# Patient Record
Sex: MALE | Race: WHITE | NOT HISPANIC OR LATINO | Employment: OTHER | URBAN - METROPOLITAN AREA
[De-identification: names, ages, dates, MRNs, and addresses within clinical notes are randomized per-mention and may not be internally consistent; named-entity substitution may affect disease eponyms.]

---

## 2018-09-11 ENCOUNTER — OFFICE VISIT (OUTPATIENT)
Dept: CARDIOLOGY | Facility: CLINIC | Age: 63
End: 2018-09-11

## 2018-09-11 VITALS
BODY MASS INDEX: 28.64 KG/M2 | HEART RATE: 63 BPM | SYSTOLIC BLOOD PRESSURE: 158 MMHG | DIASTOLIC BLOOD PRESSURE: 80 MMHG | WEIGHT: 178.2 LBS | HEIGHT: 66 IN

## 2018-09-11 DIAGNOSIS — I25.10 CORONARY ARTERY DISEASE INVOLVING NATIVE CORONARY ARTERY OF NATIVE HEART WITHOUT ANGINA PECTORIS: Primary | ICD-10-CM

## 2018-09-11 DIAGNOSIS — E11.59 TYPE 2 DIABETES MELLITUS WITH OTHER CIRCULATORY COMPLICATION, WITHOUT LONG-TERM CURRENT USE OF INSULIN (HCC): ICD-10-CM

## 2018-09-11 DIAGNOSIS — I10 ESSENTIAL HYPERTENSION: ICD-10-CM

## 2018-09-11 DIAGNOSIS — R09.89 BRUIT OF LEFT CAROTID ARTERY: ICD-10-CM

## 2018-09-11 DIAGNOSIS — E78.2 MIXED HYPERLIPIDEMIA: ICD-10-CM

## 2018-09-11 DIAGNOSIS — I34.0 NON-RHEUMATIC MITRAL REGURGITATION: ICD-10-CM

## 2018-09-11 PROCEDURE — 99204 OFFICE O/P NEW MOD 45 MIN: CPT | Performed by: INTERNAL MEDICINE

## 2018-09-11 PROCEDURE — 93000 ELECTROCARDIOGRAM COMPLETE: CPT | Performed by: INTERNAL MEDICINE

## 2018-09-11 RX ORDER — FENOFIBRATE 160 MG/1
160 TABLET ORAL DAILY
COMMUNITY
End: 2021-11-08

## 2018-09-11 RX ORDER — BISOPROLOL FUMARATE 10 MG/1
TABLET, FILM COATED ORAL
COMMUNITY
End: 2022-05-17 | Stop reason: HOSPADM

## 2018-09-11 RX ORDER — ALLOPURINOL 300 MG/1
TABLET ORAL
Status: ON HOLD | COMMUNITY
End: 2022-05-15

## 2018-09-11 RX ORDER — ASPIRIN 325 MG
325 TABLET ORAL DAILY
COMMUNITY
End: 2022-05-17 | Stop reason: HOSPADM

## 2018-09-11 RX ORDER — NITROGLYCERIN 0.4 MG/1
TABLET SUBLINGUAL
COMMUNITY
End: 2019-10-03 | Stop reason: SDUPTHER

## 2018-09-11 RX ORDER — MONTELUKAST SODIUM 10 MG/1
TABLET ORAL
COMMUNITY
End: 2019-10-03

## 2018-09-11 RX ORDER — GLIMEPIRIDE 4 MG/1
4 TABLET ORAL
COMMUNITY
End: 2022-05-17 | Stop reason: HOSPADM

## 2018-09-11 RX ORDER — AMLODIPINE BESYLATE 10 MG/1
TABLET ORAL
COMMUNITY

## 2018-09-11 RX ORDER — CITALOPRAM 40 MG/1
TABLET ORAL
COMMUNITY

## 2018-09-11 RX ORDER — CLOPIDOGREL BISULFATE 75 MG/1
TABLET ORAL
COMMUNITY
End: 2022-05-17 | Stop reason: HOSPADM

## 2018-09-11 RX ORDER — METFORMIN HYDROCHLORIDE 500 MG/1
TABLET, EXTENDED RELEASE ORAL
Status: ON HOLD | COMMUNITY
End: 2019-10-07 | Stop reason: SDUPTHER

## 2018-09-11 RX ORDER — LOSARTAN POTASSIUM 100 MG/1
TABLET ORAL
COMMUNITY
End: 2019-10-03

## 2018-09-11 RX ORDER — ISOSORBIDE MONONITRATE 30 MG/1
TABLET, EXTENDED RELEASE ORAL
COMMUNITY
End: 2019-10-03

## 2018-09-11 RX ORDER — OMEPRAZOLE 20 MG/1
20 CAPSULE, DELAYED RELEASE ORAL DAILY
COMMUNITY
End: 2019-10-03

## 2018-09-11 RX ORDER — FOLIC ACID 1 MG/1
TABLET ORAL
COMMUNITY

## 2018-09-11 RX ORDER — SIMVASTATIN 80 MG
TABLET ORAL
COMMUNITY
End: 2022-05-17 | Stop reason: HOSPADM

## 2018-09-11 NOTE — PROGRESS NOTES
Date of Office Visit: 18  Encounter Provider: Jose M Osullivan MD  Place of Service: [facility] CARDIOLOGY  Patient Name: Fernando Gauthier  :1955  Referral Provider:Jeff Keating MD      Chief Complaint   Patient presents with   • Coronary Artery Disease     History of Present Illness  Mr Gauthier is a 64 yo gentleman with a history of coronary disease severe disease of the proximal mid and the distal left anterior descending: Disease of the marginal.  He had angioplasty of multiple areas in the left anterior descending back in 2001.  Then recurrent chest discomfort in 2004.  Repeat cardiac catheterization showed all the stents in left anterior descending were patent but had 70% stenosis of the obtuse marginal and nondominant right coronary artery had angioplasty and stent placement of the marginal vessel.  Then in 2008 he had recurrent cardiac symptoms perfusion stress test was unremarkable he then had cardiac catheterization which showed severe stenosis of the proximal anterior descending widely patent mid distal and apical stents borderline disease the OM1 80 this is the mid circumflex right coronary artery was free disease had angioplasty stent placement of the circumflex and left anterior descending with drug-eluting stents.  Then in  he presented with progressive dyspnea repeat cardiac catheterization showed normal left ventricular systolic function.  Total occlusion of the right coronary artery not amenable to angioplasty other vessels are free disease was a nondominant vessel he was treated medically.  Follow-up echocardiogram in  showed normal left ventricular ejection fraction moderately dilated left atrium no significant valvular disease.  He also has history of diabetes mellitus, hypertension, and hyperlipidemia.  He now comes in to establish with cardiology.  It is been over a year since we've seen him.  He was in the hospital in   at believe with sepsis.  From a heart standpoint he says he's been doing well.  He denies chest pain or pressure.  Denies any shortness of breath, orthopnea, or PND.  Denies any palpitations, near-syncope or syncope.  Denies any abrupt loss of vision, paralysis, paresthesia, or dysarthria.  He denies any blood in his stool or black tarry stools.  He walks a mile in 15 minutes 2-3 days a week.  Overall he feels like he's doing well.  He does get some leg fatigue at that mile zain.          Past Medical History:   Diagnosis Date   • Coronary artery disease    • Diabetes mellitus (CMS/HCC)    • GERD (gastroesophageal reflux disease)    • Gout    • Hiatal hernia    • History of kidney cancer    • Hyperlipidemia    • Hypertension          Past Surgical History:   Procedure Laterality Date   • AVULSION TOENAIL PLATE     • CARDIAC CATHETERIZATION     • CHOLECYSTECTOMY     • CORONARY ANGIOPLASTY     • CORONARY STENT PLACEMENT     • EYE SURGERY     • NEPHRECTOMY Left          No current outpatient prescriptions on file prior to visit.     No current facility-administered medications on file prior to visit.          Social History     Social History   • Marital status:      Spouse name: N/A   • Number of children: 1   • Years of education: N/A     Occupational History   • retired      Social History Main Topics   • Smoking status: Light Tobacco Smoker     Packs/day: 0.25     Years: 40.00   • Smokeless tobacco: Not on file      Comment: caffeine use   • Alcohol use Yes      Comment: rare   • Drug use: Unknown   • Sexual activity: Not on file     Other Topics Concern   • Not on file     Social History Narrative   • No narrative on file       Family History   Problem Relation Age of Onset   • Heart disease Mother    • Diabetes Mother    • Stomach cancer Mother    • Heart disease Father    • Heart disease Sister    • Stroke Sister    • Diabetes Sister    • Heart disease Brother    • Stroke Brother    • Hypertension Brother  "   • Heart disease Sister    • Diabetes Sister    • Heart disease Sister    • Heart disease Sister    • Diabetes Sister    • Liver cancer Brother    • Heart attack Other          Review of Systems   Constitution: Negative for decreased appetite, diaphoresis, fever, weakness, malaise/fatigue, weight gain and weight loss.   HENT: Negative for congestion, hearing loss, nosebleeds and tinnitus.    Eyes: Negative for blurred vision, double vision, vision loss in left eye, vision loss in right eye and visual disturbance.   Cardiovascular:        As noted in HPI   Respiratory:        As noted HPI   Endocrine: Negative for cold intolerance and heat intolerance.   Hematologic/Lymphatic: Negative for bleeding problem. Does not bruise/bleed easily.   Skin: Negative for color change, flushing, itching and rash.   Musculoskeletal: Negative for arthritis, back pain, joint pain, joint swelling, muscle weakness and myalgias.   Gastrointestinal: Negative for bloating, abdominal pain, constipation, diarrhea, dysphagia, heartburn, hematemesis, hematochezia, melena, nausea and vomiting.   Genitourinary: Negative for bladder incontinence, dysuria, frequency, nocturia and urgency.   Neurological: Negative for dizziness, focal weakness, headaches, light-headedness, loss of balance, numbness, paresthesias and vertigo.   Psychiatric/Behavioral: Negative for depression, memory loss and substance abuse.       Procedures      ECG 12 Lead  Date/Time: 9/11/2018 10:25 AM  Performed by: MEETA CESPEDES  Authorized by: MEETA CESPEDES   Comparison: compared with previous ECG   Similar to previous ECG  Rhythm: sinus rhythm  Rate: normal  QRS axis: normal                  Objective:    /80 (BP Location: Left arm)   Pulse 63   Ht 167.6 cm (66\")   Wt 80.8 kg (178 lb 3.2 oz)   BMI 28.76 kg/m²        Physical Exam  Physical Exam   Constitutional: He is oriented to person, place, and time. He appears well-developed and well-nourished. No " distress.   HENT:   Head: Normocephalic.   Eyes: Pupils are equal, round, and reactive to light. Conjunctivae are normal. No scleral icterus.   Neck: Normal carotid pulses, no hepatojugular reflux and no JVD present. Carotid bruit is not present. No tracheal deviation, no edema and no erythema present. No thyromegaly present.   Cardiovascular: Normal rate, regular rhythm, S1 normal, S2 normal and intact distal pulses.   No extrasystoles are present. PMI is not displaced.  Exam reveals no gallop, no distant heart sounds and no friction rub.    Murmur heard.   Systolic murmur is present with a grade of 2/6  at the apex  Pulses:       Carotid pulses are 2+ on the right side, and 2+ on the left side with bruit.       Radial pulses are 2+ on the right side, and 2+ on the left side.        Femoral pulses are 2+ on the right side, and 2+ on the left side.       Dorsalis pedis pulses are 2+ on the right side, and 2+ on the left side.        Posterior tibial pulses are 2+ on the right side, and 2+ on the left side.   Pulmonary/Chest: Effort normal and breath sounds normal. No respiratory distress. He has no decreased breath sounds. He has no wheezes. He has no rhonchi. He has no rales. He exhibits no tenderness.   Abdominal: Soft. Bowel sounds are normal. He exhibits no distension and no mass. There is no hepatosplenomegaly. There is no tenderness. There is no rebound and no guarding.   Musculoskeletal: He exhibits no edema, tenderness or deformity.   Neurological: He is alert and oriented to person, place, and time.   Skin: Skin is warm and dry. No rash noted. He is not diaphoretic. No cyanosis or erythema. No pallor. Nails show no clubbing.   Psychiatric: He has a normal mood and affect. His speech is normal and behavior is normal. Judgment and thought content normal.           Assessment:   1.  63 gentleman history of severe coronary artery disease.  Multiple interventions including angioplasty, bare metal stent  placements, and drug eluding stent placements to the left anterior descending and circumflex.  Last cardiac catheterization in 2010 showed normal left ventricular systolic function in significant disease left anterior descending and circumflex with no evidence of restenosis but total occlusion of the nondominant right coronary artery.  Echocardiogram in 2012 normal LV systolic function.  Coronary Artery Disease  Assessment  • The patient has no angina    Plan  • Lifestyle modifications discussed include adhering to a heart healthy diet, avoidance of tobacco products, maintenance of a healthy weight, medication compliance, regular exercise and regular monitoring of cholesterol and blood pressure    Subjective - Objective  • There has been a previous stent procedure using SAUL  • There has been a previous stent procedure using BMS  • There has been a previous POBA  • Current antiplatelet therapy includes aspirin 81 mg and clopidogrel 75 mg    He's doing well.  He is to try to increase his exercise activity to 4 days a week will try to discontinue cigarette smoking.    2.  Mitral insufficiency last echocardiogram this appeared to be mild to moderate will recheck an echocardiogram he does have a murmur.  3.  Hypertension blood pressure elevated today at home he says it's running 140 - 150 systolic and diastolics in the 70-80 range.  He is to follow this at home and if not at goal 140 systolic will touch with his PCP.  4.  Hyperlipidemia on target dose simvastatin last LDL was 52.  5.  Diabetes mellitus followed by PCP. If needed would consider starting medication to reduce Cardiovascular events such as SGLT2 inhibitors (Forxiga, Invokana, Jardiance) or GLP-1 receptor agonists(Trulicity, Victoza).  6.  Carotid bruit with decreased pulse in the left arm we'll check a carotid ultrasound.         Plan:

## 2018-09-20 ENCOUNTER — TELEPHONE (OUTPATIENT)
Dept: CARDIOLOGY | Facility: CLINIC | Age: 63
End: 2018-09-20

## 2018-09-20 ENCOUNTER — HOSPITAL ENCOUNTER (OUTPATIENT)
Dept: CARDIOLOGY | Facility: HOSPITAL | Age: 63
Discharge: HOME | End: 2018-09-20
Attending: INTERNAL MEDICINE

## 2018-09-20 ENCOUNTER — HOSPITAL ENCOUNTER (OUTPATIENT)
Dept: CARDIOLOGY | Facility: HOSPITAL | Age: 63
Discharge: HOME | End: 2018-09-20
Attending: INTERNAL MEDICINE | Admitting: INTERNAL MEDICINE

## 2018-09-20 VITALS
WEIGHT: 178 LBS | HEIGHT: 66 IN | HEART RATE: 67 BPM | BODY MASS INDEX: 28.61 KG/M2 | SYSTOLIC BLOOD PRESSURE: 128 MMHG | DIASTOLIC BLOOD PRESSURE: 48 MMHG

## 2018-09-20 LAB
BH CV ECHO MEAS - ACS: 2.1 CM
BH CV ECHO MEAS - AO MAX PG (FULL): 3.8 MMHG
BH CV ECHO MEAS - AO MAX PG: 11.6 MMHG
BH CV ECHO MEAS - AO MEAN PG (FULL): 2.7 MMHG
BH CV ECHO MEAS - AO MEAN PG: 6.7 MMHG
BH CV ECHO MEAS - AO ROOT AREA (BSA CORRECTED): 1.6
BH CV ECHO MEAS - AO ROOT AREA: 6.9 CM^2
BH CV ECHO MEAS - AO ROOT DIAM: 3 CM
BH CV ECHO MEAS - AO V2 MAX: 170.4 CM/SEC
BH CV ECHO MEAS - AO V2 MEAN: 122.4 CM/SEC
BH CV ECHO MEAS - AO V2 VTI: 40.1 CM
BH CV ECHO MEAS - AVA(I,A): 1.6 CM^2
BH CV ECHO MEAS - AVA(I,D): 1.6 CM^2
BH CV ECHO MEAS - AVA(V,A): 1.7 CM^2
BH CV ECHO MEAS - AVA(V,D): 1.7 CM^2
BH CV ECHO MEAS - BSA(HAYCOCK): 2 M^2
BH CV ECHO MEAS - BSA: 1.9 M^2
BH CV ECHO MEAS - BZI_BMI: 28.6 KILOGRAMS/M^2
BH CV ECHO MEAS - BZI_METRIC_HEIGHT: 167.6 CM
BH CV ECHO MEAS - BZI_METRIC_WEIGHT: 80.3 KG
BH CV ECHO MEAS - EDV(MOD-SP2): 119 ML
BH CV ECHO MEAS - EDV(MOD-SP4): 114 ML
BH CV ECHO MEAS - EDV(TEICH): 137.7 ML
BH CV ECHO MEAS - EF(CUBED): 83 %
BH CV ECHO MEAS - EF(MOD-BP): 59 %
BH CV ECHO MEAS - EF(MOD-SP2): 62.2 %
BH CV ECHO MEAS - EF(MOD-SP4): 56.1 %
BH CV ECHO MEAS - EF(TEICH): 75.4 %
BH CV ECHO MEAS - ESV(MOD-SP2): 45 ML
BH CV ECHO MEAS - ESV(MOD-SP4): 50 ML
BH CV ECHO MEAS - ESV(TEICH): 33.8 ML
BH CV ECHO MEAS - IVS/LVPW: 0.96
BH CV ECHO MEAS - IVSD: 1 CM
BH CV ECHO MEAS - LAT PEAK E' VEL: 7 CM/SEC
BH CV ECHO MEAS - LV DIASTOLIC VOL/BSA (35-75): 60 ML/M^2
BH CV ECHO MEAS - LV MASS(C)D: 218.2 GRAMS
BH CV ECHO MEAS - LV MASS(C)DI: 114.9 GRAMS/M^2
BH CV ECHO MEAS - LV MAX PG: 7.9 MMHG
BH CV ECHO MEAS - LV MEAN PG: 4 MMHG
BH CV ECHO MEAS - LV SYSTOLIC VOL/BSA (12-30): 26.3 ML/M^2
BH CV ECHO MEAS - LV V1 MAX: 140.2 CM/SEC
BH CV ECHO MEAS - LV V1 MEAN: 94.6 CM/SEC
BH CV ECHO MEAS - LV V1 VTI: 30.3 CM
BH CV ECHO MEAS - LVIDD: 5.3 CM
BH CV ECHO MEAS - LVIDS: 3 CM
BH CV ECHO MEAS - LVLD AP2: 8.8 CM
BH CV ECHO MEAS - LVLD AP4: 8.2 CM
BH CV ECHO MEAS - LVLS AP2: 7.5 CM
BH CV ECHO MEAS - LVLS AP4: 6.9 CM
BH CV ECHO MEAS - LVOT AREA (M): 2 CM^2
BH CV ECHO MEAS - LVOT AREA: 2.1 CM^2
BH CV ECHO MEAS - LVOT DIAM: 1.6 CM
BH CV ECHO MEAS - LVPWD: 1.1 CM
BH CV ECHO MEAS - MED PEAK E' VEL: 6 CM/SEC
BH CV ECHO MEAS - MR MAX PG: 27.2 MMHG
BH CV ECHO MEAS - MR MAX VEL: 261 CM/SEC
BH CV ECHO MEAS - MV A DUR: 0.15 SEC
BH CV ECHO MEAS - MV A MAX VEL: 118.7 CM/SEC
BH CV ECHO MEAS - MV DEC SLOPE: 249.5 CM/SEC^2
BH CV ECHO MEAS - MV DEC TIME: 0.34 SEC
BH CV ECHO MEAS - MV E MAX VEL: 82 CM/SEC
BH CV ECHO MEAS - MV E/A: 0.69
BH CV ECHO MEAS - MV MAX PG: 12 MMHG
BH CV ECHO MEAS - MV MEAN PG: 3.7 MMHG
BH CV ECHO MEAS - MV P1/2T MAX VEL: 83.7 CM/SEC
BH CV ECHO MEAS - MV P1/2T: 98.2 MSEC
BH CV ECHO MEAS - MV V2 MAX: 173.4 CM/SEC
BH CV ECHO MEAS - MV V2 MEAN: 88.8 CM/SEC
BH CV ECHO MEAS - MV V2 VTI: 56 CM
BH CV ECHO MEAS - MVA P1/2T LCG: 2.6 CM^2
BH CV ECHO MEAS - MVA(P1/2T): 2.2 CM^2
BH CV ECHO MEAS - MVA(VTI): 1.1 CM^2
BH CV ECHO MEAS - PA MAX PG (FULL): 3 MMHG
BH CV ECHO MEAS - PA MAX PG: 5.5 MMHG
BH CV ECHO MEAS - PA V2 MAX: 116.7 CM/SEC
BH CV ECHO MEAS - PULM A REVS DUR: 0.13 SEC
BH CV ECHO MEAS - PULM A REVS VEL: 30.2 CM/SEC
BH CV ECHO MEAS - PULM DIAS VEL: 57.3 CM/SEC
BH CV ECHO MEAS - PULM S/D: 1.1
BH CV ECHO MEAS - PULM SYS VEL: 61.8 CM/SEC
BH CV ECHO MEAS - PVA(V,A): 1.8 CM^2
BH CV ECHO MEAS - PVA(V,D): 1.8 CM^2
BH CV ECHO MEAS - QP/QS: 0.67
BH CV ECHO MEAS - RV MAX PG: 2.4 MMHG
BH CV ECHO MEAS - RV MEAN PG: 1.2 MMHG
BH CV ECHO MEAS - RV V1 MAX: 78.1 CM/SEC
BH CV ECHO MEAS - RV V1 MEAN: 50 CM/SEC
BH CV ECHO MEAS - RV V1 VTI: 15.8 CM
BH CV ECHO MEAS - RVOT AREA: 2.7 CM^2
BH CV ECHO MEAS - RVOT DIAM: 1.8 CM
BH CV ECHO MEAS - SI(AO): 145.1 ML/M^2
BH CV ECHO MEAS - SI(CUBED): 66.5 ML/M^2
BH CV ECHO MEAS - SI(LVOT): 33.1 ML/M^2
BH CV ECHO MEAS - SI(MOD-SP2): 39 ML/M^2
BH CV ECHO MEAS - SI(MOD-SP4): 33.7 ML/M^2
BH CV ECHO MEAS - SI(TEICH): 54.7 ML/M^2
BH CV ECHO MEAS - SUP REN AO DIAM: 2.2 CM
BH CV ECHO MEAS - SV(AO): 275.4 ML
BH CV ECHO MEAS - SV(CUBED): 126.3 ML
BH CV ECHO MEAS - SV(LVOT): 62.9 ML
BH CV ECHO MEAS - SV(MOD-SP2): 74 ML
BH CV ECHO MEAS - SV(MOD-SP4): 64 ML
BH CV ECHO MEAS - SV(RVOT): 42.1 ML
BH CV ECHO MEAS - SV(TEICH): 103.9 ML
BH CV ECHO MEAS - TAPSE (>1.6): 2.3 CM2
BH CV ECHO MEASUREMENTS AVERAGE E/E' RATIO: 12.62
BH CV XLRA - RV BASE: 2.4 CM
BH CV XLRA - TDI S': 13 CM/SEC
BH CV XLRA MEAS LEFT DIST ICA EDV: -20.6 CM/SEC
BH CV XLRA MEAS LEFT DIST ICA PSV: -64.5 CM/SEC
BH CV XLRA MEAS LEFT ICA/CCA RATIO: 0.7
BH CV XLRA MEAS LEFT MID ICA EDV: -23.3 CM/SEC
BH CV XLRA MEAS LEFT MID ICA PSV: -74.3 CM/SEC
BH CV XLRA MEAS LEFT PROX CCA EDV: -20.6 CM/SEC
BH CV XLRA MEAS LEFT PROX CCA PSV: -100.2 CM/SEC
BH CV XLRA MEAS LEFT PROX ECA PSV: -91 CM/SEC
BH CV XLRA MEAS LEFT PROX ICA EDV: -24.2 CM/SEC
BH CV XLRA MEAS LEFT PROX ICA PSV: -69.8 CM/SEC
BH CV XLRA MEAS LEFT PROX SCLA PSV: 125.6 CM/SEC
BH CV XLRA MEAS RIGHT DIST ICA EDV: -17.7 CM/SEC
BH CV XLRA MEAS RIGHT DIST ICA PSV: -56.8 CM/SEC
BH CV XLRA MEAS RIGHT ICA/CCA RATIO: 1.1
BH CV XLRA MEAS RIGHT MID ICA EDV: -20.6 CM/SEC
BH CV XLRA MEAS RIGHT MID ICA PSV: -73.7 CM/SEC
BH CV XLRA MEAS RIGHT PROX CCA EDV: 11.8 CM/SEC
BH CV XLRA MEAS RIGHT PROX CCA PSV: 64.6 CM/SEC
BH CV XLRA MEAS RIGHT PROX ECA PSV: -94.3 CM/SEC
BH CV XLRA MEAS RIGHT PROX ICA EDV: -12.5 CM/SEC
BH CV XLRA MEAS RIGHT PROX ICA PSV: -53.8 CM/SEC
BH CV XLRA MEAS RIGHT PROX SCLA PSV: 155.2 CM/SEC
LEFT ATRIUM VOLUME INDEX: 22 ML/M2

## 2018-09-20 PROCEDURE — 93306 TTE W/DOPPLER COMPLETE: CPT

## 2018-09-20 PROCEDURE — 93880 EXTRACRANIAL BILAT STUDY: CPT

## 2018-09-20 PROCEDURE — 93306 TTE W/DOPPLER COMPLETE: CPT | Performed by: INTERNAL MEDICINE

## 2018-09-20 PROCEDURE — 93880 EXTRACRANIAL BILAT STUDY: CPT | Performed by: INTERNAL MEDICINE

## 2018-09-20 NOTE — TELEPHONE ENCOUNTER
Fernando Gauthier  Male, 63 y.o., 1955  PCP:   Jeff Keating MD  Language:   English  Need Interp:   No  Last Weight:   80.7 kg (178 lb)  Phone:   H: [phone]  Allergies  No Known Allergies  Health Maintenance:   Due  FYI:   None  Primary Ins.:   [employer]  MRN:   [id number]  MyChart:   Code Exp  Pharmacy:   [facility] [address] [place], [place] [id number] Formerly [facility], [facility] [address] [phone] [place] [phone] FX [[id number]]  Preferred Lab:   None  Next Appt with Me:   None  Next Appt Date by Dept:   None        PACS Images     Radiology Images   Duplex Carotid Ultrasound CAR   Order: [id number]   Status:  Final result   Visible to patient:  No (Not Released) Dx:  Bruit of left carotid artery   Details     Reading Physician Reading Date Result Priority   Jose M Osullivan MD 9/20/2018 Routine   Result Text   ·   Right CCA Prox: Imaging indicates atherosclerotic plaque. • Right CCA Dist: Imaging indicates atherosclerotic plaque.  · Right ICA Prox: Imaging indicates atherosclerotic plaque.  · Left CCA Prox: Imaging indicates atherosclerotic plaque. • Left CCA Dist: Imaging indicates atherosclerotic plaque.  · Left ICA Prox: Imaging indicates atherosclerotic plaque.  · No stenosis.            All Measurements                                                                                                                                    [facility] OUTPATIENT ECHOCARDIOGRAPHY  [address]  [address]  [facility] [address] [phone]      Study Findings     • Right Vertebral: Antegrade flow noted.       • Left Vertebral: Antegrade flow noted.   Study Impression     • Right CCA Prox: Imaging indicates atherosclerotic plaque.   • Right CCA Dist: Imaging indicates atherosclerotic plaque.   • Right ICA Prox: Imaging indicates atherosclerotic plaque. Imaging of the right ICA indicates atherosclerotic plaque.  • Right Vertebral: Antegrade flow is present.     All other right side carotid system  vessels are normal.   Imaging of the right ICA indicates atherosclerotic plaque.    • Left CCA Prox: Imaging indicates atherosclerotic plaque.   • Left CCA Dist: Imaging indicates atherosclerotic plaque.   • Left ICA Prox: Imaging indicates atherosclerotic plaque. Imaging of the left ICA indicates atherosclerotic plaque.  • Left Vertebral: Antegrade flow present.     All other left side carotid system vessels are normal.   Imaging of the left ICA indicates atherosclerotic plaque.   PACS Images     Show images for Duplex Carotid Ultrasound CAR         Specimen Collected: 09/20/18 13:53 Last Resulted: 09/20/18 15:30                Status of Other Orders     Order  Lab Status Result Date Provider Status   Adult Transthoracic Echo Complete W/ Cont if Necessary Per Protocol In process 9/20/2018 Open   ECG 12 Lead Final result 9/11/2018 Open          Encounter Notes      All notes         Routing History     Priority Sent On From To Message Type    9/20/2018  3:32 PM Jose M Osullivan MD Imburgia, Michael, MD Results

## 2018-09-20 NOTE — TELEPHONE ENCOUNTER
Called left a message carotid ultrasound no real blockage, echocardiogram unremarkable.  To call back if problems and to keep his regular palmar with us.

## 2018-09-21 NOTE — TELEPHONE ENCOUNTER
Pt called back and lmom he was returning a call.      9:45am 9/21/18    I called back pt and lmom with results again.  [facility]

## 2019-10-02 ENCOUNTER — TELEPHONE (OUTPATIENT)
Dept: CARDIOLOGY | Facility: CLINIC | Age: 64
End: 2019-10-02

## 2019-10-02 NOTE — TELEPHONE ENCOUNTER
10/02/19  11:36 AM  Fernando Gauthier  1955  Home Phone [phone]   Home Phone [phone]       Fernando Gauthier is a patient of Dr osullivan.  He is calling in requesting an apt for intermittent chest pain. He explains that he had an episode 2 weeks ago of chest tightness that caused his to become nauseated and diaphoretic.  He also experienced SOA. He said that he kneeled down in the shade and he was unable to get up due to weakness for about 5-10 min before the episode resolved.  He said he experienced it again 2 days ago and stated he has several stents and the chest tightness felt the same as when he got his stents in the past.    Discussed with Dr Osullivan and scheduled him to see Melony tomorrow.  Patient was instructed to go to the ER if his symptoms return, and he verbalized understanding.  Thanks  Rocío Sanchez RN  Triage nurse

## 2019-10-03 ENCOUNTER — LAB (OUTPATIENT)
Dept: LAB | Facility: HOSPITAL | Age: 64
End: 2019-10-03

## 2019-10-03 ENCOUNTER — OFFICE VISIT (OUTPATIENT)
Dept: CARDIOLOGY | Facility: CLINIC | Age: 64
End: 2019-10-03

## 2019-10-03 ENCOUNTER — TRANSCRIBE ORDERS (OUTPATIENT)
Dept: ADMINISTRATIVE | Facility: HOSPITAL | Age: 64
End: 2019-10-03

## 2019-10-03 VITALS
SYSTOLIC BLOOD PRESSURE: 150 MMHG | HEART RATE: 58 BPM | HEIGHT: 66 IN | DIASTOLIC BLOOD PRESSURE: 90 MMHG | RESPIRATION RATE: 16 BRPM | WEIGHT: 180.2 LBS | BODY MASS INDEX: 28.96 KG/M2

## 2019-10-03 DIAGNOSIS — E11.59 TYPE 2 DIABETES MELLITUS WITH OTHER CIRCULATORY COMPLICATION, WITHOUT LONG-TERM CURRENT USE OF INSULIN (HCC): ICD-10-CM

## 2019-10-03 DIAGNOSIS — I25.110 CORONARY ARTERY DISEASE INVOLVING NATIVE CORONARY ARTERY OF NATIVE HEART WITH UNSTABLE ANGINA PECTORIS (HCC): Primary | ICD-10-CM

## 2019-10-03 DIAGNOSIS — I20.0 INTERMEDIATE CORONARY SYNDROME (HCC): ICD-10-CM

## 2019-10-03 DIAGNOSIS — I65.23 CAROTID ARTERY PLAQUE, BILATERAL: ICD-10-CM

## 2019-10-03 DIAGNOSIS — Z13.6 SCREENING FOR ISCHEMIC HEART DISEASE: ICD-10-CM

## 2019-10-03 DIAGNOSIS — E78.2 MIXED HYPERLIPIDEMIA: ICD-10-CM

## 2019-10-03 DIAGNOSIS — Z01.810 PRE-OPERATIVE CARDIOVASCULAR EXAMINATION: Primary | ICD-10-CM

## 2019-10-03 DIAGNOSIS — Z01.810 PRE-OPERATIVE CARDIOVASCULAR EXAMINATION: ICD-10-CM

## 2019-10-03 DIAGNOSIS — I20.0 UNSTABLE ANGINA (HCC): ICD-10-CM

## 2019-10-03 DIAGNOSIS — I34.0 NON-RHEUMATIC MITRAL REGURGITATION: ICD-10-CM

## 2019-10-03 DIAGNOSIS — I10 ESSENTIAL HYPERTENSION: ICD-10-CM

## 2019-10-03 LAB
ANION GAP SERPL CALCULATED.3IONS-SCNC: 13.7 MMOL/L (ref 5–15)
BASOPHILS # BLD AUTO: 0.1 10*3/MM3 (ref 0–0.2)
BASOPHILS NFR BLD AUTO: 1 % (ref 0–1.5)
BUN BLD-MCNC: 27 MG/DL (ref 8–23)
BUN/CREAT SERPL: 14.8 (ref 7–25)
CALCIUM SPEC-SCNC: 10 MG/DL (ref 8.6–10.5)
CHLORIDE SERPL-SCNC: 96 MMOL/L (ref 98–107)
CO2 SERPL-SCNC: 27.3 MMOL/L (ref 22–29)
CREAT BLD-MCNC: 1.83 MG/DL (ref 0.76–1.27)
DEPRECATED RDW RBC AUTO: 43 FL (ref 37–54)
EOSINOPHIL # BLD AUTO: 0.74 10*3/MM3 (ref 0–0.4)
EOSINOPHIL NFR BLD AUTO: 7.1 % (ref 0.3–6.2)
ERYTHROCYTE [DISTWIDTH] IN BLOOD BY AUTOMATED COUNT: 13.2 % (ref 12.3–15.4)
GFR SERPL CREATININE-BSD FRML MDRD: 37 ML/MIN/1.73
GLUCOSE BLD-MCNC: 177 MG/DL (ref 65–99)
HCT VFR BLD AUTO: 44.1 % (ref 37.5–51)
HGB BLD-MCNC: 15 G/DL (ref 13–17.7)
IMM GRANULOCYTES # BLD AUTO: 0.09 10*3/MM3 (ref 0–0.05)
IMM GRANULOCYTES NFR BLD AUTO: 0.9 % (ref 0–0.5)
LYMPHOCYTES # BLD AUTO: 3.1 10*3/MM3 (ref 0.7–3.1)
LYMPHOCYTES NFR BLD AUTO: 29.8 % (ref 19.6–45.3)
MCH RBC QN AUTO: 30.5 PG (ref 26.6–33)
MCHC RBC AUTO-ENTMCNC: 34 G/DL (ref 31.5–35.7)
MCV RBC AUTO: 89.6 FL (ref 79–97)
MONOCYTES # BLD AUTO: 0.52 10*3/MM3 (ref 0.1–0.9)
MONOCYTES NFR BLD AUTO: 5 % (ref 5–12)
NEUTROPHILS # BLD AUTO: 5.85 10*3/MM3 (ref 1.7–7)
NEUTROPHILS NFR BLD AUTO: 56.2 % (ref 42.7–76)
NRBC BLD AUTO-RTO: 0 /100 WBC (ref 0–0.2)
PLATELET # BLD AUTO: 336 10*3/MM3 (ref 140–450)
PMV BLD AUTO: 10.1 FL (ref 6–12)
POTASSIUM BLD-SCNC: 4.1 MMOL/L (ref 3.5–5.2)
RBC # BLD AUTO: 4.92 10*6/MM3 (ref 4.14–5.8)
SODIUM BLD-SCNC: 137 MMOL/L (ref 136–145)
WBC NRBC COR # BLD: 10.4 10*3/MM3 (ref 3.4–10.8)

## 2019-10-03 PROCEDURE — 80048 BASIC METABOLIC PNL TOTAL CA: CPT

## 2019-10-03 PROCEDURE — 93000 ELECTROCARDIOGRAM COMPLETE: CPT | Performed by: NURSE PRACTITIONER

## 2019-10-03 PROCEDURE — 85025 COMPLETE CBC W/AUTO DIFF WBC: CPT

## 2019-10-03 PROCEDURE — 99214 OFFICE O/P EST MOD 30 MIN: CPT | Performed by: NURSE PRACTITIONER

## 2019-10-03 PROCEDURE — 36415 COLL VENOUS BLD VENIPUNCTURE: CPT

## 2019-10-03 RX ORDER — LOSARTAN POTASSIUM 100 MG/1
100 TABLET ORAL DAILY
Qty: 30 TABLET | Refills: 11 | Status: SHIPPED | OUTPATIENT
Start: 2019-10-03 | End: 2020-06-01

## 2019-10-03 RX ORDER — NITROGLYCERIN 0.4 MG/1
0.4 TABLET SUBLINGUAL
Qty: 30 TABLET | Refills: 1 | Status: SHIPPED | OUTPATIENT
Start: 2019-10-03 | End: 2022-05-25 | Stop reason: SDUPTHER

## 2019-10-03 NOTE — H&P (VIEW-ONLY)
Date of Office Visit: 10/03/2019  Encounter Provider: ALAN Apodaca  Place of Service: [facility] CARDIOLOGY  Patient Name: Fernando Gauthier  :1955    Chief Complaint   Patient presents with   • Coronary Artery Disease   • Follow-up   :     HPI: Fernando Gauthier is a 64 y.o. male  with history of hypertension, hyperlipidemia, diabetes mellitus, mitral regurgitation, coronary artery disease status post multiple coronary stent placement, and bilateral carotid artery plaque.  He is a patient of Dr. Osullivan.  I am seeing him for the first time today and have reviewed his medical record.    He is had severe disease of the proximal, mid and the distal LAD as well as disease of the marginal branch.  He had angioplasty to multiple areas of the LAD back in 2001 and then recurrent chest discomfort 2004.  Repeat cardiac catheterization showed all stents in the LAD were patent but 70% stenosis of the obtuse marginal.  He had angioplasty and stent placed to the marginal.  Then 2008 had recurrent cardiac symptoms perfusion stress test was unremarkable proceeded with cardiac catheterization which showed severe stenosis of the proximal left anterior descending, widely patent mid distal and apical stents with borderline disease of the OM1 with 80% in the mid circumflex, right coronary artery was free of disease and he had angioplasty stent placement of the circumflex as well as to the proximal LAD.  Then in  he presented with progressive dyspnea and had a repeat cardiac catheterization showed normal left ventricular systolic function with total occlusion of the right coronary artery not amenable to angioplasty of the vessels were free of obstructive disease and since the right coronary artery was nondominant he was treated medically.  Follow-up Echocardiogram in  showed normal left ventricular systolic function, moderately dilated left atrium no significant valvular  disease.  He had a perfusion stress test at that time which showed an ejection fraction 43% with no ischemia.    He was established in this office in September 2018.  He had a carotid duplex September 2018 which showed bilateral plaque no stenosis.  He also had an echocardiogram which showed normal left ventricular systolic function with an EF of 59%, grade 1 diastolic dysfunction with mild mitral regurgitation.    He presents today for evaluation of intermittent chest tightness x 2 weeks.  He is accompanied by his daughter.  He had some associated nausea and diaphoreses with shortness of breath.  Last week he was sitting at a football game when he had tightness in the chest with some mild diaphoreses.  Later on that week he had another episode which brought him to his knees with diaphoresis and nausea.  This past Monday he was planting a friend and was unable to complete his task as every time he went to go lift he had pain so he stopped.  He also has some lightheadedness associated with the sweating episodes.  He denies palpitation edema, near-syncope, or syncope but he has had some increased fatigue.  Unfortunately continues to smoke 0.25 pack per day.      No Known Allergies    Past Medical History:   Diagnosis Date   • Coronary artery disease    • Diabetes mellitus (CMS/HCC)    • GERD (gastroesophageal reflux disease)    • Gout    • Hiatal hernia    • History of kidney cancer    • Hyperlipidemia    • Hypertension        Past Surgical History:   Procedure Laterality Date   • AVULSION TOENAIL PLATE     • CARDIAC CATHETERIZATION     • CHOLECYSTECTOMY     • CORONARY ANGIOPLASTY     • CORONARY STENT PLACEMENT     • EYE SURGERY     • NEPHRECTOMY Left          Family and social history reviewed.     ROS  All other systems were reviewed and are negative          Objective:     Vitals:    10/03/19 0928   BP: 150/90   BP Location: Right arm   Patient Position: Sitting   Pulse: 58   Resp: 16   Weight: 81.7 kg (180 lb 3.2  "oz)   Height: 167.6 cm (66\")     Body mass index is 29.09 kg/m².    PHYSICAL EXAM:  Physical Exam   Constitutional: He is oriented to person, place, and time. He appears well-developed and well-nourished. No distress.   HENT:   Head: Normocephalic.   Eyes: Conjunctivae are normal.   Neck: Normal range of motion. No JVD present.   Cardiovascular: Normal rate, regular rhythm, normal heart sounds and intact distal pulses.   No murmur heard.  Pulses:       Carotid pulses are 2+ on the right side, and 2+ on the left side.       Radial pulses are 2+ on the right side, and 2+ on the left side.        Posterior tibial pulses are 2+ on the right side, and 2+ on the left side.   Pulmonary/Chest: Effort normal and breath sounds normal. No respiratory distress. He has no wheezes. He has no rhonchi. He has no rales. He exhibits no tenderness.   Abdominal: Soft. Bowel sounds are normal. He exhibits no distension.   Musculoskeletal: Normal range of motion. He exhibits no edema.   Neurological: He is alert and oriented to person, place, and time.   Skin: Skin is warm, dry and intact. No rash noted. He is not diaphoretic. No cyanosis.   Psychiatric: He has a normal mood and affect. His behavior is normal. Judgment and thought content normal.         ECG 12 Lead  Date/Time: 10/3/2019 9:39 AM  Performed by: Melony Singh APRN  Authorized by: Melony Singh APRN   Comparison: compared with previous ECG from 9/11/2018  Rhythm: sinus rhythm  Rate: normal  Conduction: 1st degree AV block  ST Elevation: V2 and V3  QRS axis: normal    Clinical impression: abnormal EKG  Comments: No significant change            Current Outpatient Medications   Medication Sig Dispense Refill   • allopurinol (ZYLOPRIM) 300 MG tablet allopurinol 300 mg tablet daily     • amLODIPine (NORVASC) 10 MG tablet amlodipine 10 mg tablet daily     • aspirin 325 MG tablet Take 325 mg by mouth Daily.     • bisoprolol (ZEBeta) 10 MG tablet bisoprolol fumarate 10 mg tablet " daily     • Cholecalciferol (VITAMIN D PO) Take 25 mcg by mouth.     • citalopram (CeleXA) 40 MG tablet citalopram 40 mg tablet   Take 1 tablet every day by oral route.     • clopidogrel (PLAVIX) 75 MG tablet clopidogrel 75 mg tablet   Take 1 tablet every day by oral route.     • esomeprazole (nexIUM) 20 MG capsule Take 20 mg by mouth Every Morning Before Breakfast.     • fenofibrate 160 MG tablet Take 160 mg by mouth Daily.     • folic acid (FOLVITE) 1 MG tablet folic acid 1 mg tablet   TAKE ONE TABLET BY MOUTH ONCE DAILY     • glimepiride (AMARYL) 4 MG tablet Take 2 mg by mouth Every Morning Before Breakfast.     • Insulin NPH Human, Isophane, (NOVOLIN N SC) Inject  under the skin into the appropriate area as directed. Units am, 20 units pm     • metFORMIN ER (GLUCOPHAGE-XR) 500 MG 24 hr tablet metformin  mg tablet,extended release 24 hr   TAKE FOUR TABLETS BY MOUTH ONCE DAILY     • nitroglycerin (NITROSTAT) 0.4 MG SL tablet nitroglycerin 0.4 mg sublingual tablet   Place 1 tablet every day by sublingual route prn     • Omega-3 Fatty Acids (OMEGA 3 500 PO) Take  by mouth. 8 Tablets daily     • simvastatin (ZOCOR) 80 MG tablet simvastatin 80 mg tablet   TAKE ONE TABLET BY MOUTH ONCE DAILY       No current facility-administered medications for this visit.      Assessment:       Diagnosis Plan   1. Coronary artery disease involving native coronary artery of native heart with unstable angina pectoris (CMS/HCC)  ECG 12 Lead   2. Essential hypertension     3. Mixed hyperlipidemia     4. Non-rheumatic mitral regurgitation     5. Type 2 diabetes mellitus with other circulatory complication, without long-term current use of insulin (CMS/HCC)     6. Carotid artery plaque, bilateral     7. Unstable angina (CMS/HCC)  Case Request Cath Lab: Left Heart Cath        Orders Placed This Encounter   Procedures   • ECG 12 Lead     This order was created via procedure documentation         Plan:       1.  [id number]  history of severe coronary artery disease.  Multiple interventions including angioplasty, bare metal stent placements, and drug eluding stent placements to the left anterior descending and circumflex.  Last cardiac catheterization in 2010 showed normal left ventricular systolic function in significant disease left anterior descending and circumflex with no evidence of restenosis but total occlusion of the nondominant right coronary artery.  Echocardiogram in 2012 normal LV systolic function.  Coronary Artery Disease  Assessment  • The patient has no angina    Plan  • Lifestyle modifications discussed include adhering to a heart healthy diet, avoidance of tobacco products, maintenance of a healthy weight, medication compliance, regular exercise and regular monitoring of cholesterol and blood pressure    Subjective - Objective  • There has been a previous stent procedure using SAUL  • There has been a previous stent procedure using BMS  • There has been a previous POBA  • Current antiplatelet therapy includes aspirin 81 mg and clopidogrel 75 mg        Now with some worries some chest tightness with associated nausea and diaphoresis which began 2 weeks ago and is been intermittent.  His last episode was on Monday while planting [place] so he stopped.  We will arrange for cardiac catheterization within the next week.  If he has worsening symptoms he is to present to the emergency department and verbalized understanding.  He does not have any nitroglycerin sublingual tablets I will send some to his local pharmacy  2.  Mitral insufficiency last echocardiogram September 2018 mild  3.  Hypertension blood pressure elevated today for some reason he is not on his losartan we will get him back on that prior dose.  We discussed goal 130/80 or less   4.  Mitral regurgitation-mild on echo September 2018  5.  Bilateral carotid plaque no stenosis on duplex September 2018  6.  Hyperlipidemia on target dose simvastatin  lastLDL was at  52 target 70 or less  7.  Diabetes mellitus followed by PCP If needed would consider starting medication to reduce Cardiovascular events such as SGLT2 inhibitors (Jardiance or Invokana ) or GLP-1 receptor agonists such as Victoza.    We will arrange appropriate follow-up pending cardiac catheterization results.      It has been a pleasure to participate in this patient's care.      Thank you,  ALAN Apodaca      **I used Dragon to dictate this note:**

## 2019-10-03 NOTE — PROGRESS NOTES
Date of Office Visit: 10/03/2019  Encounter Provider: ALAN Apodaca  Place of Service: [facility] CARDIOLOGY  Patient Name: Fernando Gauthier  :1955    Chief Complaint   Patient presents with   • Coronary Artery Disease   • Follow-up   :     HPI: Fernando Gauthier is a 64 y.o. male  with history of hypertension, hyperlipidemia, diabetes mellitus, mitral regurgitation, coronary artery disease status post multiple coronary stent placement, and bilateral carotid artery plaque.  He is a patient of Dr. Osullivan.  I am seeing him for the first time today and have reviewed his medical record.    He is had severe disease of the proximal, mid and the distal LAD as well as disease of the marginal branch.  He had angioplasty to multiple areas of the LAD back in 2001 and then recurrent chest discomfort 2004.  Repeat cardiac catheterization showed all stents in the LAD were patent but 70% stenosis of the obtuse marginal.  He had angioplasty and stent placed to the marginal.  Then 2008 had recurrent cardiac symptoms perfusion stress test was unremarkable proceeded with cardiac catheterization which showed severe stenosis of the proximal left anterior descending, widely patent mid distal and apical stents with borderline disease of the OM1 with 80% in the mid circumflex, right coronary artery was free of disease and he had angioplasty stent placement of the circumflex as well as to the proximal LAD.  Then in  he presented with progressive dyspnea and had a repeat cardiac catheterization showed normal left ventricular systolic function with total occlusion of the right coronary artery not amenable to angioplasty of the vessels were free of obstructive disease and since the right coronary artery was nondominant he was treated medically.  Follow-up Echocardiogram in  showed normal left ventricular systolic function, moderately dilated left atrium no significant valvular  disease.  He had a perfusion stress test at that time which showed an ejection fraction 43% with no ischemia.    He was established in this office in September 2018.  He had a carotid duplex September 2018 which showed bilateral plaque no stenosis.  He also had an echocardiogram which showed normal left ventricular systolic function with an EF of 59%, grade 1 diastolic dysfunction with mild mitral regurgitation.    He presents today for evaluation of intermittent chest tightness x 2 weeks.  He is accompanied by his daughter.  He had some associated nausea and diaphoreses with shortness of breath.  Last week he was sitting at a football game when he had tightness in the chest with some mild diaphoreses.  Later on that week he had another episode which brought him to his knees with diaphoresis and nausea.  This past Monday he was planting a friend and was unable to complete his task as every time he went to go lift he had pain so he stopped.  He also has some lightheadedness associated with the sweating episodes.  He denies palpitation edema, near-syncope, or syncope but he has had some increased fatigue.  Unfortunately continues to smoke 0.25 pack per day.      No Known Allergies    Past Medical History:   Diagnosis Date   • Coronary artery disease    • Diabetes mellitus (CMS/HCC)    • GERD (gastroesophageal reflux disease)    • Gout    • Hiatal hernia    • History of kidney cancer    • Hyperlipidemia    • Hypertension        Past Surgical History:   Procedure Laterality Date   • AVULSION TOENAIL PLATE     • CARDIAC CATHETERIZATION     • CHOLECYSTECTOMY     • CORONARY ANGIOPLASTY     • CORONARY STENT PLACEMENT     • EYE SURGERY     • NEPHRECTOMY Left          Family and social history reviewed.     ROS  All other systems were reviewed and are negative          Objective:     Vitals:    10/03/19 0928   BP: 150/90   BP Location: Right arm   Patient Position: Sitting   Pulse: 58   Resp: 16   Weight: 81.7 kg (180 lb 3.2  "oz)   Height: 167.6 cm (66\")     Body mass index is 29.09 kg/m².    PHYSICAL EXAM:  Physical Exam   Constitutional: He is oriented to person, place, and time. He appears well-developed and well-nourished. No distress.   HENT:   Head: Normocephalic.   Eyes: Conjunctivae are normal.   Neck: Normal range of motion. No JVD present.   Cardiovascular: Normal rate, regular rhythm, normal heart sounds and intact distal pulses.   No murmur heard.  Pulses:       Carotid pulses are 2+ on the right side, and 2+ on the left side.       Radial pulses are 2+ on the right side, and 2+ on the left side.        Posterior tibial pulses are 2+ on the right side, and 2+ on the left side.   Pulmonary/Chest: Effort normal and breath sounds normal. No respiratory distress. He has no wheezes. He has no rhonchi. He has no rales. He exhibits no tenderness.   Abdominal: Soft. Bowel sounds are normal. He exhibits no distension.   Musculoskeletal: Normal range of motion. He exhibits no edema.   Neurological: He is alert and oriented to person, place, and time.   Skin: Skin is warm, dry and intact. No rash noted. He is not diaphoretic. No cyanosis.   Psychiatric: He has a normal mood and affect. His behavior is normal. Judgment and thought content normal.         ECG 12 Lead  Date/Time: 10/3/2019 9:39 AM  Performed by: Melony Singh APRN  Authorized by: Melony Singh APRN   Comparison: compared with previous ECG from 9/11/2018  Rhythm: sinus rhythm  Rate: normal  Conduction: 1st degree AV block  ST Elevation: V2 and V3  QRS axis: normal    Clinical impression: abnormal EKG  Comments: No significant change            Current Outpatient Medications   Medication Sig Dispense Refill   • allopurinol (ZYLOPRIM) 300 MG tablet allopurinol 300 mg tablet daily     • amLODIPine (NORVASC) 10 MG tablet amlodipine 10 mg tablet daily     • aspirin 325 MG tablet Take 325 mg by mouth Daily.     • bisoprolol (ZEBeta) 10 MG tablet bisoprolol fumarate 10 mg tablet " daily     • Cholecalciferol (VITAMIN D PO) Take 25 mcg by mouth.     • citalopram (CeleXA) 40 MG tablet citalopram 40 mg tablet   Take 1 tablet every day by oral route.     • clopidogrel (PLAVIX) 75 MG tablet clopidogrel 75 mg tablet   Take 1 tablet every day by oral route.     • esomeprazole (nexIUM) 20 MG capsule Take 20 mg by mouth Every Morning Before Breakfast.     • fenofibrate 160 MG tablet Take 160 mg by mouth Daily.     • folic acid (FOLVITE) 1 MG tablet folic acid 1 mg tablet   TAKE ONE TABLET BY MOUTH ONCE DAILY     • glimepiride (AMARYL) 4 MG tablet Take 2 mg by mouth Every Morning Before Breakfast.     • Insulin NPH Human, Isophane, (NOVOLIN N SC) Inject  under the skin into the appropriate area as directed. Units am, 20 units pm     • metFORMIN ER (GLUCOPHAGE-XR) 500 MG 24 hr tablet metformin  mg tablet,extended release 24 hr   TAKE FOUR TABLETS BY MOUTH ONCE DAILY     • nitroglycerin (NITROSTAT) 0.4 MG SL tablet nitroglycerin 0.4 mg sublingual tablet   Place 1 tablet every day by sublingual route prn     • Omega-3 Fatty Acids (OMEGA 3 500 PO) Take  by mouth. 8 Tablets daily     • simvastatin (ZOCOR) 80 MG tablet simvastatin 80 mg tablet   TAKE ONE TABLET BY MOUTH ONCE DAILY       No current facility-administered medications for this visit.      Assessment:       Diagnosis Plan   1. Coronary artery disease involving native coronary artery of native heart with unstable angina pectoris (CMS/HCC)  ECG 12 Lead   2. Essential hypertension     3. Mixed hyperlipidemia     4. Non-rheumatic mitral regurgitation     5. Type 2 diabetes mellitus with other circulatory complication, without long-term current use of insulin (CMS/HCC)     6. Carotid artery plaque, bilateral     7. Unstable angina (CMS/HCC)  Case Request Cath Lab: Left Heart Cath        Orders Placed This Encounter   Procedures   • ECG 12 Lead     This order was created via procedure documentation         Plan:       1.  [id number]  history of severe coronary artery disease.  Multiple interventions including angioplasty, bare metal stent placements, and drug eluding stent placements to the left anterior descending and circumflex.  Last cardiac catheterization in 2010 showed normal left ventricular systolic function in significant disease left anterior descending and circumflex with no evidence of restenosis but total occlusion of the nondominant right coronary artery.  Echocardiogram in 2012 normal LV systolic function.  Coronary Artery Disease  Assessment  • The patient has no angina    Plan  • Lifestyle modifications discussed include adhering to a heart healthy diet, avoidance of tobacco products, maintenance of a healthy weight, medication compliance, regular exercise and regular monitoring of cholesterol and blood pressure    Subjective - Objective  • There has been a previous stent procedure using SAUL  • There has been a previous stent procedure using BMS  • There has been a previous POBA  • Current antiplatelet therapy includes aspirin 81 mg and clopidogrel 75 mg        Now with some worries some chest tightness with associated nausea and diaphoresis which began 2 weeks ago and is been intermittent.  His last episode was on Monday while planting [place] so he stopped.  We will arrange for cardiac catheterization within the next week.  If he has worsening symptoms he is to present to the emergency department and verbalized understanding.  He does not have any nitroglycerin sublingual tablets I will send some to his local pharmacy  2.  Mitral insufficiency last echocardiogram September 2018 mild  3.  Hypertension blood pressure elevated today for some reason he is not on his losartan we will get him back on that prior dose.  We discussed goal 130/80 or less   4.  Mitral regurgitation-mild on echo September 2018  5.  Bilateral carotid plaque no stenosis on duplex September 2018  6.  Hyperlipidemia on target dose simvastatin  lastLDL was at  52 target 70 or less  7.  Diabetes mellitus followed by PCP If needed would consider starting medication to reduce Cardiovascular events such as SGLT2 inhibitors (Jardiance or Invokana ) or GLP-1 receptor agonists such as Victoza.    We will arrange appropriate follow-up pending cardiac catheterization results.      It has been a pleasure to participate in this patient's care.      Thank you,  ALAN Apodaca      **I used Dragon to dictate this note:**

## 2019-10-07 ENCOUNTER — HOSPITAL ENCOUNTER (OUTPATIENT)
Facility: HOSPITAL | Age: 64
Setting detail: HOSPITAL OUTPATIENT SURGERY
Discharge: HOME | End: 2019-10-07
Attending: INTERNAL MEDICINE | Admitting: INTERNAL MEDICINE

## 2019-10-07 VITALS
OXYGEN SATURATION: 97 % | SYSTOLIC BLOOD PRESSURE: 111 MMHG | RESPIRATION RATE: 16 BRPM | HEIGHT: 66 IN | BODY MASS INDEX: 28.93 KG/M2 | DIASTOLIC BLOOD PRESSURE: 58 MMHG | WEIGHT: 180 LBS | HEART RATE: 71 BPM | TEMPERATURE: 98.4 F

## 2019-10-07 DIAGNOSIS — I20.0 UNSTABLE ANGINA (HCC): ICD-10-CM

## 2019-10-07 LAB — GLUCOSE BLDC GLUCOMTR-MCNC: 88 MG/DL (ref 70–130)

## 2019-10-07 PROCEDURE — 25010000002 BH (CUPID ONLY) ADENOSINE 6 MG/100ML MIXTURE: Performed by: INTERNAL MEDICINE

## 2019-10-07 PROCEDURE — C1769 GUIDE WIRE: HCPCS | Performed by: INTERNAL MEDICINE

## 2019-10-07 PROCEDURE — C1894 INTRO/SHEATH, NON-LASER: HCPCS | Performed by: INTERNAL MEDICINE

## 2019-10-07 PROCEDURE — 0 IOPAMIDOL PER 1 ML: Performed by: INTERNAL MEDICINE

## 2019-10-07 PROCEDURE — C1887 CATHETER, GUIDING: HCPCS | Performed by: INTERNAL MEDICINE

## 2019-10-07 PROCEDURE — 25010000002 FENTANYL CITRATE (PF) 100 MCG/2ML SOLUTION: Performed by: INTERNAL MEDICINE

## 2019-10-07 PROCEDURE — 82962 GLUCOSE BLOOD TEST: CPT

## 2019-10-07 PROCEDURE — 85347 COAGULATION TIME ACTIVATED: CPT

## 2019-10-07 PROCEDURE — 93458 L HRT ARTERY/VENTRICLE ANGIO: CPT | Performed by: INTERNAL MEDICINE

## 2019-10-07 PROCEDURE — 93571 IV DOP VEL&/PRESS C FLO 1ST: CPT | Performed by: INTERNAL MEDICINE

## 2019-10-07 PROCEDURE — 99153 MOD SED SAME PHYS/QHP EA: CPT | Performed by: INTERNAL MEDICINE

## 2019-10-07 PROCEDURE — 99152 MOD SED SAME PHYS/QHP 5/>YRS: CPT | Performed by: INTERNAL MEDICINE

## 2019-10-07 PROCEDURE — 25010000002 MIDAZOLAM PER 1 MG: Performed by: INTERNAL MEDICINE

## 2019-10-07 PROCEDURE — 25010000002 HEPARIN (PORCINE) PER 1000 UNITS: Performed by: INTERNAL MEDICINE

## 2019-10-07 RX ORDER — ISOSORBIDE MONONITRATE 30 MG/1
30 TABLET, EXTENDED RELEASE ORAL EVERY MORNING
Qty: 90 TABLET | Refills: 3 | Status: SHIPPED | OUTPATIENT
Start: 2019-10-07 | End: 2020-10-05

## 2019-10-07 RX ORDER — SODIUM CHLORIDE 0.9 % (FLUSH) 0.9 %
10 SYRINGE (ML) INJECTION AS NEEDED
Status: DISCONTINUED | OUTPATIENT
Start: 2019-10-07 | End: 2019-10-07 | Stop reason: HOSPADM

## 2019-10-07 RX ORDER — CHLORTHALIDONE 25 MG/1
25 TABLET ORAL DAILY
COMMUNITY
End: 2019-12-09 | Stop reason: SDUPTHER

## 2019-10-07 RX ORDER — SODIUM CHLORIDE 9 MG/ML
100 INJECTION, SOLUTION INTRAVENOUS CONTINUOUS
Status: DISCONTINUED | OUTPATIENT
Start: 2019-10-07 | End: 2019-10-07 | Stop reason: HOSPADM

## 2019-10-07 RX ORDER — MIDAZOLAM HYDROCHLORIDE 1 MG/ML
INJECTION INTRAMUSCULAR; INTRAVENOUS AS NEEDED
Status: DISCONTINUED | OUTPATIENT
Start: 2019-10-07 | End: 2019-10-07 | Stop reason: HOSPADM

## 2019-10-07 RX ORDER — FENTANYL CITRATE 50 UG/ML
INJECTION, SOLUTION INTRAMUSCULAR; INTRAVENOUS AS NEEDED
Status: DISCONTINUED | OUTPATIENT
Start: 2019-10-07 | End: 2019-10-07 | Stop reason: HOSPADM

## 2019-10-07 RX ORDER — HEPARIN SODIUM 1000 [USP'U]/ML
INJECTION, SOLUTION INTRAVENOUS; SUBCUTANEOUS AS NEEDED
Status: DISCONTINUED | OUTPATIENT
Start: 2019-10-07 | End: 2019-10-07 | Stop reason: HOSPADM

## 2019-10-07 RX ORDER — PIOGLITAZONEHYDROCHLORIDE 45 MG/1
45 TABLET ORAL DAILY
COMMUNITY
End: 2022-05-17 | Stop reason: HOSPADM

## 2019-10-07 RX ORDER — SODIUM CHLORIDE 0.9 % (FLUSH) 0.9 %
3 SYRINGE (ML) INJECTION EVERY 12 HOURS SCHEDULED
Status: DISCONTINUED | OUTPATIENT
Start: 2019-10-07 | End: 2019-10-07 | Stop reason: HOSPADM

## 2019-10-07 RX ORDER — METFORMIN HYDROCHLORIDE 500 MG/1
500 TABLET, EXTENDED RELEASE ORAL DAILY
Start: 2019-10-09 | End: 2022-05-17 | Stop reason: HOSPADM

## 2019-10-07 RX ORDER — SODIUM CHLORIDE 9 MG/ML
125 INJECTION, SOLUTION INTRAVENOUS CONTINUOUS
Status: DISCONTINUED | OUTPATIENT
Start: 2019-10-07 | End: 2019-10-07 | Stop reason: HOSPADM

## 2019-10-07 RX ORDER — LIDOCAINE HYDROCHLORIDE 10 MG/ML
0.1 INJECTION, SOLUTION EPIDURAL; INFILTRATION; INTRACAUDAL; PERINEURAL ONCE AS NEEDED
Status: DISCONTINUED | OUTPATIENT
Start: 2019-10-07 | End: 2019-10-07 | Stop reason: HOSPADM

## 2019-10-07 RX ORDER — LIDOCAINE HYDROCHLORIDE 20 MG/ML
INJECTION, SOLUTION INFILTRATION; PERINEURAL AS NEEDED
Status: DISCONTINUED | OUTPATIENT
Start: 2019-10-07 | End: 2019-10-07 | Stop reason: HOSPADM

## 2019-10-07 RX ADMIN — SODIUM CHLORIDE 125 ML/HR: 9 INJECTION, SOLUTION INTRAVENOUS at 07:39

## 2019-10-07 NOTE — DISCHARGE INSTRUCTIONS
[facility]  [address] [place] [address]    Coronary Angiogram (Radial/Ulnar Approach) After Care    Refer to this sheet in the next few weeks. These instructions provide you with information on caring for yourself after your procedure. Your caregiver may also give you more specific instructions. Your treatment has been planned according to current medical practices, but problems sometimes occur. Call your caregiver if you have any problems or questions after your procedure.    Home Care Instructions:  · You may shower the day after the procedure. Remove the bandage (dressing) and gently wash the site with plain soap and water. Gently pat the site dry. You may apply a band aid daily for 2 days if desired.    · Do not apply powder or lotion to the site.  · Do not submerge the affected site in water for 3 to 5 days or until the site is completely healed.   · Do not lift, push or pull anything over 10 pounds for 2 days after your procedure.  · Inspect the site at least twice daily. You may notice some bruising at the site and it may be tender for 1 to 2 weeks.     · Increase your fluid intake for the next 2 days.    · Keep arm elevated for 24 hours. For the remainder of the day, keep your arm in “Pledge of Allegiance” position when up and about.     · You may drive 24 hours after the procedure unless otherwise instructed by your caregiver.  · Do not operate machinery or power tools for 24 hours.  · A responsible adult should be with you for the first 24 hours after you arrive home. Do not make any important legal decisions or sign legal papers for 24 hours.  Do not drink alcohol for 24 hours.    · Metformin or any medications containing Metformin should not be taken for 48 hours after your procedure.      Call Your Doctor if:   · You have unusual pain at the radial/ulnar (wrist) site.  · You have redness, warmth, swelling, or pain at the radial/ulnar (wrist) site.  · You have drainage (other  than a small amount of blood on the dressing).  · You have chills or a fever > 101.  · Your arm becomes pale or dark, cool, tingly, or numb.  · You have heavy bleeding from the site, hold pressure on the site for 20 minutes.  If the bleeding stops, apply a fresh bandage and call your cardiologist.  However, if you continue to have bleeding, call 911.

## 2019-10-07 NOTE — CONSULTS
Referral received for Phase II Cardiac Rehab.  Staff has reviewed chart and patient does not have a qualifying diagnosis for Phase II Cardiac Rehab at this time.  Staff available if further consultation is needed.

## 2019-10-07 NOTE — INTERVAL H&P NOTE
He has known coronary disease multiple PCI's in the past.  Now has new onset of unstable angina in the last few weeks.  He is referred for left heart cath. H&P reviewed. The patient was examined and there are no changes to the H&P. I have explained the risks and benefits of the procedure to the patient.  The patient understands and agrees to proceed

## 2019-10-08 LAB — ACT BLD: 202 SECONDS (ref 82–152)

## 2019-10-21 ENCOUNTER — OFFICE VISIT (OUTPATIENT)
Dept: CARDIOLOGY | Facility: CLINIC | Age: 64
End: 2019-10-21

## 2019-10-21 VITALS
HEART RATE: 62 BPM | HEIGHT: 66 IN | DIASTOLIC BLOOD PRESSURE: 72 MMHG | SYSTOLIC BLOOD PRESSURE: 148 MMHG | WEIGHT: 181 LBS | RESPIRATION RATE: 16 BRPM | BODY MASS INDEX: 29.09 KG/M2

## 2019-10-21 DIAGNOSIS — Z72.0 TOBACCO USE: ICD-10-CM

## 2019-10-21 DIAGNOSIS — I10 ESSENTIAL HYPERTENSION: ICD-10-CM

## 2019-10-21 DIAGNOSIS — I65.23 CAROTID ARTERY PLAQUE, BILATERAL: ICD-10-CM

## 2019-10-21 DIAGNOSIS — I25.110 CORONARY ARTERY DISEASE INVOLVING NATIVE CORONARY ARTERY OF NATIVE HEART WITH UNSTABLE ANGINA PECTORIS (HCC): Primary | ICD-10-CM

## 2019-10-21 DIAGNOSIS — E11.59 TYPE 2 DIABETES MELLITUS WITH OTHER CIRCULATORY COMPLICATION, WITHOUT LONG-TERM CURRENT USE OF INSULIN (HCC): ICD-10-CM

## 2019-10-21 DIAGNOSIS — E78.2 MIXED HYPERLIPIDEMIA: ICD-10-CM

## 2019-10-21 PROCEDURE — 99214 OFFICE O/P EST MOD 30 MIN: CPT | Performed by: NURSE PRACTITIONER

## 2019-10-21 PROCEDURE — 99406 BEHAV CHNG SMOKING 3-10 MIN: CPT | Performed by: NURSE PRACTITIONER

## 2019-10-21 PROCEDURE — 93000 ELECTROCARDIOGRAM COMPLETE: CPT | Performed by: NURSE PRACTITIONER

## 2019-10-21 NOTE — PROGRESS NOTES
Date of Office Visit: 10/21/19  Encounter Provider: ALAN Apodaca  Place of Service: [facility]  Patient Name: Fernando Gauthier  :1955    Chief Complaint   Patient presents with   • Coronary Artery Disease   • Follow-up   :     HPI: Fernando Gauthier is a 64 y.o. male  with history of hypertension, hyperlipidemia, diabetes mellitus, mitral regurgitation, coronary artery disease status post multiple coronary stent placement, and bilateral carotid artery plaque.  He is a patient of Dr. Osullivan.  I am seeing him in follow up today and have reviewed his medical record.     He is had severe disease of the proximal, mid and the distal LAD as well as disease of the marginal branch.  He had angioplasty to multiple areas of the LAD back in 2001 and then recurrent chest discomfort 2004.  Repeat cardiac catheterization showed all stents in the LAD were patent but 70% stenosis of the obtuse marginal.  He had angioplasty and stent placed to the marginal.  Then 2008 had recurrent cardiac symptoms perfusion stress test was unremarkable proceeded with cardiac catheterization which showed severe stenosis of the proximal left anterior descending, widely patent mid distal and apical stents with borderline disease of the OM1 with 80% in the mid circumflex, right coronary artery was free of disease and he had angioplasty stent placement of the circumflex as well as to the proximal LAD.  Then in  he presented with progressive dyspnea and had a repeat cardiac catheterization showed normal left ventricular systolic function with total occlusion of the right coronary artery not amenable to angioplasty of the vessels were free of obstructive disease and since the right coronary artery was nondominant he was treated medically.  Follow-up Echocardiogram in  showed normal left ventricular systolic function, moderately dilated left atrium no significant valvular disease.   He had a perfusion stress test at that time which showed an ejection fraction 43% with no ischemia.     He was established in this office in September 2018.  He had a carotid duplex September 2018 which showed bilateral plaque no stenosis.  He also had an echocardiogram which showed normal left ventricular systolic function with an EF of 59%, grade 1 diastolic dysfunction with mild mitral regurgitation.    On 10/3/2019 complaining of intermittent chest tightness x2 weeks associated with nausea diaphoresis and shortness of breath.  He had cardiac catheterization completed 10/7/2019 which showed a very short and normal left main, 20-30% LAD disease with a proximal stent that was widely patent mid LAD stent which was widely patent and another mid to distal LAD stent which was widely patent.  There is a 30% lesion.  Apical LAD stent was widely patent.  There was a 40% area but then a very apical LAD stent with 20% diffuse restenosis.  No ramus intermedius.  The circumflex had a stent in the proximal portion which was widely patent.  There was a large stent in the midportion with 50% restenosis and a very small 1 to with 40-50% disease too small to intervene.  There was an FFR and RFR on the mid circumflex stent which was not hemodynamically significant.  The right coronary artery was small nondominant with 100% occluded right coronary artery with collateral flow.  Imdur was added to his regimen.      He presents today for two-week hospital follow-up.  He has had no further chest pain.  He reports that he believes he is breathing better.  He is walking 10 to 15 minutes most days of the week without recurrent chest pain.  He denies palpitation, shortness of breath, edema, near-syncope, syncope, or fatigue.  He checks his blood pressure at home which is anywhere from 124-140 systolic.  Unfortunately, he continues to smoke 1/4 pack of cigarettes daily.  He does intend on stopping.  In the past he stopped for 3 years. He  "denies any new headaches.         No Known Allergies    Past Medical History:   Diagnosis Date   • Coronary artery disease    • Diabetes mellitus (CMS/HCC)    • GERD (gastroesophageal reflux disease)    • Gout    • Hiatal hernia    • History of kidney cancer    • Hyperlipidemia    • Hypertension        Past Surgical History:   Procedure Laterality Date   • AVULSION TOENAIL PLATE     • CARDIAC CATHETERIZATION     • CARDIAC CATHETERIZATION N/A 10/7/2019    Procedure: Left Heart Cath;  Surgeon: Fernando Montalvo MD;  Location: [facility] CATH INVASIVE LOCATION;  Service: Cardiovascular   • CARDIAC CATHETERIZATION  10/7/2019    Procedure: Functional Flow [place];  Surgeon: Fernando Montalvo MD;  [facility] C[facility] INVASIVE LOC[facility];  Service: Cardiovascular   • CARDIAC CATHETERIZATION N/A 10/7/2019    Procedure: Coronary angiography;  Surgeon: Fernando Montalvo MD;  [facility] CATH INV[facility]IVE LOCATION;  Service: Cardiovascular   • CARDIAC CATHETERIZATION N/A 10/7/2019    Procedure: Left ventriculography;  Surgeon: Fernando Montalvo MD;  Location: [facility] CATH INVASIVE LOCATION;  Service: Cardiovascular   • CHOLECYSTECTOMY     • CORONARY ANGIOPLASTY     • CORONARY STENT PLACEMENT     • EYE SURGERY     • NEPHRECTOMY Left          Family and social history reviewed.     ROS  All other systems were reviewed and are negative          Objective:     Vitals:    10/21/19 0918   BP: 148/72   BP Location: Right arm   Patient Position: Sitting   Pulse: 62   Resp: 16   Weight: 82.1 kg (181 lb)   Height: 167.6 cm (66\")     Body mass index is 29.21 kg/m².    PHYSICAL EXAM:  Physical Exam   Constitutional: He is oriented to person, place, and time. He appears well-developed and well-nourished. No distress.   HENT:   Head: Normocephalic.   Eyes: Conjunctivae are normal.   Neck: Normal range of motion. No JVD present.   Cardiovascular: Normal rate, regular rhythm, normal heart sounds and intact distal pulses.   No murmur heard.  Pulses:   "     Carotid pulses are 2+ on the right side, and 2+ on the left side.       Radial pulses are 2+ on the right side, and 2+ on the left side.        Posterior tibial pulses are 2+ on the right side, and 2+ on the left side.   Pulmonary/Chest: Effort normal and breath sounds normal. No respiratory distress. He has no wheezes. He has no rhonchi. He has no rales. He exhibits no tenderness.   Abdominal: Soft. Bowel sounds are normal. He exhibits no distension.   Musculoskeletal: Normal range of motion. He exhibits no edema.   Neurological: He is alert and oriented to person, place, and time.   Skin: Skin is warm, dry and intact. No rash noted. He is not diaphoretic. No cyanosis.   Psychiatric: He has a normal mood and affect. His behavior is normal. Judgment and thought content normal.         ECG 12 Lead  Date/Time: 10/21/2019 9:26 AM  Performed by: Melony Singh APRN  Authorized by: Melony Singh APRN   Comparison: compared with previous ECG   Similar to previous ECG  Rhythm: sinus rhythm  Rate: normal  Conduction: 1st degree AV block  Other findings: non-specific ST-T wave changes    Clinical impression: abnormal EKG  Comments: No significant change            Current Outpatient Medications   Medication Sig Dispense Refill   • allopurinol (ZYLOPRIM) 300 MG tablet allopurinol 300 mg tablet daily     • amLODIPine (NORVASC) 10 MG tablet amlodipine 10 mg tablet daily     • aspirin 325 MG tablet Take 325 mg by mouth Daily.     • bisoprolol (ZEBeta) 10 MG tablet bisoprolol fumarate 10 mg tablet daily     • chlorthalidone (HYGROTON) 25 MG tablet Take 25 mg by mouth Daily.     • Cholecalciferol (VITAMIN D PO) Take 25 mcg by mouth 2 (Two) Times a Day.     • citalopram (CeleXA) 40 MG tablet citalopram 40 mg tablet   Take 1 tablet every day by oral route.     • clopidogrel (PLAVIX) 75 MG tablet clopidogrel 75 mg tablet   Take 1 tablet every day by oral route.     • esomeprazole (nexIUM) 20 MG capsule Take 20 mg by mouth Every  Morning Before Breakfast.     • fenofibrate 160 MG tablet Take 160 mg by mouth Daily.     • folic acid (FOLVITE) 1 MG tablet folic acid 1 mg tablet   TAKE ONE TABLET BY MOUTH ONCE DAILY     • glimepiride (AMARYL) 4 MG tablet Take 4 mg by mouth Every Morning Before Breakfast.     • Insulin NPH Human, Isophane, (NOVOLIN N SC) Inject 30 Units under the skin into the appropriate area as directed Daily.     • isosorbide mononitrate (IMDUR) 30 MG 24 hr tablet Take 1 tablet by mouth Every Morning. 90 tablet 3   • losartan (COZAAR) 100 MG tablet Take 1 tablet by mouth Daily. 30 tablet 11   • metFORMIN ER (GLUCOPHAGE-XR) 500 MG 24 hr tablet Take 1 tablet by mouth Daily.     • nitroglycerin (NITROSTAT) 0.4 MG SL tablet Place 1 tablet under the tongue Every 5 (Five) Minutes As Needed for Chest Pain. Take no more than 3 doses in 15 minutes. 30 tablet 1   • Omega-3 Fatty Acids (OMEGA 3 500 PO) Take  by mouth. 8 Tablets daily     • pioglitazone (ACTOS) 45 MG tablet Take 45 mg by mouth Daily.     • simvastatin (ZOCOR) 80 MG tablet simvastatin 80 mg tablet   TAKE ONE/HALF TABLET BY MOUTH ONCE DAILY       No current facility-administered medications for this visit.      Assessment:       Diagnosis Plan   1. Coronary artery disease involving native coronary artery of native heart with unstable angina pectoris (CMS/HCC)  ECG 12 Lead   2. Essential hypertension     3. Mixed hyperlipidemia     4. Type 2 diabetes mellitus with other circulatory complication, without long-term current use of insulin (CMS/HCC)     5. Carotid artery plaque, bilateral          Orders Placed This Encounter   Procedures   • ECG 12 Lead     This order was created via procedure documentation         Plan:       1.  [id number] history of severe coronary artery disease.  Multiple interventions including angioplasty, bare metal stent placements, and drug eluding stent placements to the left anterior descending and circumflex.  Last cardiac catheterization  in 2010 showed normal left ventricular systolic function in significant disease left anterior descending and circumflex with no evidence of restenosis but total occlusion of the nondominant right coronary artery.  Echocardiogram in 2012 normal LV systolic function.  Then recurrent symptoms repeat catheterization October 2019 all stents widely patent.  Insignificant FFR of mid circumflex stent.  Right coronary artery 100% occluded proximally with collateral flow now on Imdur for medical management  Coronary Artery Disease  Assessment  • The patient has no angina    Plan  • Lifestyle modifications discussed include adhering to a heart healthy diet, avoidance of tobacco products, maintenance of a healthy weight, medication compliance, regular exercise and regular monitoring of cholesterol and blood pressure    Subjective - Objective  • There has been a previous stent procedure using SAUL  • There has been a previous stent procedure using BMS  • There has been a previous POBA  • Current antiplatelet therapy includes aspirin 81 mg and clopidogrel 75 mg      Doing better on Imdur.  He follows his blood pressure at home which is anywhere from 124-140 systolic.  Encourage increased physical activity and that he stop smoking.  He will follow-up in 1 month    2.  Mitral insufficiency last echocardiogram September 2018 mild  3.  Hypertension blood pressure elevated today he is back on losartan 100 mg in addition to amlodipine 5 mg chlorthalidone 25 mg, and bisoprolol 10 mg, now on Imdur 30 mg   We discussed goal 130/80 or less, as above.  If needed we could try to increase bisoprolol but a little hesitant with first-degree AV block and heart rate 50-60s.  Would consider hydralazine  4.  Mitral regurgitation-mild on echo September 2018  5.  Bilateral carotid plaque no stenosis on duplex September 2018  6.  Hyperlipidemia on target dose simvastatin  last LDL was at 52 target 70 or less  7.  Diabetes mellitus on insulin   8.   Tobacco use.  Unfortunately, he continues to smoke 1/4 pack of cigarettes daily.  He has desire to quit.  In the past he quit for 3 years.  He has been smoking for over 40 years.  I spent 30 minutes discussing this with him today      It has been a pleasure to participate in this patient's care.      Thank you,  ALAN Apodaca      **I used Dragon to dictate this note:**

## 2019-11-13 ENCOUNTER — OFFICE VISIT (OUTPATIENT)
Dept: CARDIOLOGY | Facility: CLINIC | Age: 64
End: 2019-11-13

## 2019-11-13 VITALS
DIASTOLIC BLOOD PRESSURE: 80 MMHG | HEART RATE: 61 BPM | SYSTOLIC BLOOD PRESSURE: 124 MMHG | HEIGHT: 66 IN | WEIGHT: 183 LBS | BODY MASS INDEX: 29.41 KG/M2

## 2019-11-13 DIAGNOSIS — I25.10 CORONARY ARTERY DISEASE INVOLVING NATIVE CORONARY ARTERY OF NATIVE HEART WITHOUT ANGINA PECTORIS: Primary | ICD-10-CM

## 2019-11-13 DIAGNOSIS — I10 ESSENTIAL HYPERTENSION: ICD-10-CM

## 2019-11-13 DIAGNOSIS — E11.59 TYPE 2 DIABETES MELLITUS WITH OTHER CIRCULATORY COMPLICATION, WITHOUT LONG-TERM CURRENT USE OF INSULIN (HCC): ICD-10-CM

## 2019-11-13 DIAGNOSIS — E78.2 MIXED HYPERLIPIDEMIA: ICD-10-CM

## 2019-11-13 PROCEDURE — 99214 OFFICE O/P EST MOD 30 MIN: CPT | Performed by: INTERNAL MEDICINE

## 2019-11-13 PROCEDURE — 93000 ELECTROCARDIOGRAM COMPLETE: CPT | Performed by: INTERNAL MEDICINE

## 2019-11-13 RX ORDER — SILDENAFIL CITRATE 20 MG/1
20 TABLET ORAL AS NEEDED
COMMUNITY
End: 2022-05-17 | Stop reason: HOSPADM

## 2019-11-13 NOTE — PROGRESS NOTES
Date of Office Visit: 19  Encounter Provider: Jose M Osullivan MD  Place of Service: [facility] CARDIOLOGY  Patient Name: Fernando Gauthier  :1955  Referral Provider:Jose M Osullivan MD      Chief Complaint   Patient presents with   • Follow-up   • Coronary Artery Disease     History of Present Illness  Mr Gauthier is a 64 yo gentleman with a history of coronary disease. He also has history of diabetes mellitus, hypertension, and hyperlipidemia.   He had severe disease of the proximal mid and the distal left anterior descending: Disease of the marginal.  He had angioplasty of multiple areas in the left anterior descending back in 2001.  Then recurrent chest discomfort in 2004.  Repeat cardiac catheterization showed all the stents in left anterior descending were patent but had 70% stenosis of the obtuse marginal and nondominant right coronary artery had angioplasty and stent placement of the marginal vessel.  Then in 2008 he had recurrent cardiac symptoms perfusion stress test was unremarkable he then had cardiac catheterization which showed severe stenosis of the proximal anterior descending widely patent mid distal and apical stents borderline disease the OM1 80 this is the mid circumflex right coronary artery was free disease had angioplasty stent placement of the circumflex and left anterior descending with drug-eluting stents.  Then in  he presented with progressive dyspnea repeat cardiac catheterization showed normal left ventricular systolic function.  Total occlusion of the right coronary artery not amenable to angioplasty other vessels are free disease was a nondominant vessel he was treated medically.  Follow-up echocardiogram in  showed normal left ventricular ejection fraction moderately dilated left atrium no significant valvular disease.    On 10/3/2019 complaining of intermittent chest tightness x2 weeks associated with nausea  diaphoresis and shortness of breath.  He had cardiac catheterization completed 10/7/2019 which showed a very short and normal left main, 20-30% LAD disease with a proximal stent that was widely patent mid LAD stent which was widely patent and another mid to distal LAD stent which was widely patent.   There was a very apical LAD stent with 20% diffuse restenosis.  No ramus intermedius.  The circumflex had a stent in the proximal portion which was widely patent.  There was a large stent in the midportion with 50% restenosis and a very small 1 to with 40-50% disease too small to intervene.  There was an FFR and RFR on the mid circumflex stent which was not hemodynamically significant.  The right coronary artery was small nondominant with 100% occluded right coronary artery with collateral flow.  Imdur was added to his regimen.  He now comes in for follow-up.The patient denies chest pain, pressure and heaviness. No shortness of breath, othopnea or PND. No palpitations, near syncope or syncope. No stroke type symptoms like paralysis, paresthesia, abrupt vision loss and dysarthria. No bleeding like blood in the stool or dark stools.  He is doing walking 20 minutes 3 to 4 days a week.  He still smoking cigarettes unfortunately but overall feels like he is doing well.          Past Medical History:   Diagnosis Date   • Coronary artery disease    • Diabetes mellitus (CMS/HCC)    • GERD (gastroesophageal reflux disease)    • Gout    • Hiatal hernia    • History of kidney cancer    • Hyperlipidemia    • Hypertension          Past Surgical History:   Procedure Laterality Date   • AVULSION TOENAIL PLATE     • CARDIAC CATHETERIZATION     • CARDIAC CATHETERIZATION N/A 10/7/2019    Procedure: Left Heart Cath;  Surgeon: Fernando Montalvo MD;  Location: [facility] CATH INVASIVE LOCATION;  Service: Cardiovascular   • CARDIAC CATHETERIZATION  10/7/2019    Procedure: Functional Flow [place];  Surgeon: Fernando Montalvo MD;  Location: [facility]  CATH INVASIVE LOCATION;  Service: Cardiovascular   • CARDIAC CATHETERIZATION N/A 10/7/2019    Procedure: Coronary angiography;  Surgeon: Fernando Montalvo MD;  Location: [facility] CATH INVASIVE LOCATION;  Service: Cardiovascular   • CARDIAC CATHETERIZATION N/A 10/7/2019    Procedure: Left ventriculography;  Surgeon: Fernando Montalvo MD;  Location: [facility] CATH INVASIVE LOCATION;  Service: Cardiovascular   • CHOLECYSTECTOMY     • CORONARY ANGIOPLASTY     • CORONARY STENT PLACEMENT     • EYE SURGERY     • NEPHRECTOMY Left          Current Outpatient Medications on File Prior to Visit   Medication Sig Dispense Refill   • allopurinol (ZYLOPRIM) 300 MG tablet allopurinol 300 mg tablet daily     • amLODIPine (NORVASC) 10 MG tablet amlodipine 10 mg tablet daily     • aspirin 325 MG tablet Take 325 mg by mouth Daily.     • bisoprolol (ZEBeta) 10 MG tablet bisoprolol fumarate 10 mg tablet daily     • chlorthalidone (HYGROTON) 25 MG tablet Take 25 mg by mouth Daily.     • Cholecalciferol (VITAMIN D PO) Take 25 mcg by mouth 2 (Two) Times a Day.     • citalopram (CeleXA) 40 MG tablet citalopram 40 mg tablet   Take 1 tablet every day by oral route.     • clopidogrel (PLAVIX) 75 MG tablet clopidogrel 75 mg tablet   Take 1 tablet every day by oral route.     • esomeprazole (nexIUM) 20 MG capsule Take 20 mg by mouth Every Morning Before Breakfast.     • fenofibrate 160 MG tablet Take 160 mg by mouth Daily.     • folic acid (FOLVITE) 1 MG tablet folic acid 1 mg tablet   TAKE ONE TABLET BY MOUTH ONCE DAILY     • glimepiride (AMARYL) 4 MG tablet Take 4 mg by mouth Every Morning Before Breakfast.     • Insulin NPH Human, Isophane, (NOVOLIN N SC) Inject 30 Units under the skin into the appropriate area as directed Daily.     • isosorbide mononitrate (IMDUR) 30 MG 24 hr tablet Take 1 tablet by mouth Every Morning. 90 tablet 3   • losartan (COZAAR) 100 MG tablet Take 1 tablet by mouth Daily. 30 tablet 11   • metFORMIN ER (GLUCOPHAGE-XR) 500 MG  24 hr tablet Take 1 tablet by mouth Daily.     • nitroglycerin (NITROSTAT) 0.4 MG SL tablet Place 1 tablet under the tongue Every 5 (Five) Minutes As Needed for Chest Pain. Take no more than 3 doses in 15 minutes. 30 tablet 1   • Omega-3 Fatty Acids (OMEGA 3 500 PO) Take  by mouth. 8 Tablets daily     • pioglitazone (ACTOS) 45 MG tablet Take 45 mg by mouth Daily.     • sildenafil (REVATIO) 20 MG tablet Take 20 mg by mouth As Needed.     • simvastatin (ZOCOR) 80 MG tablet simvastatin 80 mg tablet   TAKE ONE/HALF TABLET BY MOUTH ONCE DAILY       No current facility-administered medications on file prior to visit.          Social History     Socioeconomic History   • Marital status:      Spouse name: Not on file   • Number of children: 1   • Years of education: Not on file   • Highest education level: Not on file   Occupational History   • Occupation: retired   Tobacco Use   • Smoking status: Light Tobacco Smoker     Packs/day: 0.25     Years: 40.00     Pack years: 10.00   • Smokeless tobacco: Never Used   • Tobacco comment: caffeine use   Substance and Sexual Activity   • Alcohol use: Yes     Comment: rare   Lifestyle   • Physical activity:     Days per week: 3 days     Minutes per session: 20 min   • Stress: Not on file       Family History   Problem Relation Age of Onset   • Heart disease Mother    • Diabetes Mother    • Stomach cancer Mother    • Heart disease Father    • Heart disease Sister    • Stroke Sister    • Diabetes Sister    • Heart disease Brother    • Stroke Brother    • Hypertension Brother    • Heart disease Sister    • Diabetes Sister    • Heart disease Sister    • Heart disease Sister    • Diabetes Sister    • Liver cancer Brother    • Heart attack Other          Review of Systems   Constitution: Negative for decreased appetite, diaphoresis, fever, weakness, malaise/fatigue, weight gain and weight loss.   HENT: Negative for congestion, hearing loss, nosebleeds and tinnitus.    Eyes: Negative  "for blurred vision, double vision, vision loss in left eye, vision loss in right eye and visual disturbance.   Cardiovascular:        As noted in HPI   Respiratory:        As noted HPI   Endocrine: Negative for cold intolerance and heat intolerance.   Hematologic/Lymphatic: Negative for bleeding problem. Does not bruise/bleed easily.   Skin: Negative for color change, flushing, itching and rash.   Musculoskeletal: Negative for arthritis, back pain, joint pain, joint swelling, muscle weakness and myalgias.   Gastrointestinal: Negative for bloating, abdominal pain, constipation, diarrhea, dysphagia, heartburn, hematemesis, hematochezia, melena, nausea and vomiting.   Genitourinary: Negative for bladder incontinence, dysuria, frequency, nocturia and urgency.   Neurological: Negative for dizziness, focal weakness, headaches, light-headedness, loss of balance, numbness, paresthesias and vertigo.   Psychiatric/Behavioral: Negative for depression, memory loss and substance abuse.       Procedures      ECG 12 Lead  Date/Time: 11/13/2019 1:44 PM  Performed by: Jose M Osullivan MD  Authorized by: Jose M Osullivan MD   Comparison: compared with previous ECG   Similar to previous ECG  Rhythm: sinus rhythm  Rate: normal  Conduction: 1st degree AV block  QRS axis: normal                  Objective:    /80 (BP Location: Right arm)   Pulse 61   Ht 167.6 cm (66\")   Wt 83 kg (183 lb)   BMI 29.54 kg/m²        Physical Exam  Physical Exam   Constitutional: He is oriented to person, place, and time. He appears well-developed and well-nourished. No distress.   HENT:   Head: Normocephalic.   Eyes: Conjunctivae are normal. Pupils are equal, round, and reactive to light. No scleral icterus.   Neck: Normal carotid pulses, no hepatojugular reflux and no JVD present. Carotid bruit is not present. No tracheal deviation, no edema and no erythema present. No thyromegaly present.   Cardiovascular: Normal rate, regular rhythm, S1 " normal, S2 normal, normal heart sounds and intact distal pulses.  No extrasystoles are present. PMI is not displaced. Exam reveals no gallop, no distant heart sounds and no friction rub.   No murmur heard.  Pulses:       Carotid pulses are 2+ on the right side, and 2+ on the left side.       Radial pulses are 2+ on the right side, and 2+ on the left side.        Femoral pulses are 2+ on the right side, and 2+ on the left side.       Dorsalis pedis pulses are 2+ on the right side, and 2+ on the left side.        Posterior tibial pulses are 2+ on the right side, and 2+ on the left side.   Pulmonary/Chest: Effort normal and breath sounds normal. No respiratory distress. He has no decreased breath sounds. He has no wheezes. He has no rhonchi. He has no rales. He exhibits no tenderness.   Abdominal: Soft. Bowel sounds are normal. He exhibits no distension and no mass. There is no hepatosplenomegaly. There is no tenderness. There is no rebound and no guarding.   Musculoskeletal: He exhibits no edema, tenderness or deformity.   Neurological: He is alert and oriented to person, place, and time.   Skin: Skin is warm and dry. No rash noted. He is not diaphoretic. No cyanosis or erythema. No pallor. Nails show no clubbing.   Psychiatric: He has a normal mood and affect. His speech is normal and behavior is normal. Judgment and thought content normal.           Assessment:   1.  [id number] history of severe coronary artery disease.  Multiple interventions including angioplasty, bare metal stent placements, and drug eluding stent placements to the left anterior descending and circumflex.  Last cardiac catheterization in 2010 showed normal left ventricular systolic function in significant disease left anterior descending and circumflex with no evidence of restenosis but total occlusion of the nondominant right coronary artery.  Echocardiogram in 2012 normal LV systolic function.  Then recurrent symptoms repeat  catheterization October 2019 all stents were patent.  Insignificant FFR of mid circumflex stent.  Right coronary artery 100% occluded proximally with collateral flow now on Imdur for medical management.  Normal left ventricular ejection fraction at that time with mild anterior hypokinesis.  The only ischemic area is very small nondominant right coronary artery.  Coronary Artery Disease  Assessment  • The patient has no angina     Plan  • Lifestyle modifications discussed include adhering to a heart healthy diet, avoidance of tobacco products, maintenance of a healthy weight, medication compliance, regular exercise and regular monitoring of cholesterol and blood pressure     Subjective - Objective  • There has been a previous stent procedure using SAUL  • There has been a previous stent procedure using BMS  • There has been a previous POBA  • Current antiplatelet therapy includes aspirin 81 mg and clopidogrel 75 mg   Doing well no recurrent angina continue the same he will see us again in follow-up in 1 year and call if problems.     2.  Mitral insufficiency last echocardiogram September 2018 mild  3.  Hypertension.  Blood pressure at goal.  4.  Bilateral carotid plaque no stenosis on duplex September 2018  5.  Hyperlipidemia on target dose simvastatin  last LDL was at 52 target 70 or less  6.  Diabetes mellitus on insulin   7.  Tobacco use.  Unfortunately, he continues to smoke 1/4 pack of cigarettes daily.    We again today encouraged him to discontinue this.       Plan:

## 2019-12-09 RX ORDER — CHLORTHALIDONE 25 MG/1
TABLET ORAL
Qty: 30 TABLET | Refills: 2 | Status: SHIPPED | OUTPATIENT
Start: 2019-12-09 | End: 2020-03-03

## 2020-01-02 ENCOUNTER — TRANSCRIBE ORDERS (OUTPATIENT)
Dept: ADMINISTRATIVE | Facility: HOSPITAL | Age: 65
End: 2020-01-02

## 2020-01-02 DIAGNOSIS — N18.30 CHRONIC KIDNEY DISEASE, STAGE III (MODERATE) (HCC): Primary | ICD-10-CM

## 2020-01-06 ENCOUNTER — HOSPITAL ENCOUNTER (OUTPATIENT)
Dept: ULTRASOUND IMAGING | Facility: HOSPITAL | Age: 65
Discharge: HOME | End: 2020-01-06
Admitting: FAMILY MEDICINE

## 2020-01-06 DIAGNOSIS — N18.30 CHRONIC KIDNEY DISEASE, STAGE III (MODERATE) (HCC): ICD-10-CM

## 2020-01-06 PROCEDURE — 76775 US EXAM ABDO BACK WALL LIM: CPT

## 2020-02-19 ENCOUNTER — LAB (OUTPATIENT)
Dept: LAB | Facility: HOSPITAL | Age: 65
End: 2020-02-19

## 2020-02-19 ENCOUNTER — CONSULT (OUTPATIENT)
Dept: ONCOLOGY | Facility: CLINIC | Age: 65
End: 2020-02-19

## 2020-02-19 VITALS
BODY MASS INDEX: 31.29 KG/M2 | OXYGEN SATURATION: 98 % | HEIGHT: 65 IN | RESPIRATION RATE: 12 BRPM | WEIGHT: 187.8 LBS | TEMPERATURE: 98.5 F | HEART RATE: 59 BPM | SYSTOLIC BLOOD PRESSURE: 155 MMHG | DIASTOLIC BLOOD PRESSURE: 69 MMHG

## 2020-02-19 DIAGNOSIS — D64.9 ANEMIA, UNSPECIFIED TYPE: Primary | ICD-10-CM

## 2020-02-19 DIAGNOSIS — D64.9 NORMOCYTIC ANEMIA: Primary | ICD-10-CM

## 2020-02-19 LAB
BASOPHILS # BLD AUTO: 0.07 10*3/MM3 (ref 0–0.2)
BASOPHILS NFR BLD AUTO: 0.8 % (ref 0–1.5)
DEPRECATED RDW RBC AUTO: 46.2 FL (ref 37–54)
EOSINOPHIL # BLD AUTO: 0.49 10*3/MM3 (ref 0–0.4)
EOSINOPHIL NFR BLD AUTO: 5.8 % (ref 0.3–6.2)
ERYTHROCYTE [DISTWIDTH] IN BLOOD BY AUTOMATED COUNT: 13.6 % (ref 12.3–15.4)
HCT VFR BLD AUTO: 39.8 % (ref 37.5–51)
HGB BLD-MCNC: 13.4 G/DL (ref 13–17.7)
IMM GRANULOCYTES # BLD AUTO: 0.09 10*3/MM3 (ref 0–0.05)
IMM GRANULOCYTES NFR BLD AUTO: 1.1 % (ref 0–0.5)
LYMPHOCYTES # BLD AUTO: 2.4 10*3/MM3 (ref 0.7–3.1)
LYMPHOCYTES NFR BLD AUTO: 28.3 % (ref 19.6–45.3)
MCH RBC QN AUTO: 30.9 PG (ref 26.6–33)
MCHC RBC AUTO-ENTMCNC: 33.7 G/DL (ref 31.5–35.7)
MCV RBC AUTO: 91.7 FL (ref 79–97)
MONOCYTES # BLD AUTO: 0.57 10*3/MM3 (ref 0.1–0.9)
MONOCYTES NFR BLD AUTO: 6.7 % (ref 5–12)
NEUTROPHILS # BLD AUTO: 4.87 10*3/MM3 (ref 1.7–7)
NEUTROPHILS NFR BLD AUTO: 57.3 % (ref 42.7–76)
NRBC BLD AUTO-RTO: 0 /100 WBC (ref 0–0.2)
PLATELET # BLD AUTO: 312 10*3/MM3 (ref 140–450)
PMV BLD AUTO: 10.1 FL (ref 6–12)
RBC # BLD AUTO: 4.34 10*6/MM3 (ref 4.14–5.8)
WBC NRBC COR # BLD: 8.49 10*3/MM3 (ref 3.4–10.8)

## 2020-02-19 PROCEDURE — 99204 OFFICE O/P NEW MOD 45 MIN: CPT | Performed by: INTERNAL MEDICINE

## 2020-02-19 PROCEDURE — 36416 COLLJ CAPILLARY BLOOD SPEC: CPT

## 2020-02-19 PROCEDURE — 85025 COMPLETE CBC W/AUTO DIFF WBC: CPT

## 2020-02-19 RX ORDER — BUPROPION HYDROCHLORIDE 300 MG/1
TABLET ORAL
COMMUNITY
End: 2020-11-12

## 2020-02-19 NOTE — PROGRESS NOTES
REFERRING PROVIDER:    Jeff Keating MD  [address] [place] [address]    REASON FOR CONSULTATION:    Anemia    HISTORY OF PRESENT ILLNESS:  Fernando Gauthier is a 64 y.o. male who is referred today for further evaluation of anemia.    His hemoglobin on 10/3/2019 was normal at 15.0.  Labs on 12/20/2019 showed hemoglobin of 11.7 with an MCV of 92.  White blood cells and platelets were normal.  Labs on 12/27/2019 showed a vitamin B12 of 302, folic acid greater than 20, iron 128 with 31% iron saturation.  Hemoglobin was 11.6.  Labs on 1/7/2020 showed persistent anemia with a hemoglobin of 11.7 and hematocrit 35.9%.  White blood cells and platelets were normal.  The MCV was normal at 93.    He did have a cardiac catheterization on 10/7/2019 by Dr. Montalvo with patent stents visualized.    Pathology from a colonoscopy 12/31/2019 showed normal colon mucosa with no evidence for microscopic colitis.       Over the past few months he has been a little fatigued.  This is improving a little bit.  He denies any bleeding.  No jaundice.  No current nausea vomiting or diarrhea.  No recent change in medications.    Past Medical History:   Diagnosis Date   • Coronary artery disease    • Depression    • Diabetes mellitus (CMS/HCC)    • GERD (gastroesophageal reflux disease)    • Gout    • Hiatal hernia    • History of hemorrhoids    • History of kidney cancer    • Hyperlipidemia    • Hypertension    • Pancreatitis    • Ulcer of gastric fundus        Past Surgical History:   Procedure Laterality Date   • AVULSION TOENAIL PLATE     • CARDIAC CATHETERIZATION     • CARDIAC CATHETERIZATION N/A 10/7/2019    Procedure: Left Heart Cath;  Surgeon: Fernando Montalvo MD;  Location:  [facility] CATH INVASIVE LOCATION;  Service: Cardiovascular   • CARDIAC CATHETERIZATION  10/7/2019    Procedure: Functional Flow [place];  Surgeon: Fernando Montalvo MD;  Location:  [facility] CATH INVASIVE LOCATION;  Service: Cardiovascular   • CARDIAC  CATHETERIZATION N/A 10/7/2019    Procedure: Coronary angiography;  Surgeon: Fernando Montalvo MD;  Location:  [facility] CATH INVASIVE LOCATION;  Service: Cardiovascular   • CARDIAC CATHETERIZATION N/A 10/7/2019    Procedure: Left ventriculography;  Surgeon: Fernando Montalvo MD;  Location:  [facility] CATH INVASIVE LOCATION;  Service: Cardiovascular   • CHOLECYSTECTOMY     • COLONOSCOPY  2010   • CORONARY ANGIOPLASTY  2008   • CORONARY STENT PLACEMENT     • ENDOSCOPY  2010   • EYE SURGERY Left     As a child   • NEPHRECTOMY Left        SOCIAL HISTORY:   reports that he has been smoking. He has a 10.00 pack-year smoking history. He has never used smokeless tobacco. He reports that he drinks alcohol. His drug history is not on file.    FAMILY HISTORY:  family history includes Alzheimer's disease in his sister; Bone cancer in his mother; Diabetes in his mother, sister, sister, and sister; Heart attack in his father, sister, and another family member; Heart disease in his brother, brother, brother, father, mother, sister, sister, sister, and sister; Hypertension in his brother, brother, brother, father, sister, sister, sister, and sister; Lung cancer in his brother; Stomach cancer in his mother; Stroke in his brother and sister.    ALLERGIES:  Allergies   Allergen Reactions   • Iodine Unknown - High Severity     Unknown reaction       MEDICATIONS:  The medication list has been reviewed with the patient by the medical assistant, and the list has been updated in the electronic medical record, which I reviewed.  Medication dosages and frequencies were confirmed to be accurate.    Review of Systems   Constitutional: Positive for fatigue. Negative for appetite change, chills, fever and unexpected weight change.   HENT: Negative for congestion, dental problem, ear pain, hearing loss, mouth sores, nosebleeds, postnasal drip, rhinorrhea, tinnitus and trouble swallowing.    Eyes: Negative.    Respiratory: Negative for cough, chest  "tightness, shortness of breath, wheezing and stridor.    Cardiovascular: Negative for chest pain, palpitations and leg swelling.   Gastrointestinal: Negative for abdominal distention, abdominal pain, blood in stool, constipation, diarrhea, nausea and vomiting.   Endocrine: Negative.    Genitourinary: Negative for decreased urine volume, difficulty urinating, dysuria, flank pain, frequency, hematuria, scrotal swelling, testicular pain and urgency.   Musculoskeletal: Negative for arthralgias, back pain and joint swelling.   Allergic/Immunologic: Negative.    Neurological: Negative for dizziness, seizures, syncope, weakness, light-headedness, numbness and headaches.   Hematological: Negative for adenopathy. Does not bruise/bleed easily.   Psychiatric/Behavioral: Negative for confusion and sleep disturbance. The patient is not nervous/anxious.    All other systems reviewed and are negative.      Vitals:    02/19/20 1253   BP: 155/69   Pulse: 59   Resp: 12   Temp: 98.5 °F (36.9 °C)   TempSrc: Oral   SpO2: 98%   Weight: 85.2 kg (187 lb 12.8 oz)   Height: 166 cm (65.35\")  Comment: new height   PainSc: 0-No pain       Physical Exam   Constitutional: He is oriented to person, place, and time. He appears well-developed and well-nourished. No distress.   HENT:   Head: Normocephalic and atraumatic. Hair is normal.   Mouth/Throat: Oropharynx is clear and moist.   Eyes: Conjunctivae, EOM and lids are normal. Pupils are equal.   Neck: Neck supple. No JVD present. No thyroid mass and no thyromegaly present.   Cardiovascular: Normal rate and regular rhythm. Exam reveals no gallop and no friction rub.   No murmur heard.  Pulmonary/Chest: Effort normal and breath sounds normal. No respiratory distress. He has no wheezes. He has no rales.   Abdominal: Soft. He exhibits no distension and no mass. There is no splenomegaly or hepatomegaly. There is no tenderness. There is no guarding.   Musculoskeletal: Normal range of motion. He exhibits " no edema, tenderness or deformity.   Lymphadenopathy:     He has no cervical adenopathy.     He has no axillary adenopathy.        Right: No inguinal and no supraclavicular adenopathy present.        Left: No inguinal and no supraclavicular adenopathy present.   Neurological: He is alert and oriented to person, place, and time. He has normal strength.   Skin: Skin is warm and dry. No rash noted.   Psychiatric: He has a normal mood and affect. His behavior is normal. Judgment and thought content normal. Cognition and memory are normal.   Nursing note and vitals reviewed.      DIAGNOSTIC DATA:  Results for orders placed or performed in visit on 02/19/20   CBC Auto Differential   Result Value Ref Range    WBC 8.49 3.40 - 10.80 10*3/mm3    RBC 4.34 4.14 - 5.80 10*6/mm3    Hemoglobin 13.4 13.0 - 17.7 g/dL    Hematocrit 39.8 37.5 - 51.0 %    MCV 91.7 79.0 - 97.0 fL    MCH 30.9 26.6 - 33.0 pg    MCHC 33.7 31.5 - 35.7 g/dL    RDW 13.6 12.3 - 15.4 %    RDW-SD 46.2 37.0 - 54.0 fl    MPV 10.1 6.0 - 12.0 fL    Platelets 312 140 - 450 10*3/mm3    Neutrophil % 57.3 42.7 - 76.0 %    Lymphocyte % 28.3 19.6 - 45.3 %    Monocyte % 6.7 5.0 - 12.0 %    Eosinophil % 5.8 0.3 - 6.2 %    Basophil % 0.8 0.0 - 1.5 %    Immature Grans % 1.1 (H) 0.0 - 0.5 %    Neutrophils, Absolute 4.87 1.70 - 7.00 10*3/mm3    Lymphocytes, Absolute 2.40 0.70 - 3.10 10*3/mm3    Monocytes, Absolute 0.57 0.10 - 0.90 10*3/mm3    Eosinophils, Absolute 0.49 (H) 0.00 - 0.40 10*3/mm3    Basophils, Absolute 0.07 0.00 - 0.20 10*3/mm3    Immature Grans, Absolute 0.09 (H) 0.00 - 0.05 10*3/mm3    nRBC 0.0 0.0 - 0.2 /100 WBC       IMAGING:    I personally reviewed his peripheral blood smear.  Mild anisocytosis is present.  No schistocytes.  Platelets adequate in number normal morphology.  No significant white cell abnormalities noted.    ASSESSMENT:  This is a 64 y.o. male with:  1.  Recent history of normocytic anemia: His hemoglobin was normal back on October 3, 2019.  He  was then a little bit anemic with a hemoglobin in the 11-12 range in December and January.  Prior to this, he did have a cardiac catheterization but he denies any significant bleeding following the procedure.  Its unclear if he might of had acute kidney injury following this procedure that might have led to a decrease in red cell production that has now improved.  He had a normal colonoscopy and a normal EGD.  He states that he is seeing a nephrologist, Dr. Josep Regalado.  Nonetheless, his hemoglobin has normalized today at 13.4.  His white blood cell count and platelet count remain normal.  No think any further evaluation is necessary at this time since he is recently had labs done by his nephrologist and also by Dr. Keating.  Again, it is unclear to me why he became anemic over the past few months, but again, his hemoglobin has now normalized.  2.  Coronary artery disease  3.  Chronic kidney disease    PLAN:   Because his hemoglobin has normalized, I do not think any further evaluation is necessary right now.  We will request records from his nephrologist.  Should he become anemic or have any other issues in future I will certainly be glad to see him back at that time.  Thanks very much for referring him.

## 2020-03-03 RX ORDER — CHLORTHALIDONE 25 MG/1
TABLET ORAL
Qty: 30 TABLET | Refills: 5 | Status: SHIPPED | OUTPATIENT
Start: 2020-03-03 | End: 2020-11-12

## 2020-06-01 ENCOUNTER — TELEPHONE (OUTPATIENT)
Dept: CARDIOLOGY | Facility: CLINIC | Age: 65
End: 2020-06-01

## 2020-06-01 RX ORDER — VALSARTAN 80 MG/1
80 TABLET ORAL DAILY
Qty: 90 TABLET | Refills: 3 | Status: SHIPPED | OUTPATIENT
Start: 2020-06-01 | End: 2020-11-12

## 2020-06-01 NOTE — TELEPHONE ENCOUNTER
Spoke with patient he will be switched from losartan 100 to valsartan 80 mg because losartan is on back order and not available by his [facility] pharmacy.  Once he switches to the new tablet he is to follow his blood pressure let me know if he has any concerns.  He verbalized understanding

## 2020-10-05 RX ORDER — ISOSORBIDE MONONITRATE 30 MG/1
TABLET, EXTENDED RELEASE ORAL
Qty: 90 TABLET | Refills: 3 | Status: SHIPPED | OUTPATIENT
Start: 2020-10-05 | End: 2021-12-06

## 2020-11-09 RX ORDER — LOSARTAN POTASSIUM 100 MG/1
TABLET ORAL
Qty: 90 TABLET | Refills: 3 | OUTPATIENT
Start: 2020-11-09

## 2020-11-12 ENCOUNTER — OFFICE VISIT (OUTPATIENT)
Dept: CARDIOLOGY | Facility: CLINIC | Age: 65
End: 2020-11-12

## 2020-11-12 VITALS
DIASTOLIC BLOOD PRESSURE: 70 MMHG | HEIGHT: 66 IN | BODY MASS INDEX: 29.73 KG/M2 | HEART RATE: 58 BPM | SYSTOLIC BLOOD PRESSURE: 110 MMHG | WEIGHT: 185 LBS

## 2020-11-12 DIAGNOSIS — Z79.4 TYPE 2 DIABETES MELLITUS WITH OTHER CIRCULATORY COMPLICATION, WITH LONG-TERM CURRENT USE OF INSULIN (HCC): ICD-10-CM

## 2020-11-12 DIAGNOSIS — I65.23 CAROTID ARTERY PLAQUE, BILATERAL: ICD-10-CM

## 2020-11-12 DIAGNOSIS — I73.9 PAD (PERIPHERAL ARTERY DISEASE) (HCC): ICD-10-CM

## 2020-11-12 DIAGNOSIS — E78.5 HYPERLIPIDEMIA LDL GOAL <70: ICD-10-CM

## 2020-11-12 DIAGNOSIS — E11.59 TYPE 2 DIABETES MELLITUS WITH OTHER CIRCULATORY COMPLICATION, WITH LONG-TERM CURRENT USE OF INSULIN (HCC): ICD-10-CM

## 2020-11-12 DIAGNOSIS — I25.10 CORONARY ARTERY DISEASE INVOLVING NATIVE CORONARY ARTERY OF NATIVE HEART WITHOUT ANGINA PECTORIS: Primary | ICD-10-CM

## 2020-11-12 PROBLEM — E11.9 DIABETES MELLITUS: Status: ACTIVE | Noted: 2020-11-12

## 2020-11-12 PROCEDURE — 99214 OFFICE O/P EST MOD 30 MIN: CPT | Performed by: NURSE PRACTITIONER

## 2020-11-12 PROCEDURE — 93000 ELECTROCARDIOGRAM COMPLETE: CPT | Performed by: NURSE PRACTITIONER

## 2020-11-12 RX ORDER — LOSARTAN POTASSIUM 100 MG/1
100 TABLET ORAL DAILY
COMMUNITY
Start: 2020-08-11 | End: 2021-01-19

## 2020-11-12 NOTE — PROGRESS NOTES
Date of Office Visit: 20  Encounter Provider: ALAN Apodaca  Place of Service: [facility] CARDIOLOGY  Patient Name: Fernando Gauthier  :1955    Chief Complaint   Patient presents with   • Coronary Artery Disease   :     HPI: Fernando Gauthier is a 65 y.o. male  with history of  hypertension, hyperlipidemia, diabetes mellitus, mitral regurgitation, coronary artery disease status post multiple coronary stent placement, peripheral artery disease, and bilateral carotid artery plaque.  He is a patient of Dr. Osullivan.  I am seeing him in follow up today and have reviewed his medical record.     He is had severe disease of the proximal, mid and the distal LAD as well as disease of the marginal branch.  He had angioplasty to multiple areas of the LAD back in 2001 and then recurrent chest discomfort 2004.  Repeat cardiac catheterization showed all stents in the LAD were patent but 70% stenosis of the obtuse marginal.  He had angioplasty and stent placed to the marginal.  Then 2008 had recurrent cardiac symptoms perfusion stress test was unremarkable proceeded with cardiac catheterization which showed severe stenosis of the proximal left anterior descending, widely patent mid distal and apical stents with borderline disease of the OM1 with 80% in the mid circumflex, right coronary artery was free of disease and he had angioplasty stent placement of the circumflex as well as to the proximal LAD.  Then in  he presented with progressive dyspnea and had a repeat cardiac catheterization showed normal left ventricular systolic function with total occlusion of the right coronary artery not amenable to angioplasty of the vessels were free of obstructive disease and since the right coronary artery was nondominant he was treated medically.  Follow-up Echocardiogram in  showed normal left ventricular systolic function, moderately dilated left atrium no significant  valvular disease.  He had a perfusion stress test at that time which showed an ejection fraction 43% with no ischemia.     He was established in this office in September 2018.  He had a carotid duplex September 2018 which showed bilateral plaque no stenosis.  He also had an echocardiogram which showed normal left ventricular systolic function with an EF of 59%, grade 1 diastolic dysfunction with mild mitral regurgitation.     On 10/3/2019 complaining of intermittent chest tightness x2 weeks associated with nausea diaphoresis and shortness of breath.  He had cardiac catheterization completed 10/7/2019 which showed a very short and normal left main, 20-30% LAD disease with a proximal stent that was widely patent mid LAD stent which was widely patent and another mid to distal LAD stent which was widely patent.  There is a 30% lesion.  Apical LAD stent was widely patent.  There was a 40% area but then a very apical LAD stent with 20% diffuse restenosis.  No ramus intermedius.  The circumflex had a stent in the proximal portion which was widely patent.  There was a large stent in the midportion with 50% restenosis and a very small 1 to with 40-50% disease too small to intervene.  There was an FFR and RFR on the mid circumflex stent which was not hemodynamically significant.  The right coronary artery was small nondominant with 100% occluded right coronary artery with collateral flow.  Imdur was added to his regimen.    We visit today in follow-up.  He is walking 3-4 times a week for 15-20 minutes.  He denies exertional symptoms with that.  He denies chest pain tightness pressure, palpitation, dizziness, lightheadedness, near-syncope, syncope, fatigue, orthopnea or PND.  He denies bleeding issues and is tolerating aspirin and Plavix.  Unfortunately, he continues to smoke 1/4 pack of cigarettes daily.  He reports that his legs get tired but denies actual leg pain.  His knees also get weak with walking.  He also does yard  "work.  He is not taking any sublingual nitroglycerin.          Allergies   Allergen Reactions   • Iodine Unknown - High Severity     Unknown reaction       Past Medical History:   Diagnosis Date   • Anemia    • Coronary artery disease    • Depression    • Diabetes mellitus (CMS/HCC)    • GERD (gastroesophageal reflux disease)    • Gout    • Hiatal hernia    • History of hemorrhoids    • History of kidney cancer    • Hyperlipidemia    • Hypertension    • Pancreatitis    • Ulcer of gastric fundus        Past Surgical History:   Procedure Laterality Date   • AVULSION TOENAIL PLATE     • CARDIAC CATHETERIZATION     • CARDIAC CATHETERIZATION N/A 10/7/2019    Procedure: Left Heart Cath;  Surgeon: Fernando Montalvo MD;  Location: [facility] CATH INVASIVE LOCATION;  Service: Cardiovascular   • CARDIAC CATHETERIZATION  10/7/2019    Procedure: Functional Flow [place];  Surgeon: Fernando Montalvo MD;  Location: [facility] CATH INVASIVE LOCATION;  Service: Cardiovascular   • CARDIAC CATHETERIZATION N/A 10/7/2019    Procedure: Coronary angiography;  Surgeon: Fernando Montalvo MD;  Location: [facility] CATH INVASIVE LOCATION;  Service: Cardiovascular   • CARDIAC CATHETERIZATION N/A 10/7/2019    Procedure: Left ventriculography;  Surgeon: Fernando Montalvo MD;  Location: [facility] CATH INVASIVE LOCATION;  Service: Cardiovascular   • CHOLECYSTECTOMY  2011   • COLONOSCOPY  2010   • CORONARY ANGIOPLASTY  2008   • CORONARY STENT PLACEMENT     • ENDOSCOPY  2010   • EYE SURGERY Left     As a child   • NEPHRECTOMY Left          Family and social history reviewed.     ROS  All other systems were reviewed and are negative          Objective:     Vitals:    11/12/20 1020   BP: 110/70   Pulse: 58   Weight: 83.9 kg (185 lb)   Height: 167.6 cm (66\")     Body mass index is 29.86 kg/m².    PHYSICAL EXAM:  Constitutional:       General: Not in acute distress.     Appearance: Well-developed. Not diaphoretic.   Eyes:      Conjunctiva/sclera: Conjunctivae normal. "   HENT:      Head: Normocephalic.   Neck:      Musculoskeletal: Normal range of motion.      Vascular: No JVD.   Pulmonary:      Effort: Pulmonary effort is normal. No respiratory distress.      Breath sounds: Normal breath sounds. No wheezing. No rhonchi. No rales.   Chest:      Chest wall: Not tender to palpatation.   Cardiovascular:      Normal rate. Regular rhythm.   Abdominal:      General: Bowel sounds are normal. There is no distension.      Palpations: Abdomen is soft.   Musculoskeletal: Normal range of motion.   Skin:     General: Skin is warm and dry. There is no cyanosis.      Findings: No rash.   Neurological:      Mental Status: Alert and oriented to person, place, and time.   Psychiatric:         Behavior: Behavior normal.         Thought Content: Thought content normal.         Judgment: Judgment normal.           ECG 12 Lead    Date/Time: 11/12/2020 10:26 AM  Performed by: Melony Singh APRN  Authorized by: Melony Singh APRN   Comparison: compared with previous ECG   Similar to previous ECG  Rhythm: sinus rhythm  Rate: normal  Conduction: 1st degree AV block    Clinical impression: abnormal EKG            Current Outpatient Medications   Medication Sig Dispense Refill   • allopurinol (ZYLOPRIM) 300 MG tablet allopurinol 300 mg tablet daily     • amLODIPine (NORVASC) 10 MG tablet amlodipine 10 mg tablet daily     • aspirin 325 MG tablet Take 325 mg by mouth Daily.     • bisoprolol (ZEBeta) 10 MG tablet bisoprolol fumarate 10 mg tablet daily     • citalopram (CeleXA) 40 MG tablet citalopram 40 mg tablet   Take 1 tablet every day by oral route.     • clopidogrel (PLAVIX) 75 MG tablet clopidogrel 75 mg tablet   Take 1 tablet every day by oral route.     • fenofibrate 160 MG tablet Take 160 mg by mouth Daily.     • folic acid (FOLVITE) 1 MG tablet folic acid 1 mg tablet   TAKE ONE TABLET BY MOUTH ONCE DAILY     • glimepiride (AMARYL) 4 MG tablet Take 4 mg by mouth Every Morning Before Breakfast.     •  insulin NPH (HUMULIN N) 100 UNIT/ML injection Inject 100 Units under the skin into the appropriate area as directed. 30 units in the am  20 units in the pm     • Insulin NPH Human, Isophane, (NOVOLIN N SC) Inject 30 Units under the skin into the appropriate area as directed Daily.     • isosorbide mononitrate (IMDUR) 30 MG 24 hr tablet TAKE 1 TABLET BY MOUTH EVERY DAY IN THE MORNING 90 tablet 3   • losartan (COZAAR) 100 MG tablet Take 100 mg by mouth Daily.     • metFORMIN ER (GLUCOPHAGE-XR) 500 MG 24 hr tablet Take 1 tablet by mouth Daily. (Patient taking differently: Take 2,000 mg by mouth Daily.)     • nitroglycerin (NITROSTAT) 0.4 MG SL tablet Place 1 tablet under the tongue Every 5 (Five) Minutes As Needed for Chest Pain. Take no more than 3 doses in 15 minutes. 30 tablet 1   • Omega-3 Fatty Acids (OMEGA 3 500 PO) Take  by mouth. 8 Tablets daily     • pioglitazone (ACTOS) 45 MG tablet Take 45 mg by mouth Daily.     • sildenafil (REVATIO) 20 MG tablet Take 20 mg by mouth As Needed.     • simvastatin (ZOCOR) 80 MG tablet simvastatin 80 mg tablet   TAKE ONE/HALF TABLET BY MOUTH ONCE DAILY       No current facility-administered medications for this visit.      Assessment:       Diagnosis Plan   1. Coronary artery disease involving native coronary artery of native heart without angina pectoris  ECG 12 Lead   2. Type 2 diabetes mellitus with other circulatory complication, with long-term current use of insulin (CMS/[facility])     3. Hyperlipidemia LDL goal <70     4. Carotid artery plaque, bilateral  Duplex Carotid Ultrasound CAR   5. PAD (peripheral artery disease) (CMS/[facility])  Doppler Arterial Multi Level Lower Extremity - Bilateral CAR        Orders Placed This Encounter   Procedures   • ECG 12 Lead     This order was created via procedure documentation         Plan:     1. 65 year old gentleman history of severe coronary artery disease.  Multiple interventions including angioplasty, bare metal stent placements, and drug  eluding stent placements to the left anterior descending and circumflex.  Last cardiac catheterization in 2010 showed normal left ventricular systolic function in significant disease left anterior descending and circumflex with no evidence of restenosis but total occlusion of the nondominant right coronary artery.  Echocardiogram in 2012 normal LV systolic function.  Then recurrent symptoms repeat catheterization October 2019 all stents widely patent.  Insignificant FFR of mid circumflex stent.  Right coronary artery 100% occluded proximally with collateral flow now on Imdur for medical management  No angina continue the same  2.  Mitral insufficiency last echocardiogram September 2018 mild  3.  Hypertension blood pressure is well controlled today  4.  Mitral regurgitation-mild on echo September 2018  5.  Bilateral carotid plaque no stenosis on duplex September 2018  6.  Hyperlipidemia on target dose simvastatin  last LDL 70 or less  7.  Diabetes mellitus on insulin If needed would consider starting medication to reduce Cardiovascular events such as SGLT2 inhibitors (Jardiance, Farxiga or Invokana ) or GLP-1 receptor agonists such as Victoza.  8.  History of renal cancer s/p partial left nephrectomy-follows with urology with Dr. Regalado  9. Tobacco use-Unfortunately, he continues to smoke 1/4 pack of cigarettes daily.  He does not have desire to quit but I advised that he does  10. PVD- plan for arterial study in 6 months  11.  Erectile dysfunction.  He was cautioned on use of sublingual nitroglycerin and sildenafil.  He does take Imdur daily and states when he takes sildenafil he does not have dizziness lightheadedness or chest pain.  He has not used sublingual nitroglycerin in a long time reportedly.  He understands that he should not take sublingual nitroglycerin with sildenafil within 24-48 hours.  Vascular testing in 6 months see me in 1 year call with questions or concerns.          It has been a pleasure to  participate in this patient's care.      Thank you,  ALAN Apodaca      **I used Dragon to dictate this note:**

## 2021-01-19 RX ORDER — LOSARTAN POTASSIUM 100 MG/1
TABLET ORAL
Qty: 90 TABLET | Refills: 3 | Status: SHIPPED | OUTPATIENT
Start: 2021-01-19 | End: 2022-06-20

## 2021-05-13 ENCOUNTER — TELEPHONE (OUTPATIENT)
Dept: CARDIOLOGY | Facility: CLINIC | Age: 66
End: 2021-05-13

## 2021-05-13 ENCOUNTER — HOSPITAL ENCOUNTER (OUTPATIENT)
Dept: CARDIOLOGY | Facility: HOSPITAL | Age: 66
Discharge: HOME | End: 2021-05-13

## 2021-05-13 DIAGNOSIS — R68.89 ABNORMAL ANKLE BRACHIAL INDEX (ABI): ICD-10-CM

## 2021-05-13 DIAGNOSIS — I73.9 PAD (PERIPHERAL ARTERY DISEASE) (HCC): ICD-10-CM

## 2021-05-13 DIAGNOSIS — I73.9 PVD (PERIPHERAL VASCULAR DISEASE) WITH CLAUDICATION (HCC): Primary | ICD-10-CM

## 2021-05-13 DIAGNOSIS — I65.23 CAROTID ARTERY PLAQUE, BILATERAL: ICD-10-CM

## 2021-05-13 LAB
BH CV LOWER ARTERIAL LEFT ABI RATIO: 1.07
BH CV LOWER ARTERIAL LEFT CALF RATIO: 1.05
BH CV LOWER ARTERIAL LEFT GREAT TOE SYS MAX: 141 MMHG
BH CV LOWER ARTERIAL LEFT HIGH THIGH SYS MAX: 161 MMHG
BH CV LOWER ARTERIAL LEFT POPLITEAL SYS MAX: 167 MMHG
BH CV LOWER ARTERIAL LEFT POST EX ABI RATIO: 0.99
BH CV LOWER ARTERIAL LEFT POST TIBIAL SYS MAX: 170 MMHG
BH CV LOWER ARTERIAL LEFT TBI RATIO: 0.89
BH CV LOWER ARTERIAL RIGHT ABI RATIO: 1.01
BH CV LOWER ARTERIAL RIGHT CALF RATIO: 0.97
BH CV LOWER ARTERIAL RIGHT GREAT TOE SYS MAX: 106 MMHG
BH CV LOWER ARTERIAL RIGHT HIGH THIGH SYS MAX: 162 MMHG
BH CV LOWER ARTERIAL RIGHT POPLITEAL SYS MAX: 155 MMHG
BH CV LOWER ARTERIAL RIGHT POST EX ABI RATIO: 0.84
BH CV LOWER ARTERIAL RIGHT POST TIBIAL SYS MAX: 161 MMHG
BH CV LOWER ARTERIAL RIGHT TBI RATIO: 0.67
BH CV XLRA MEAS LEFT DIST CCA EDV: -24.7 CM/SEC
BH CV XLRA MEAS LEFT DIST CCA PSV: -75.5 CM/SEC
BH CV XLRA MEAS LEFT DIST ICA EDV: -14.9 CM/SEC
BH CV XLRA MEAS LEFT DIST ICA PSV: -47.8 CM/SEC
BH CV XLRA MEAS LEFT ICA/CCA RATIO: 1.1
BH CV XLRA MEAS LEFT MID CCA EDV: -22 CM/SEC
BH CV XLRA MEAS LEFT MID CCA PSV: -93.3 CM/SEC
BH CV XLRA MEAS LEFT MID ICA EDV: -30.2 CM/SEC
BH CV XLRA MEAS LEFT MID ICA PSV: -76.8 CM/SEC
BH CV XLRA MEAS LEFT PROX CCA EDV: 22 CM/SEC
BH CV XLRA MEAS LEFT PROX CCA PSV: 94.7 CM/SEC
BH CV XLRA MEAS LEFT PROX ECA PSV: -164 CM/SEC
BH CV XLRA MEAS LEFT PROX ICA EDV: -27.7 CM/SEC
BH CV XLRA MEAS LEFT PROX ICA PSV: -82.2 CM/SEC
BH CV XLRA MEAS LEFT PROX SCLA PSV: 161 CM/SEC
BH CV XLRA MEAS LEFT VERTEBRAL A PSV: -49.4 CM/SEC
BH CV XLRA MEAS RIGHT DIST CCA EDV: 14.9 CM/SEC
BH CV XLRA MEAS RIGHT DIST CCA PSV: 50.3 CM/SEC
BH CV XLRA MEAS RIGHT DIST ICA EDV: -18 CM/SEC
BH CV XLRA MEAS RIGHT DIST ICA PSV: -63.4 CM/SEC
BH CV XLRA MEAS RIGHT ICA/CCA RATIO: 1.5
BH CV XLRA MEAS RIGHT MID CCA EDV: 13 CM/SEC
BH CV XLRA MEAS RIGHT MID CCA PSV: 77 CM/SEC
BH CV XLRA MEAS RIGHT MID ICA EDV: -21.7 CM/SEC
BH CV XLRA MEAS RIGHT MID ICA PSV: -68.3 CM/SEC
BH CV XLRA MEAS RIGHT PROX CCA EDV: 13 CM/SEC
BH CV XLRA MEAS RIGHT PROX CCA PSV: 77 CM/SEC
BH CV XLRA MEAS RIGHT PROX ECA PSV: -114 CM/SEC
BH CV XLRA MEAS RIGHT PROX ICA EDV: -19.9 CM/SEC
BH CV XLRA MEAS RIGHT PROX ICA PSV: -75.2 CM/SEC
BH CV XLRA MEAS RIGHT PROX SCLA PSV: 155 CM/SEC
BH CV XLRA MEAS RIGHT VERTEBRAL A PSV: -39.9 CM/SEC
MAXIMAL PREDICTED HEART RATE: 154 BPM
MAXIMAL PREDICTED HEART RATE: 154 BPM
STRESS TARGET HR: 131 BPM
STRESS TARGET HR: 131 BPM
UPPER ARTERIAL LEFT ARM BRACHIAL SYS MAX: 159 MMHG
UPPER ARTERIAL RIGHT ARM BRACHIAL SYS MAX: 0.99 MMHG

## 2021-05-13 PROCEDURE — 93880 EXTRACRANIAL BILAT STUDY: CPT

## 2021-05-13 PROCEDURE — 93880 EXTRACRANIAL BILAT STUDY: CPT | Performed by: INTERNAL MEDICINE

## 2021-05-13 PROCEDURE — 93924 LWR XTR VASC STDY BILAT: CPT | Performed by: INTERNAL MEDICINE

## 2021-05-13 PROCEDURE — 93924 LWR XTR VASC STDY BILAT: CPT

## 2021-05-13 NOTE — TELEPHONE ENCOUNTER
Called and left voicemail.  Carotid duplex shows mild bilateral plaque but no stenosis.  Lower extremity arterial stress shows exercise-induced claudication on the right lower extremity.  Need to further discuss and would like patient to have CT angiogram of the abdomen with runoff and then send him to vascular for further recommendation. I requested a call back to further discuss and also informed patient I would be out of the office tomorrow but I am happy to discuss Monday if he does not call back today

## 2021-05-17 RX ORDER — PREDNISONE 20 MG/1
TABLET ORAL
Qty: 6 TABLET | Refills: 0 | Status: SHIPPED | OUTPATIENT
Start: 2021-05-17 | End: 2021-11-08

## 2021-05-17 RX ORDER — DIPHENHYDRAMINE HCL 25 MG
TABLET ORAL
Qty: 4 TABLET | Refills: 0 | Status: SHIPPED | OUTPATIENT
Start: 2021-05-17 | End: 2021-11-08

## 2021-06-11 ENCOUNTER — HOSPITAL ENCOUNTER (OUTPATIENT)
Dept: CT IMAGING | Facility: HOSPITAL | Age: 66
Discharge: HOME | End: 2021-06-11
Admitting: NURSE PRACTITIONER

## 2021-06-11 DIAGNOSIS — I73.9 PVD (PERIPHERAL VASCULAR DISEASE) WITH CLAUDICATION (HCC): ICD-10-CM

## 2021-06-11 DIAGNOSIS — R68.89 ABNORMAL ANKLE BRACHIAL INDEX (ABI): ICD-10-CM

## 2021-06-11 LAB — CREAT BLDA-MCNC: 1.6 MG/DL (ref 0.6–1.3)

## 2021-06-11 PROCEDURE — 75635 CT ANGIO ABDOMINAL ARTERIES: CPT

## 2021-06-11 PROCEDURE — 82565 ASSAY OF CREATININE: CPT

## 2021-06-11 PROCEDURE — 25010000002 IOPAMIDOL 61 % SOLUTION: Performed by: NURSE PRACTITIONER

## 2021-06-11 RX ADMIN — IOPAMIDOL 95 ML: 612 INJECTION, SOLUTION INTRAVENOUS at 15:43

## 2021-11-08 ENCOUNTER — OFFICE VISIT (OUTPATIENT)
Dept: CARDIOLOGY | Facility: CLINIC | Age: 66
End: 2021-11-08

## 2021-11-08 VITALS
BODY MASS INDEX: 31.34 KG/M2 | DIASTOLIC BLOOD PRESSURE: 76 MMHG | WEIGHT: 195 LBS | HEIGHT: 66 IN | SYSTOLIC BLOOD PRESSURE: 140 MMHG | HEART RATE: 56 BPM

## 2021-11-08 DIAGNOSIS — I25.10 CORONARY ARTERY DISEASE INVOLVING NATIVE CORONARY ARTERY OF NATIVE HEART WITHOUT ANGINA PECTORIS: Primary | ICD-10-CM

## 2021-11-08 PROCEDURE — 99214 OFFICE O/P EST MOD 30 MIN: CPT | Performed by: NURSE PRACTITIONER

## 2021-11-08 PROCEDURE — 93000 ELECTROCARDIOGRAM COMPLETE: CPT | Performed by: NURSE PRACTITIONER

## 2021-11-08 NOTE — PROGRESS NOTES
Date of Office Visit: 21  Encounter Provider: ALAN Apodaca  Place of Service: [facility] CARDIOLOGY  Patient Name: Fernando Gauthier  :1955    Chief Complaint   Patient presents with   • Coronary artery disease involving native coronary artery of    • Unstable Angina   • Follow-up   :     HPI: Fernando Gauthier is a 66 y.o. male  with hypertension, hyperlipidemia, diabetes mellitus, mitral regurgitation, coronary artery disease status post multiple coronary stent placement, peripheral artery disease, and bilateral carotid artery plaque.  He is a patient of Dr. Osullivan.  I am seeing him in follow up today and have reviewed his medical record.     He was found to have severe disease of the proximal, mid and the distal LAD as well as disease of the marginal branch.  He had angioplasty to multiple areas of the LAD back in 2001 and then recurrent chest discomfort 2004.  He had angioplasty and stent placed to the marginal.  Then 2008 had recurrent cardiac symptoms perfusion stress test was unremarkable proceeded with cardiac catheterization where he had angioplasty stent placement of the circumflex as well as to the proximal LAD.  Then in  he presented with progressive dyspnea and had a repeat cardiac catheterization showed normal left ventricular systolic function with total occlusion of the right coronary artery not amenable to angioplasty of the vessels were free of obstructive disease and since the right coronary artery was nondominant he was treated medically.  Follow-up Echocardiogram in  showed normal left ventricular systolic function, moderately dilated left atrium no significant valvular disease.  He had a perfusion stress test at that time which showed an ejection fraction 43% with no ischemia. In 2018,  he also had an echocardiogram which showed normal left ventricular systolic function with an EF of 59%, grade 1 diastolic  "dysfunction with mild mitral regurgitation.     In 10/3/2019, he complained of intermittent chest tightness associated with nausea diaphoresis and shortness of breath.  He had cardiac catheterization completed 10/7/2019 which showed a very short and normal left main, 20-30% LAD disease with a proximal stent that was widely patent mid LAD stent which was widely patent and another mid to distal LAD stent which was widely patent.  There is a 30% lesion.  Apical LAD stent was widely patent.  There was a 40% area but then a very apical LAD stent with 20% diffuse restenosis.  No ramus intermedius.  The circumflex had a stent in the proximal portion which was widely patent.  There was a large stent in the midportion with 50% restenosis and a very small 1 to with 40-50% disease too small to intervene.  There was an FFR and RFR on the mid circumflex stent which was not hemodynamically significant.  The right coronary artery was small nondominant with 100% occluded right coronary artery with collateral flow.  Imdur was added to his regimen.  He later was found to have exercise-induced claudication particularly in the right lower extremity.  He now presents for annual reassessment.  He continues to complain of some sciatic nerve pain down the left leg.  He stays active but no structured exercise.  He denies chest pain tightness pressure, shortness of breath, palpitations, near-syncope or syncope.  He has some lower extremity edema which is almost daily.  This improves overnight.  This is not progressed.  Overall, he reports doing good.  He mainly has good days every day.              No Known Allergies        Family and social history reviewed.     ROS  All other systems were reviewed and are negative          Objective:     Vitals:    11/08/21 0925   BP: 140/76   BP Location: Left arm   Patient Position: Sitting   Pulse: 56   Weight: 88.5 kg (195 lb)   Height: 167.6 cm (66\")     Body mass index is 31.47 kg/m².    PHYSICAL " EXAM:  Constitutional:       General: Not in acute distress.     Appearance: Well-developed. Not diaphoretic.   HENT:      Head: Normocephalic.   Pulmonary:      Effort: Pulmonary effort is normal. No respiratory distress.      Breath sounds: Normal breath sounds. No wheezing. No rhonchi. No rales.   Cardiovascular:      Normal rate. Regular rhythm.   Pulses:     Radial: 2+ bilaterally.     Posterior tibial: 1+ on the right side.  Skin:     General: Skin is warm and dry. There is no cyanosis.      Findings: No rash.   Neurological:      Mental Status: Alert and oriented to person, place, and time.   Psychiatric:         Behavior: Behavior normal.         Thought Content: Thought content normal.         Judgment: Judgment normal.           ECG 12 Lead    Date/Time: 11/8/2021 9:50 AM  Performed by: Melony Singh APRN  Authorized by: Melony Singh APRN   Comparison: compared with previous ECG   Similar to previous ECG  Rhythm: sinus rhythm  Rate: normal  Conduction: 1st degree AV block  Other findings: non-specific ST-T wave changes              Current Outpatient Medications   Medication Sig Dispense Refill   • allopurinol (ZYLOPRIM) 300 MG tablet allopurinol 300 mg tablet daily     • amLODIPine (NORVASC) 10 MG tablet amlodipine 10 mg tablet daily     • aspirin 325 MG tablet Take 325 mg by mouth Daily.     • bisoprolol (ZEBeta) 10 MG tablet bisoprolol fumarate 10 mg tablet daily     • citalopram (CeleXA) 40 MG tablet citalopram 40 mg tablet   Take 1 tablet every day by oral route.     • clopidogrel (PLAVIX) 75 MG tablet clopidogrel 75 mg tablet   Take 1 tablet every day by oral route.     • folic acid (FOLVITE) 1 MG tablet folic acid 1 mg tablet   TAKE ONE TABLET BY MOUTH ONCE DAILY     • glimepiride (AMARYL) 4 MG tablet Take 4 mg by mouth Every Morning Before Breakfast.     • insulin NPH (HUMULIN N) 100 UNIT/ML injection Inject 100 Units under the skin into the appropriate area as directed. 30 units in the am  20  units in the pm     • Insulin NPH Human, Isophane, (NOVOLIN N SC) Inject 30 Units under the skin into the appropriate area as directed Daily.     • isosorbide mononitrate (IMDUR) 30 MG 24 hr tablet TAKE 1 TABLET BY MOUTH EVERY DAY IN THE MORNING 90 tablet 3   • losartan (COZAAR) 100 MG tablet TAKE 1 TABLET BY MOUTH EVERY DAY 90 tablet 3   • metFORMIN ER (GLUCOPHAGE-XR) 500 MG 24 hr tablet Take 1 tablet by mouth Daily. (Patient taking differently: Take 2,000 mg by mouth Daily.)     • nitroglycerin (NITROSTAT) 0.4 MG SL tablet Place 1 tablet under the tongue Every 5 (Five) Minutes As Needed for Chest Pain. Take no more than 3 doses in 15 minutes. 30 tablet 1   • Omega-3 Fatty Acids (OMEGA 3 500 PO) Take  by mouth. 8 Tablets daily     • pioglitazone (ACTOS) 45 MG tablet Take 45 mg by mouth Daily.     • sildenafil (REVATIO) 20 MG tablet Take 20 mg by mouth As Needed.     • simvastatin (ZOCOR) 80 MG tablet simvastatin 80 mg tablet   TAKE ONE/HALF TABLET BY MOUTH ONCE DAILY     • fenofibrate 160 MG tablet Take 160 mg by mouth Daily.     • predniSONE (DELTASONE) 20 MG tablet Take 3 tablets the evening before and 3 tablets the morning of CT scan for allergy 6 tablet 0     No current facility-administered medications for this visit.     Assessment:       Diagnosis Plan   1. Coronary artery disease involving native coronary artery of native heart without angina pectoris  ECG 12 Lead        Orders Placed This Encounter   Procedures   • ECG 12 Lead     This order was created via procedure documentation     Order Specific Question:   Release to patient     Answer:   Immediate         Plan:         1. 66 year old gentleman history of severe coronary artery disease.  Multiple interventions including angioplasty, bare metal stent placements, and drug eluding stent placements to the left anterior descending and circumflex.  Last cardiac catheterization in 2010 showed normal left ventricular systolic function in significant disease  left anterior descending and circumflex with no evidence of restenosis but total occlusion of the nondominant right coronary artery.  Echocardiogram in 2012 normal LV systolic function.  Then recurrent symptoms repeat catheterization October 2019 all stents widely patent.  Insignificant FFR of mid circumflex stent.  Right coronary artery 100% occluded proximally with collateral flow now on Imdur for medical management  No angina continue the same  2.  Mitral insufficiency last echocardiogram September 2018 mild  3.  Hypertension blood pressure is well controlled today  4.  Mitral regurgitation-mild on echo September 2018  5.  Bilateral carotid plaque no stenosis on duplex May 2021  6.  Hyperlipidemia on target dose simvastatin target LDL 70 or less  7.  Diabetes mellitus on insulin followed by PCP  8.  History of renal cancer s/p partial left nephrectomy-follows with nephrology with Dr. Regalado  9. Tobacco use-Unfortunately, he continues to smoke 1/4 pack of cigarettes daily.  He does not have desire to quit but I advised that he does  10.  Peripheral arterial disease.  He has exercise-induced claudication in the right lower extremity.  Abnormal CTA with runoff June 2021  11.  Erectile dysfunction.  He has been educated on sildenafil with Imdur.  He should not take sublingual nitroglycerin within 24 to 48 hours of sildenafil.  12.  Enlarged mesenteric lymph nodes noted on CT abdomen June 2021.  6-month follow-up was suggested.  I have set a reminder to call him next month to make arrangement for this          It has been a pleasure to participate in this patient's care.      Thank you,  ALAN Apodaca      **I used Dragon to dictate this note:**

## 2021-12-06 RX ORDER — ISOSORBIDE MONONITRATE 30 MG/1
TABLET, EXTENDED RELEASE ORAL
Qty: 90 TABLET | Refills: 3 | Status: ON HOLD | OUTPATIENT
Start: 2021-12-06 | End: 2022-05-15

## 2021-12-13 ENCOUNTER — TELEPHONE (OUTPATIENT)
Dept: CARDIOLOGY | Facility: CLINIC | Age: 66
End: 2021-12-13

## 2021-12-13 DIAGNOSIS — R59.9 LYMPH NODES ENLARGED: Primary | ICD-10-CM

## 2021-12-13 NOTE — TELEPHONE ENCOUNTER
Please inform patient that I have placed an order for follow-up CT of his abdomen to check lymph nodes which were enlarged back in June.

## 2021-12-13 NOTE — TELEPHONE ENCOUNTER
Patient notified and verbalized understanding that someone will be calling him to schedule this.  If he does not hear back from the  in one week he is to call me. / JOSEY

## 2021-12-20 ENCOUNTER — TELEPHONE (OUTPATIENT)
Dept: CARDIOLOGY | Facility: CLINIC | Age: 66
End: 2021-12-20

## 2021-12-20 ENCOUNTER — HOSPITAL ENCOUNTER (OUTPATIENT)
Dept: CT IMAGING | Facility: HOSPITAL | Age: 66
Discharge: HOME | End: 2021-12-20
Admitting: NURSE PRACTITIONER

## 2021-12-20 DIAGNOSIS — R59.9 LYMPH NODES ENLARGED: ICD-10-CM

## 2021-12-20 LAB — CREAT BLDA-MCNC: 1.6 MG/DL (ref 0.6–1.3)

## 2021-12-20 PROCEDURE — 82565 ASSAY OF CREATININE: CPT

## 2021-12-20 PROCEDURE — 25010000002 IOPAMIDOL 61 % SOLUTION: Performed by: NURSE PRACTITIONER

## 2021-12-20 PROCEDURE — 74177 CT ABD & PELVIS W/CONTRAST: CPT

## 2021-12-20 RX ADMIN — IOPAMIDOL 85 ML: 612 INJECTION, SOLUTION INTRAVENOUS at 08:52

## 2021-12-20 NOTE — TELEPHONE ENCOUNTER
Patient returning the call for the results of CT Abdomen and Pelvis.  He can be reached at [phone]./ JOSEY

## 2021-12-20 NOTE — TELEPHONE ENCOUNTER
Attempted to contact patient. Voicemail left requesting a call back for the results. Will continue to try and reach patient.      Kimberley Weaver RN  Triage [facility] [place]

## 2021-12-21 NOTE — TELEPHONE ENCOUNTER
Results and recommendations reviewed with the patient. Patient verbalized understanding. Denied further questions at this time.    Kimberley Weaver RN  [place] [facility] [place]

## 2022-05-15 ENCOUNTER — APPOINTMENT (OUTPATIENT)
Dept: GENERAL RADIOLOGY | Facility: HOSPITAL | Age: 67
End: 2022-05-15

## 2022-05-15 ENCOUNTER — HOSPITAL ENCOUNTER (INPATIENT)
Facility: HOSPITAL | Age: 67
LOS: 2 days | Discharge: HOME | End: 2022-05-17
Attending: EMERGENCY MEDICINE | Admitting: INTERNAL MEDICINE

## 2022-05-15 DIAGNOSIS — N18.9 CHRONIC KIDNEY DISEASE, UNSPECIFIED CKD STAGE: ICD-10-CM

## 2022-05-15 DIAGNOSIS — R20.0 LEFT SIDED NUMBNESS: ICD-10-CM

## 2022-05-15 DIAGNOSIS — E11.65 UNCONTROLLED TYPE 2 DIABETES MELLITUS WITH HYPERGLYCEMIA: ICD-10-CM

## 2022-05-15 DIAGNOSIS — E87.1 HYPONATREMIA: ICD-10-CM

## 2022-05-15 DIAGNOSIS — I21.3 ST ELEVATION MYOCARDIAL INFARCTION (STEMI), UNSPECIFIED ARTERY: Primary | ICD-10-CM

## 2022-05-15 LAB
ACT BLD: 255 SECONDS (ref 82–152)
ACT BLD: 261 SECONDS (ref 82–152)
ACT BLD: 850 SECONDS (ref 82–152)
ALBUMIN SERPL-MCNC: 3.5 G/DL (ref 3.5–5.2)
ALBUMIN/GLOB SERPL: 1.3 G/DL
ALP SERPL-CCNC: 50 U/L (ref 39–117)
ALT SERPL W P-5'-P-CCNC: 28 U/L (ref 1–41)
ANION GAP SERPL CALCULATED.3IONS-SCNC: 12.2 MMOL/L (ref 5–15)
AST SERPL-CCNC: 27 U/L (ref 1–40)
BASOPHILS # BLD AUTO: 0.02 10*3/MM3 (ref 0–0.2)
BASOPHILS NFR BLD AUTO: 0.3 % (ref 0–1.5)
BILIRUB SERPL-MCNC: 0.3 MG/DL (ref 0–1.2)
BUN SERPL-MCNC: 23 MG/DL (ref 8–23)
BUN/CREAT SERPL: 12.2 (ref 7–25)
CALCIUM SPEC-SCNC: 8.6 MG/DL (ref 8.6–10.5)
CHLORIDE SERPL-SCNC: 94 MMOL/L (ref 98–107)
CO2 SERPL-SCNC: 20.8 MMOL/L (ref 22–29)
CREAT SERPL-MCNC: 1.89 MG/DL (ref 0.76–1.27)
DEPRECATED RDW RBC AUTO: 43 FL (ref 37–54)
EGFRCR SERPLBLD CKD-EPI 2021: 38.4 ML/MIN/1.73
EOSINOPHIL # BLD AUTO: 0.15 10*3/MM3 (ref 0–0.4)
EOSINOPHIL NFR BLD AUTO: 2.6 % (ref 0.3–6.2)
ERYTHROCYTE [DISTWIDTH] IN BLOOD BY AUTOMATED COUNT: 12.5 % (ref 12.3–15.4)
GLOBULIN UR ELPH-MCNC: 2.8 GM/DL
GLUCOSE BLDC GLUCOMTR-MCNC: 233 MG/DL (ref 70–130)
GLUCOSE BLDC GLUCOMTR-MCNC: 261 MG/DL (ref 70–130)
GLUCOSE BLDC GLUCOMTR-MCNC: 281 MG/DL (ref 70–130)
GLUCOSE BLDC GLUCOMTR-MCNC: 394 MG/DL (ref 70–130)
GLUCOSE BLDC GLUCOMTR-MCNC: 539 MG/DL (ref 70–130)
GLUCOSE SERPL-MCNC: 516 MG/DL (ref 65–99)
HCT VFR BLD AUTO: 41.4 % (ref 37.5–51)
HGB BLD-MCNC: 13.3 G/DL (ref 13–17.7)
HOLD SPECIMEN: NORMAL
IMM GRANULOCYTES # BLD AUTO: 0.02 10*3/MM3 (ref 0–0.05)
IMM GRANULOCYTES NFR BLD AUTO: 0.3 % (ref 0–0.5)
LYMPHOCYTES # BLD AUTO: 1.22 10*3/MM3 (ref 0.7–3.1)
LYMPHOCYTES NFR BLD AUTO: 20.8 % (ref 19.6–45.3)
MCH RBC QN AUTO: 29.6 PG (ref 26.6–33)
MCHC RBC AUTO-ENTMCNC: 32.1 G/DL (ref 31.5–35.7)
MCV RBC AUTO: 92.2 FL (ref 79–97)
MONOCYTES # BLD AUTO: 0.28 10*3/MM3 (ref 0.1–0.9)
MONOCYTES NFR BLD AUTO: 4.8 % (ref 5–12)
NEUTROPHILS NFR BLD AUTO: 4.17 10*3/MM3 (ref 1.7–7)
NEUTROPHILS NFR BLD AUTO: 71.2 % (ref 42.7–76)
NRBC BLD AUTO-RTO: 0 /100 WBC (ref 0–0.2)
PLATELET # BLD AUTO: 178 10*3/MM3 (ref 140–450)
PMV BLD AUTO: 11.2 FL (ref 6–12)
POTASSIUM SERPL-SCNC: 4.1 MMOL/L (ref 3.5–5.2)
PROT SERPL-MCNC: 6.3 G/DL (ref 6–8.5)
QT INTERVAL: 472 MS
QT INTERVAL: 482 MS
RBC # BLD AUTO: 4.49 10*6/MM3 (ref 4.14–5.8)
SARS-COV-2 RNA RESP QL NAA+PROBE: DETECTED
SODIUM SERPL-SCNC: 127 MMOL/L (ref 136–145)
TROPONIN T SERPL-MCNC: 0.02 NG/ML (ref 0–0.03)
WBC NRBC COR # BLD: 5.86 10*3/MM3 (ref 3.4–10.8)
WHOLE BLOOD HOLD COAG: NORMAL
WHOLE BLOOD HOLD SPECIMEN: NORMAL

## 2022-05-15 PROCEDURE — C1887 CATHETER, GUIDING: HCPCS | Performed by: INTERNAL MEDICINE

## 2022-05-15 PROCEDURE — C1874 STENT, COATED/COV W/DEL SYS: HCPCS | Performed by: INTERNAL MEDICINE

## 2022-05-15 PROCEDURE — 92978 ENDOLUMINL IVUS OCT C 1ST: CPT | Performed by: INTERNAL MEDICINE

## 2022-05-15 PROCEDURE — 80053 COMPREHEN METABOLIC PANEL: CPT | Performed by: PHYSICIAN ASSISTANT

## 2022-05-15 PROCEDURE — 93005 ELECTROCARDIOGRAM TRACING: CPT | Performed by: EMERGENCY MEDICINE

## 2022-05-15 PROCEDURE — C9600 PERC DRUG-EL COR STENT SING: HCPCS | Performed by: INTERNAL MEDICINE

## 2022-05-15 PROCEDURE — 93005 ELECTROCARDIOGRAM TRACING: CPT | Performed by: INTERNAL MEDICINE

## 2022-05-15 PROCEDURE — 63710000001 INSULIN REGULAR HUMAN PER 5 UNITS: Performed by: PHYSICIAN ASSISTANT

## 2022-05-15 PROCEDURE — U0003 INFECTIOUS AGENT DETECTION BY NUCLEIC ACID (DNA OR RNA); SEVERE ACUTE RESPIRATORY SYNDROME CORONAVIRUS 2 (SARS-COV-2) (CORONAVIRUS DISEASE [COVID-19]), AMPLIFIED PROBE TECHNIQUE, MAKING USE OF HIGH THROUGHPUT TECHNOLOGIES AS DESCRIBED BY CMS-2020-01-R: HCPCS | Performed by: PHYSICIAN ASSISTANT

## 2022-05-15 PROCEDURE — 99152 MOD SED SAME PHYS/QHP 5/>YRS: CPT | Performed by: INTERNAL MEDICINE

## 2022-05-15 PROCEDURE — 63710000001 INSULIN ISOPHANE HUMAN PER 5 UNITS: Performed by: INTERNAL MEDICINE

## 2022-05-15 PROCEDURE — 25010000002 HEPARIN (PORCINE) PER 1000 UNITS: Performed by: INTERNAL MEDICINE

## 2022-05-15 PROCEDURE — C1725 CATH, TRANSLUMIN NON-LASER: HCPCS | Performed by: INTERNAL MEDICINE

## 2022-05-15 PROCEDURE — C1753 CATH, INTRAVAS ULTRASOUND: HCPCS | Performed by: INTERNAL MEDICINE

## 2022-05-15 PROCEDURE — 93454 CORONARY ARTERY ANGIO S&I: CPT | Performed by: INTERNAL MEDICINE

## 2022-05-15 PROCEDURE — C1894 INTRO/SHEATH, NON-LASER: HCPCS | Performed by: INTERNAL MEDICINE

## 2022-05-15 PROCEDURE — 85347 COAGULATION TIME ACTIVATED: CPT

## 2022-05-15 PROCEDURE — 82962 GLUCOSE BLOOD TEST: CPT

## 2022-05-15 PROCEDURE — 0 IOPAMIDOL PER 1 ML: Performed by: INTERNAL MEDICINE

## 2022-05-15 PROCEDURE — 25010000002 MIDAZOLAM PER 1 MG: Performed by: INTERNAL MEDICINE

## 2022-05-15 PROCEDURE — 99223 1ST HOSP IP/OBS HIGH 75: CPT | Performed by: INTERNAL MEDICINE

## 2022-05-15 PROCEDURE — 85025 COMPLETE CBC W/AUTO DIFF WBC: CPT | Performed by: PHYSICIAN ASSISTANT

## 2022-05-15 PROCEDURE — 71045 X-RAY EXAM CHEST 1 VIEW: CPT

## 2022-05-15 PROCEDURE — 93010 ELECTROCARDIOGRAM REPORT: CPT | Performed by: INTERNAL MEDICINE

## 2022-05-15 PROCEDURE — C1769 GUIDE WIRE: HCPCS | Performed by: INTERNAL MEDICINE

## 2022-05-15 PROCEDURE — 63710000001 INSULIN REGULAR HUMAN PER 5 UNITS: Performed by: INTERNAL MEDICINE

## 2022-05-15 PROCEDURE — 25010000002 FENTANYL CITRATE (PF) 50 MCG/ML SOLUTION: Performed by: INTERNAL MEDICINE

## 2022-05-15 PROCEDURE — 027035Z DILATION OF CORONARY ARTERY, ONE ARTERY WITH TWO DRUG-ELUTING INTRALUMINAL DEVICES, PERCUTANEOUS APPROACH: ICD-10-PCS | Performed by: INTERNAL MEDICINE

## 2022-05-15 PROCEDURE — 84484 ASSAY OF TROPONIN QUANT: CPT | Performed by: PHYSICIAN ASSISTANT

## 2022-05-15 PROCEDURE — U0005 INFEC AGEN DETEC AMPLI PROBE: HCPCS | Performed by: PHYSICIAN ASSISTANT

## 2022-05-15 PROCEDURE — 92928 PRQ TCAT PLMT NTRAC ST 1 LES: CPT | Performed by: INTERNAL MEDICINE

## 2022-05-15 PROCEDURE — 4A023N7 MEASUREMENT OF CARDIAC SAMPLING AND PRESSURE, LEFT HEART, PERCUTANEOUS APPROACH: ICD-10-PCS | Performed by: INTERNAL MEDICINE

## 2022-05-15 PROCEDURE — B2111ZZ FLUOROSCOPY OF MULTIPLE CORONARY ARTERIES USING LOW OSMOLAR CONTRAST: ICD-10-PCS | Performed by: INTERNAL MEDICINE

## 2022-05-15 PROCEDURE — 99153 MOD SED SAME PHYS/QHP EA: CPT | Performed by: INTERNAL MEDICINE

## 2022-05-15 PROCEDURE — 99284 EMERGENCY DEPT VISIT MOD MDM: CPT

## 2022-05-15 PROCEDURE — B221Z2Z COMPUTERIZED TOMOGRAPHY (CT SCAN) OF MULTIPLE CORONARY ARTERIES USING INTRAVASCULAR OPTICAL COHERENCE: ICD-10-PCS | Performed by: INTERNAL MEDICINE

## 2022-05-15 DEVICE — XIENCE SKYPOINT™ EVEROLIMUS ELUTING CORONARY STENT SYSTEM 2.50 MM X 33 MM / RAPID-EXCHANGE
Type: IMPLANTABLE DEVICE | Status: FUNCTIONAL
Brand: XIENCE SKYPOINT™

## 2022-05-15 DEVICE — XIENCE SKYPOINT™ EVEROLIMUS ELUTING CORONARY STENT SYSTEM 3.50 MM X 38 MM / RAPID-EXCHANGE
Type: IMPLANTABLE DEVICE | Status: FUNCTIONAL
Brand: XIENCE SKYPOINT™

## 2022-05-15 DEVICE — XIENCE SKYPOINT™ EVEROLIMUS ELUTING CORONARY STENT SYSTEM 2.75 MM X 15 MM / RAPID-EXCHANGE
Type: IMPLANTABLE DEVICE | Status: FUNCTIONAL
Brand: XIENCE SKYPOINT™

## 2022-05-15 DEVICE — XIENCE SKYPOINT™ EVEROLIMUS ELUTING CORONARY STENT SYSTEM 4.00 MM X 38 MM / RAPID-EXCHANGE
Type: IMPLANTABLE DEVICE | Status: FUNCTIONAL
Brand: XIENCE SKYPOINT™

## 2022-05-15 DEVICE — XIENCE SKYPOINT™ EVEROLIMUS ELUTING CORONARY STENT SYSTEM 3.00 MM X 23 MM / RAPID-EXCHANGE
Type: IMPLANTABLE DEVICE | Status: FUNCTIONAL
Brand: XIENCE SKYPOINT™

## 2022-05-15 RX ORDER — ASPIRIN 325 MG
325 TABLET ORAL DAILY
Status: DISCONTINUED | OUTPATIENT
Start: 2022-05-15 | End: 2022-05-17 | Stop reason: HOSPADM

## 2022-05-15 RX ORDER — ONDANSETRON 4 MG/1
4 TABLET, FILM COATED ORAL EVERY 6 HOURS PRN
Status: DISCONTINUED | OUTPATIENT
Start: 2022-05-15 | End: 2022-05-17 | Stop reason: HOSPADM

## 2022-05-15 RX ORDER — LOSARTAN POTASSIUM 100 MG/1
100 TABLET ORAL DAILY
Status: DISCONTINUED | OUTPATIENT
Start: 2022-05-15 | End: 2022-05-15

## 2022-05-15 RX ORDER — CLOPIDOGREL BISULFATE 75 MG/1
75 TABLET ORAL DAILY
Status: DISCONTINUED | OUTPATIENT
Start: 2022-05-15 | End: 2022-05-17 | Stop reason: HOSPADM

## 2022-05-15 RX ORDER — ALLOPURINOL 300 MG/1
300 TABLET ORAL DAILY
Status: DISCONTINUED | OUTPATIENT
Start: 2022-05-15 | End: 2022-05-17 | Stop reason: HOSPADM

## 2022-05-15 RX ORDER — NITROGLYCERIN 0.4 MG/1
0.4 TABLET SUBLINGUAL
Status: DISCONTINUED | OUTPATIENT
Start: 2022-05-15 | End: 2022-05-15

## 2022-05-15 RX ORDER — GLIPIZIDE 10 MG/1
10 TABLET ORAL
Status: DISCONTINUED | OUTPATIENT
Start: 2022-05-15 | End: 2022-05-15

## 2022-05-15 RX ORDER — LIDOCAINE HYDROCHLORIDE 20 MG/ML
INJECTION, SOLUTION INFILTRATION; PERINEURAL AS NEEDED
Status: DISCONTINUED | OUTPATIENT
Start: 2022-05-15 | End: 2022-05-15 | Stop reason: HOSPADM

## 2022-05-15 RX ORDER — HYDROCODONE BITARTRATE AND ACETAMINOPHEN 5; 325 MG/1; MG/1
1 TABLET ORAL EVERY 4 HOURS PRN
Status: DISCONTINUED | OUTPATIENT
Start: 2022-05-15 | End: 2022-05-17 | Stop reason: HOSPADM

## 2022-05-15 RX ORDER — MIDAZOLAM HYDROCHLORIDE 1 MG/ML
INJECTION INTRAMUSCULAR; INTRAVENOUS AS NEEDED
Status: DISCONTINUED | OUTPATIENT
Start: 2022-05-15 | End: 2022-05-15 | Stop reason: HOSPADM

## 2022-05-15 RX ORDER — SODIUM CHLORIDE 0.9 % (FLUSH) 0.9 %
10 SYRINGE (ML) INJECTION AS NEEDED
Status: DISCONTINUED | OUTPATIENT
Start: 2022-05-15 | End: 2022-05-17 | Stop reason: HOSPADM

## 2022-05-15 RX ORDER — CITALOPRAM 40 MG/1
40 TABLET ORAL DAILY
Status: DISCONTINUED | OUTPATIENT
Start: 2022-05-15 | End: 2022-05-15

## 2022-05-15 RX ORDER — DEXTROSE MONOHYDRATE 25 G/50ML
25 INJECTION, SOLUTION INTRAVENOUS
Status: DISCONTINUED | OUTPATIENT
Start: 2022-05-15 | End: 2022-05-17 | Stop reason: HOSPADM

## 2022-05-15 RX ORDER — ACETAMINOPHEN 325 MG/1
650 TABLET ORAL EVERY 4 HOURS PRN
Status: DISCONTINUED | OUTPATIENT
Start: 2022-05-15 | End: 2022-05-17 | Stop reason: HOSPADM

## 2022-05-15 RX ORDER — ONDANSETRON 2 MG/ML
4 INJECTION INTRAMUSCULAR; INTRAVENOUS EVERY 6 HOURS PRN
Status: DISCONTINUED | OUTPATIENT
Start: 2022-05-15 | End: 2022-05-17 | Stop reason: HOSPADM

## 2022-05-15 RX ORDER — SODIUM CHLORIDE 9 MG/ML
INJECTION, SOLUTION INTRAVENOUS CONTINUOUS PRN
Status: COMPLETED | OUTPATIENT
Start: 2022-05-15 | End: 2022-05-15

## 2022-05-15 RX ORDER — BISOPROLOL FUMARATE 5 MG/1
5 TABLET, FILM COATED ORAL
Status: DISCONTINUED | OUTPATIENT
Start: 2022-05-15 | End: 2022-05-17 | Stop reason: HOSPADM

## 2022-05-15 RX ORDER — CLOPIDOGREL BISULFATE 75 MG/1
TABLET ORAL AS NEEDED
Status: DISCONTINUED | OUTPATIENT
Start: 2022-05-15 | End: 2022-05-15 | Stop reason: HOSPADM

## 2022-05-15 RX ORDER — NICOTINE POLACRILEX 4 MG
15 LOZENGE BUCCAL
Status: DISCONTINUED | OUTPATIENT
Start: 2022-05-15 | End: 2022-05-17 | Stop reason: HOSPADM

## 2022-05-15 RX ORDER — ISOSORBIDE MONONITRATE 30 MG/1
30 TABLET, EXTENDED RELEASE ORAL EVERY MORNING
Status: DISCONTINUED | OUTPATIENT
Start: 2022-05-15 | End: 2022-05-17 | Stop reason: HOSPADM

## 2022-05-15 RX ORDER — AMLODIPINE BESYLATE 10 MG/1
10 TABLET ORAL
Status: DISCONTINUED | OUTPATIENT
Start: 2022-05-15 | End: 2022-05-15

## 2022-05-15 RX ORDER — HEPARIN SODIUM 1000 [USP'U]/ML
INJECTION, SOLUTION INTRAVENOUS; SUBCUTANEOUS AS NEEDED
Status: DISCONTINUED | OUTPATIENT
Start: 2022-05-15 | End: 2022-05-15 | Stop reason: HOSPADM

## 2022-05-15 RX ORDER — ATORVASTATIN CALCIUM 20 MG/1
40 TABLET, FILM COATED ORAL NIGHTLY
Status: DISCONTINUED | OUTPATIENT
Start: 2022-05-15 | End: 2022-05-17 | Stop reason: HOSPADM

## 2022-05-15 RX ORDER — FENTANYL CITRATE 50 UG/ML
INJECTION, SOLUTION INTRAMUSCULAR; INTRAVENOUS AS NEEDED
Status: DISCONTINUED | OUTPATIENT
Start: 2022-05-15 | End: 2022-05-15 | Stop reason: HOSPADM

## 2022-05-15 RX ORDER — SODIUM CHLORIDE 9 MG/ML
50 INJECTION, SOLUTION INTRAVENOUS CONTINUOUS
Status: ACTIVE | OUTPATIENT
Start: 2022-05-15 | End: 2022-05-15

## 2022-05-15 RX ADMIN — INSULIN HUMAN 8 UNITS: 100 INJECTION, SOLUTION PARENTERAL at 11:11

## 2022-05-15 RX ADMIN — ATORVASTATIN CALCIUM 40 MG: 20 TABLET, FILM COATED ORAL at 20:40

## 2022-05-15 RX ADMIN — NITROGLYCERIN 1 INCH: 20 OINTMENT TOPICAL at 10:42

## 2022-05-15 RX ADMIN — INSULIN HUMAN 30 UNITS: 100 INJECTION, SUSPENSION SUBCUTANEOUS at 18:08

## 2022-05-15 RX ADMIN — ISOSORBIDE MONONITRATE 30 MG: 30 TABLET ORAL at 18:08

## 2022-05-15 RX ADMIN — GLIPIZIDE 10 MG: 10 TABLET ORAL at 18:08

## 2022-05-15 RX ADMIN — ALLOPURINOL 300 MG: 300 TABLET ORAL at 18:08

## 2022-05-15 RX ADMIN — Medication 10 ML: at 10:32

## 2022-05-15 RX ADMIN — INSULIN HUMAN 10 UNITS: 100 INJECTION, SOLUTION PARENTERAL at 12:06

## 2022-05-15 RX ADMIN — BISOPROLOL FUMARATE 5 MG: 5 TABLET, FILM COATED ORAL at 21:23

## 2022-05-15 RX ADMIN — PIOGLITAZONE 45 MG: 30 TABLET ORAL at 18:08

## 2022-05-15 NOTE — PLAN OF CARE
Goal Outcome Evaluation:  Plan of Care Reviewed With: patient     Outcome Evaluation: S/p LHC with intervention. Right radial site c/d/s. SR on tele, all other VSS. Ambulating SBA. No c/o pain. Home meds continued. [facility] consulted for diabetes management, pending. Will continue with current POC and update as needed.

## 2022-05-15 NOTE — ED NOTES
Patient picked up from a grocery store in [facility]; call was for chest pain that started approximately 0800AM this morning. Patient had taken 2 nitro prior to EMS arrival. Patient then administered 324 ASA and one nitro and 4mg of zofran. Patient is currently pain free.

## 2022-05-15 NOTE — NURSING NOTE
Patient's daughter expressed concerns about patient's initial presentation of numbness and tingling in the left arm. Patient is not experiencing those symptoms currently and neuro exam is WNL. She states that d/t his history of TIA she is worried about him having a similar episode. Attempted to reach out to the rounding RN for [facility] as they are the attending service, but the call was unanswered. Will attempt to reach out again to provider.

## 2022-05-15 NOTE — ED PROVIDER NOTES
" EMERGENCY DEPARTMENT ENCOUNTER    Room Number:  Pool/CATH  Date seen:  5/15/2022  Time seen: 10:28 EDT  PCP: Jeff Keating MD  Historian: patient, EMS      HPI:  Chief Complaint: chest pain, left sided numbness    A complete HPI/ROS/PMH/PSH/SH/FH are unobtainable due to: none    Context: Fernando Gauthier is a 66 y.o. male with a history of CAD and insulin-dependent diabetes hypertension hyperlipidemia who presents to the ED for evaluation of left pain and left-sided numbness that began acutely at 8 AM.  [place] had sudden onset left-sided numbness and tingling, felt he was stumbling around and and grandson who is a  was with him and reported he was \"at a loss for words.\"  He then developed 9 out of 10 chest pressure that did not radiate, felt hot.  He took 2 nitroglycerin which he states did not seem to help very much and they called EMS.  He received a third nitro and four 81 mg aspirin en route.  He reports his pain is currently 2 out of 10.  The numbness tingling and loss of words symptoms have resolved.  He denies any dyspnea.  EMS also reports the patient's Accu-Chek was greater than 500 and initial systolic blood pressure was 80 after he had taken the initial 2 nitro, they gave him a liter of IV fluids with improvement into the 120 systolic.  EMS was going to take the patient to [facility] due to its proximity to his pickup location however the patient refused and insisted on coming to [facility]. He states he did not take his insulin this morning.        PAST MEDICAL HISTORY  Active Ambulatory Problems     Diagnosis Date Noted   • Unstable angina (HCC) 10/03/2019   • Normocytic anemia 02/19/2020   • Diabetes mellitus (HCC) 11/12/2020     Resolved Ambulatory Problems     Diagnosis Date Noted   • No Resolved Ambulatory Problems     Past Medical History:   Diagnosis Date   • Anemia    • Coronary artery disease    • Depression    • GERD " (gastroesophageal reflux disease)    • Gout    • Hiatal hernia    • History of hemorrhoids    • History of kidney cancer    • Hyperlipidemia    • Hypertension    • Pancreatitis    • Ulcer of gastric fundus          PAST SURGICAL HISTORY  Past Surgical History:   Procedure Laterality Date   • AVULSION TOENAIL PLATE     • CARDIAC CATHETERIZATION     • CARDIAC CATHETERIZATION N/A 10/7/2019    Procedure: Left Heart Cath;  Surgeon: Fernando Montalvo MD;  Location:  [facility] CATH [facility]VAS[facility];  Service: Cardiovascular   • CARDIAC CATHETERIZATION  10/7/2019    Procedure: Functional Flow [facility];  Surgeon: Fernando Montalvo MD;  Location:  [facility] CATH [facility]VAS[facility];  Service: Cardiovascular   • CARDIAC CATHETERIZATION N/A 10/7/2019    Procedure: Coronary angiography;  Surgeon: Fernando Montalvo MD;  Location:  [facility] CATH [facility]VAS[facility];  Service: Cardiovascular   • CARDIAC CATHETERIZATION N/A 10/7/2019    Procedure: Left ventriculography;  Surgeon: Fernando Montalvo MD;  Location:  [facility] CATH [facility]VAS[facility];  Service: Cardiovascular   • CHOLECYSTECTOMY  2011   • COLONOSCOPY  2010   • CORONARY ANGIOPLASTY  2008   • CORONARY STENT PLACEMENT     • ENDOSCOPY  2010   • EYE SURGERY Left     As a child   • NEPHRECTOMY Left          FAMILY HISTORY  Family History   Problem Relation Age of Onset   • Heart disease Mother    • Diabetes Mother    • Stomach cancer Mother    • Bone cancer Mother    • Heart disease Father    • Heart attack Father    • Hypertension Father    • Heart disease Sister    • Stroke Sister    • Diabetes Sister    • Hypertension Sister    • Heart disease Brother    • Stroke Brother    • Hypertension Brother    • Heart disease Sister    • Diabetes Sister    • Hypertension Sister    • Heart disease Sister    • Hypertension Sister    • Heart attack Sister    • Diabetes Sister    • Heart disease Sister    • Hypertension Sister    • Alzheimer's disease Sister    • Heart disease Brother    • Hypertension Brother     • Lung cancer Brother    • Heart disease Brother    • Hypertension Brother    • Heart attack Other          SOCIAL HISTORY  Social History     Socioeconomic History   • Marital status:    • Number of children: 1   • Years of education: High school   Tobacco Use   • Smoking status: Light Tobacco Smoker     Packs/day: 0.25     Years: 40.00     Pack years: 10.00     Types: Cigarettes   • Smokeless tobacco: Never Used   • Tobacco comment: caffeine use   Substance and Sexual Activity   • Alcohol use: Yes     Comment: rare   • Drug use: Never         ALLERGIES  Patient has no known allergies.        REVIEW OF SYSTEMS  Review of Systems     All systems reviewed and negative except for those discussed in HPI.       PHYSICAL EXAM  ED Triage Vitals [05/15/22 1011]   Temp Heart Rate Resp BP SpO2   97.6 °F (36.4 °C) 62 16 125/50 99 %      Temp src Heart Rate Source Patient Position BP Location FiO2 (%)   -- -- -- -- --         GENERAL: not distressed  HENT: atraumatic  EYES: no scleral icterus  CV: regular rhythm, regular rate  RESPIRATORY: normal effort  ABDOMEN: soft, nontender  MUSCULOSKELETAL: no deformity  NEURO: alert, moves all extremities, follows commands  SKIN: warm, dry    Vital signs and nursing notes reviewed.          LAB RESULTS  Recent Results (from the past 24 hour(s))   ECG 12 Lead    Collection Time: 05/15/22 10:20 AM   Result Value Ref Range    QT Interval 482 ms   Comprehensive Metabolic Panel    Collection Time: 05/15/22 10:30 AM    Specimen: Blood   Result Value Ref Range    Glucose 516 (C) 65 - 99 mg/dL    BUN 23 8 - 23 mg/dL    Creatinine 1.89 (H) 0.76 - 1.27 mg/dL    Sodium 127 (L) 136 - 145 mmol/L    Potassium 4.1 3.5 - 5.2 mmol/L    Chloride 94 (L) 98 - 107 mmol/L    CO2 20.8 (L) 22.0 - 29.0 mmol/L    Calcium 8.6 8.6 - 10.5 mg/dL    Total Protein 6.3 6.0 - 8.5 g/dL    Albumin 3.50 3.50 - 5.20 g/dL    ALT (SGPT) 28 1 - 41 U/L    AST (SGOT) 27 1 - 40 U/L    Alkaline Phosphatase 50 39 - 117 U/L     Total Bilirubin 0.3 0.0 - 1.2 mg/dL    Globulin 2.8 gm/dL    A/G Ratio 1.3 g/dL    BUN/Creatinine Ratio 12.2 7.0 - 25.0    Anion Gap 12.2 5.0 - 15.0 mmol/L    eGFR 38.4 (L) >60.0 mL/min/1.73   Troponin    Collection Time: 05/15/22 10:30 AM    Specimen: Blood   Result Value Ref Range    Troponin T 0.024 0.000 - 0.030 ng/mL   CBC Auto Differential    Collection Time: 05/15/22 10:30 AM    Specimen: Blood   Result Value Ref Range    WBC 5.86 3.40 - 10.80 10*3/mm3    RBC 4.49 4.14 - 5.80 10*6/mm3    Hemoglobin 13.3 13.0 - 17.7 g/dL    Hematocrit 41.4 37.5 - 51.0 %    MCV 92.2 79.0 - 97.0 fL    MCH 29.6 26.6 - 33.0 pg    MCHC 32.1 31.5 - 35.7 g/dL    RDW 12.5 12.3 - 15.4 %    RDW-SD 43.0 37.0 - 54.0 fl    MPV 11.2 6.0 - 12.0 fL    Platelets 178 140 - 450 10*3/mm3    Neutrophil % 71.2 42.7 - 76.0 %    Lymphocyte % 20.8 19.6 - 45.3 %    Monocyte % 4.8 (L) 5.0 - 12.0 %    Eosinophil % 2.6 0.3 - 6.2 %    Basophil % 0.3 0.0 - 1.5 %    Immature Grans % 0.3 0.0 - 0.5 %    Neutrophils, Absolute 4.17 1.70 - 7.00 10*3/mm3    Lymphocytes, Absolute 1.22 0.70 - 3.10 10*3/mm3    Monocytes, Absolute 0.28 0.10 - 0.90 10*3/mm3    Eosinophils, Absolute 0.15 0.00 - 0.40 10*3/mm3    Basophils, Absolute 0.02 0.00 - 0.20 10*3/mm3    Immature Grans, Absolute 0.02 0.00 - 0.05 10*3/mm3    nRBC 0.0 0.0 - 0.2 /100 WBC   POC Glucose Once    Collection Time: 05/15/22 10:39 AM    Specimen: Blood   Result Value Ref Range    Glucose 539 (C) 70 - 130 mg/dL       Ordered the above labs and independently reviewed the results.        RADIOLOGY  XR Chest 1 View   Final Result          I ordered the above noted radiological studies. Reviewed by me and discussed with radiologist.  See dictation for official radiology interpretation.    PROCEDURES  Critical Care  Performed by: Macey Arredondo PA  Authorized by: Justen Montenegro MD     Critical care provider statement:     Critical care time (minutes):  35    Critical care time was exclusive of:  Separately  billable procedures and treating other patients and teaching time    Critical care was necessary to treat or prevent imminent or life-threatening deterioration of the following conditions:  Cardiac failure and CNS failure or compromise    Critical care was time spent personally by me on the following activities:  Development of treatment plan with patient or surrogate, discussions with consultants, evaluation of patient's response to treatment, examination of patient, obtaining history from patient or surrogate, review of old charts, re-evaluation of patient's condition, ordering and review of radiographic studies, ordering and review of laboratory studies, ordering and performing treatments and interventions and pulse oximetry            MEDICATIONS GIVEN IN ER  Medications   sodium chloride 0.9 % flush 10 mL ( Intravenous MAR Hold 5/15/22 1121)   sodium chloride 0.9 % infusion (75 mL/hr Intravenous New Bag 5/15/22 1126)   nitroglycerin (NITROSTAT) ointment 1 inch (1 inch Topical Given 5/15/22 1042)   insulin regular (humuLIN R,novoLIN R) injection 8 Units (8 Units Intravenous Given 5/15/22 1111)             PROGRESS AND CONSULTS    DDx includes but is not limited to acute coronary syndrome, pulmonary embolism, thoracic aortic dissection, pneumonia, pneumothorax, musculoskeletal pain, GERD or esophageal spasm, anxiety, myocarditis/pericarditis, esophageal rupture, pancreatitis.     History: 2  EC  Age: 1  Risk factors: 2    HEART score: 8        ED Course as of 05/15/22 1126   Sun May 15, 2022   1025 My interpretation of the EKG performed at 10:20 AM [KA]   1032 Interventional cardiologist called, the call list is inaccurate and he recommends calling Dr. Sánchez who is on-call. I have notified the ER  and she is calling Dr. Sánchez. [KA]   1037 I discussed the patient with Dr. Sánchez, interventional cardiologist.  He will review the EKG and call me back [KA]   1042 Dr. Sánchez reviewed the patient's EKG, not  obviously a STEMI but does look suspicious and plans to take to the Cath Lab.  I will activate the Cath Lab.  If troponin comes back elevated he recommends starting heparin, otherwise no additional orders at this time. [KA]   1054 My interpretation of the EKG performed at 10:20 AM is sinus rhythm rate 66 right axis deviation, prolonged VT and QT, there is mild ST elevation in 2 and V3 with new inverted T waves in anterior leads, new from prior on 11/8/2021. [KA]   1058 WBC: 5.86 [KA]   1058 Hemoglobin: 13.3 [KA]   1115 Cath Lab is ready and patient will be transported now. [KA]   1116 Troponin T: 0.024 [KA]   1124 Glucose(!!): 516 [KA]   1124 Creatinine(!): 1.89 [KA]   1124 Sodium(!): 127 [KA]   1125 My Interpretation of the chest x-ray has no cardiomegaly or acute infiltrate. [KA]      ED Course User Index  [KA] Macey Arredondo PA             Patient was placed in face mask in first look. Patient was wearing facemask each time I entered the room and throughout our encounter. I wore protective equipment throughout this patient encounter including a face mask, eye shield and gloves. Hand hygiene was performed before donning protective equipment and after removal when leaving the room.        DIAGNOSIS  Final diagnoses:   ST elevation myocardial infarction (STEMI), unspecified artery (HCC)   Left sided numbness   Uncontrolled type 2 diabetes mellitus with hyperglycemia (HCC)   Hyponatremia   Chronic kidney disease, unspecified CKD stage         Latest Documented Vital Signs:  As of 11:26 EDT  BP- 125/61 HR- 72 Temp- 97.6 °F (36.4 °C) O2 sat- 99%       Macey Arredondo PA  05/15/22 1127       Macey Arredondo PA  05/15/22 1127

## 2022-05-15 NOTE — H&P
Date of Hospital Visit: 05/15/22   Encounter Provider: Rishi Sánchez MD  Place of Service: [facility]  Patient Name: Fernando Gauthier  :1955        Chief complaint  Coronary artery disease with prior PCI to the circumflex and LAD  Abnormal EKG  Unstable angina    History of Present Illness  66-year-old male with a medical history of coronary artery disease with prior PCI to the LAD with 4 stents and PCI to the circumflex,  of the RCA, diabetes mellitus, who presented with report of bilateral hand tingling and numbness along with 9 out of 10 chest pain.  He took 3 nitroglycerin and subsequently is brought in by EMS.  He was noted to have hyperglycemia with a glucose of greater than 500.  His EKG showed subtle nondiagnostic ST elevation anteriorly and T wave abnormalities in the anterior leads.  The Cath Lab was activated.  He was taken for coronary angiography with severe disease of the mid circumflex and ostial to mid the distal LAD requiring 4 additional stents in the LAD and 1 stent in the circumflex.  Patient was noted in the Cath Lab to have a positive COVID test came back.      Past Medical History:   Diagnosis Date   • Anemia    • Coronary artery disease    • Depression    • Diabetes mellitus (HCC)    • GERD (gastroesophageal reflux disease)    • Gout    • Hiatal hernia    • History of hemorrhoids    • History of kidney cancer    • Hyperlipidemia    • Hypertension    • Pancreatitis    • Ulcer of gastric fundus        Past Surgical History:   Procedure Laterality Date   • AVULSION TOENAIL PLATE     • CARDIAC CATHETERIZATION     • CARDIAC CATHETERIZATION N/A 10/7/2019    Procedure: Left Heart Cath;  Surgeon: Fernando Montalvo MD;  Location: [facility] CATH INVASIVE LOCATION;  Service: Cardiovascular   • CARDIAC CATHETERIZATION  10/7/2019    Procedure: Functional Flow [place];  Surgeon: Fernando Montalvo MD;  Location: [facility] CATH INVASIVE LOCATION;  Service:  Cardiovascular   • CARDIAC CATHETERIZATION N/A 10/7/2019    Procedure: Coronary angiography;  Surgeon: Fernando Montalvo MD;  Location:  [facility]VAS[facility] LOC[facility];  Service: Cardiovascular   • CARDIAC CATHETERIZATION N/A 10/7/2019    Procedure: Left ventriculography;  Surgeon: Fernando Montalvo MD;  Location:  [facility]ATH INVAS[facility];  Service: Cardiovascular   • CHOLECYSTECTOMY  2011   • COLONOSCOPY  2010   • CORONARY ANGIOPLASTY  2008   • CORONARY STENT PLACEMENT     • ENDOSCOPY  2010   • EYE SURGERY Left     As a child   • NEPHRECTOMY Left        Medications Prior to Admission   Medication Sig Dispense Refill Last Dose   • amLODIPine (NORVASC) 10 MG tablet amlodipine 10 mg tablet daily   5/14/2022 at Unknown time   • aspirin 325 MG tablet Take 325 mg by mouth Daily.   5/14/2022 at Unknown time   • bisoprolol (ZEBeta) 10 MG tablet bisoprolol fumarate 10 mg tablet daily   5/14/2022 at Unknown time   • citalopram (CeleXA) 40 MG tablet citalopram 40 mg tablet   Take 1 tablet every day by oral route.   5/14/2022 at Unknown time   • clopidogrel (PLAVIX) 75 MG tablet clopidogrel 75 mg tablet   Take 1 tablet every day by oral route.   5/14/2022 at Unknown time   • folic acid (FOLVITE) 1 MG tablet folic acid 1 mg tablet   TAKE ONE TABLET BY MOUTH ONCE DAILY   5/14/2022 at Unknown time   • glimepiride (AMARYL) 4 MG tablet Take 4 mg by mouth Every Morning Before Breakfast.   5/14/2022 at Unknown time   • insulin NPH (humuLIN N,novoLIN N) 100 UNIT/ML injection Inject 100 Units under the skin into the appropriate area as directed. 30 units in the am  20 units in the pm   5/14/2022 at Unknown time   • losartan (COZAAR) 100 MG tablet TAKE 1 TABLET BY MOUTH EVERY DAY 90 tablet 3 5/14/2022 at Unknown time   • metFORMIN ER (GLUCOPHAGE-XR) 500 MG 24 hr tablet Take 1 tablet by mouth Daily. (Patient taking differently: Take 2,000 mg by mouth Daily.)   5/14/2022 at Unknown time   • nitroglycerin (NITROSTAT) 0.4 MG SL tablet  Place 1 tablet under the tongue Every 5 (Five) Minutes As Needed for Chest Pain. Take no more than 3 doses in 15 minutes. 30 tablet 1 5/15/2022 at Unknown time   • Omega-3 Fatty Acids (OMEGA 3 500 PO) Take  by mouth. 8 Tablets daily   5/14/2022 at Unknown time   • pioglitazone (ACTOS) 45 MG tablet Take 45 mg by mouth Daily.   5/14/2022 at Unknown time   • simvastatin (ZOCOR) 80 MG tablet simvastatin 80 mg tablet   TAKE ONE/HALF TABLET BY MOUTH ONCE DAILY   5/14/2022 at Unknown time   • Insulin NPH Human, Isophane, (NOVOLIN N SC) Inject 30 Units under the skin into the appropriate area as directed Daily.      • sildenafil (REVATIO) 20 MG tablet Take 20 mg by mouth As Needed.          Current Meds  Scheduled Meds:allopurinol, 300 mg, Oral, Daily  aspirin, 325 mg, Oral, Daily  bisoprolol, 5 mg, Oral, Q24H  clopidogrel, 75 mg, Oral, Daily  glipizide, 10 mg, Oral, QAM AC  insulin NPH, 30 Units, Subcutaneous, BID AC  isosorbide mononitrate, 30 mg, Oral, QAM  pioglitazone, 45 mg, Oral, Daily      Continuous Infusions:sodium chloride, 50 mL/hr      PRN Meds:.•  acetaminophen  •  HYDROcodone-acetaminophen  •  ondansetron **OR** ondansetron  •  sodium chloride    Allergies as of 05/15/2022   • (No Known Allergies)       Social History     Socioeconomic History   • Marital status:    • Number of children: 1   • Years of education: High school   Tobacco Use   • Smoking status: Light Tobacco Smoker     Packs/day: 0.25     Years: 40.00     Pack years: 10.00     Types: Cigarettes   • Smokeless tobacco: Never Used   • Tobacco comment: caffeine use   Substance and Sexual Activity   • Alcohol use: Yes     Comment: rare   • Drug use: Never       Family History   Problem Relation Age of Onset   • Heart disease Mother    • Diabetes Mother    • Stomach cancer Mother    • Bone cancer Mother    • Heart disease Father    • Heart attack Father    • Hypertension Father    • Heart disease Sister    • Stroke Sister    • Diabetes Sister   "  • Hypertension Sister    • Heart disease Brother    • Stroke Brother    • Hypertension Brother    • Heart disease Sister    • Diabetes Sister    • Hypertension Sister    • Heart disease Sister    • Hypertension Sister    • Heart attack Sister    • Diabetes Sister    • Heart disease Sister    • Hypertension Sister    • Alzheimer's disease Sister    • Heart disease Brother    • Hypertension Brother    • Lung cancer Brother    • Heart disease Brother    • Hypertension Brother    • Heart attack Other        REVIEW OF SYSTEMS:   12 point ROS was performed and is negative except as outlined in HPI        Objective:   Temp:  [97.6 °F (36.4 °C)] 97.6 °F (36.4 °C)  Heart Rate:  [62-72] 71  Resp:  [12-20] 14  BP: ()/(50-61) 98/51  Body mass index is 28.82 kg/m².  Flowsheet Rows    Flowsheet Row First Filed Value   Admission Height 167.6 cm (66\") Documented at 05/15/2022 1032   Admission Weight 81 kg (178 lb 9.2 oz) Documented at 05/15/2022 1032        Vitals:    05/15/22 1302   BP:    Pulse:    Resp: 14   Temp:    SpO2:      Temp:  [97.6 °F (36.4 °C)] 97.6 °F (36.4 °C)  Heart Rate:  [62-83] 63  Resp:  [12-20] 14  BP: ()/(50-76) 142/76    Intake/Output Summary (Last 24 hours) at 5/15/2022 1448  Last data filed at 5/15/2022 1010  Gross per 24 hour   Intake 1000 ml   Output --   Net 1000 ml     Flowsheet Rows    Flowsheet Row First Filed Value   Admission Height 167.6 cm (66\") Documented at 05/15/2022 1032   Admission Weight 81 kg (178 lb 9.2 oz) Documented at 05/15/2022 1032          General Appearance:    Alert, cooperative, in no acute distress   Head:    Normocephalic, without obvious abnormality, atraumatic       Neck/Lymph   No adenopathy, supple, no thyromegaly, no carotid bruit, no    JVD   Lungs:     Clear to auscultation bilaterally, no wheezes, rales, or     rhonchi    Cardiac:    Normal rate, regular rhythm, no murmur, no rub, no gallop   Chest Wall:    No abnormalities observed   GI:     Normal bowel " sounds, soft, nontender, nondistended,            no rebound tenderness   Extremities:   No cyanosis, clubbing, or edema   Circulatory/Peripheral Vascular :   Pulses palpable and equal bilaterally   Integumentary:   No bleeding or rash. Normal temperature       Neurologic:   Cranial nerves 2 - 12 grossly intact, sensation intact               Lab Review:      Results from last 7 days   Lab Units 05/15/22  1030   SODIUM mmol/L 127*   POTASSIUM mmol/L 4.1   CHLORIDE mmol/L 94*   CO2 mmol/L 20.8*   BUN mg/dL 23   CREATININE mg/dL 1.89*   CALCIUM mg/dL 8.6   BILIRUBIN mg/dL 0.3   ALK PHOS U/L 50   ALT (SGPT) U/L 28   AST (SGOT) U/L 27   GLUCOSE mg/dL 516*     Results from last 7 days   Lab Units 05/15/22  1030   TROPONIN T ng/mL 0.024     @LABRCNTbnp@  Results from last 7 days   Lab Units 05/15/22  1030   WBC 10*3/mm3 5.86   HEMOGLOBIN g/dL 13.3   HEMATOCRIT % 41.4   PLATELETS 10*3/mm3 178             @LABRCNTIP(chol,trig,hdl,ldl)    I personally viewed and interpreted the patient's EKG/Telemetry data  Assessment and Plan:  1.  Unstable angina: Abnormal EKG not diagnostic for ST elevation MI but clearly abnormal.  2.  Coronary artery disease with PCI to the LAD and circumflex coronary artery as documented above.  3.  Diabetes mellitus: Poorly controlled  4.  Hyperlipidemia  5.  Essential hypertension  6.  COVID-19    -Plan on continuing dual antiplatelet therapy with aspirin and Plavix.  Patient is aware.  -Hospitalist consultation for diabetes mellitus control but also COVID-19 care.  Asymptomatic with the COVID-19 at this point.  -Bisoprolol continued.  -Atorvastatin added to current medical regimen.    Rishi Sánchez MD  05/15/22  13:08 EDT.

## 2022-05-16 LAB
ANION GAP SERPL CALCULATED.3IONS-SCNC: 10 MMOL/L (ref 5–15)
BUN SERPL-MCNC: 24 MG/DL (ref 8–23)
BUN/CREAT SERPL: 14 (ref 7–25)
CALCIUM SPEC-SCNC: 8.6 MG/DL (ref 8.6–10.5)
CHLORIDE SERPL-SCNC: 104 MMOL/L (ref 98–107)
CHOLEST SERPL-MCNC: 171 MG/DL (ref 0–200)
CO2 SERPL-SCNC: 21 MMOL/L (ref 22–29)
CREAT SERPL-MCNC: 1.72 MG/DL (ref 0.76–1.27)
DEPRECATED RDW RBC AUTO: 42.4 FL (ref 37–54)
EGFRCR SERPLBLD CKD-EPI 2021: 43 ML/MIN/1.73
ERYTHROCYTE [DISTWIDTH] IN BLOOD BY AUTOMATED COUNT: 12.9 % (ref 12.3–15.4)
GLUCOSE BLDC GLUCOMTR-MCNC: 129 MG/DL (ref 70–130)
GLUCOSE BLDC GLUCOMTR-MCNC: 139 MG/DL (ref 70–130)
GLUCOSE BLDC GLUCOMTR-MCNC: 195 MG/DL (ref 70–130)
GLUCOSE BLDC GLUCOMTR-MCNC: 281 MG/DL (ref 70–130)
GLUCOSE BLDC GLUCOMTR-MCNC: 73 MG/DL (ref 70–130)
GLUCOSE BLDC GLUCOMTR-MCNC: 80 MG/DL (ref 70–130)
GLUCOSE BLDC GLUCOMTR-MCNC: 87 MG/DL (ref 70–130)
GLUCOSE SERPL-MCNC: 75 MG/DL (ref 65–99)
HBA1C MFR BLD: 14.2 % (ref 4.8–5.6)
HCT VFR BLD AUTO: 40.6 % (ref 37.5–51)
HDLC SERPL-MCNC: 19 MG/DL (ref 40–60)
HGB BLD-MCNC: 13.3 G/DL (ref 13–17.7)
LDLC SERPL CALC-MCNC: 87 MG/DL (ref 0–100)
LDLC/HDLC SERPL: 3.87 {RATIO}
MCH RBC QN AUTO: 29.2 PG (ref 26.6–33)
MCHC RBC AUTO-ENTMCNC: 32.8 G/DL (ref 31.5–35.7)
MCV RBC AUTO: 89 FL (ref 79–97)
PLATELET # BLD AUTO: 189 10*3/MM3 (ref 140–450)
PMV BLD AUTO: 11.2 FL (ref 6–12)
POTASSIUM SERPL-SCNC: 3.5 MMOL/L (ref 3.5–5.2)
QT INTERVAL: 451 MS
RBC # BLD AUTO: 4.56 10*6/MM3 (ref 4.14–5.8)
SODIUM SERPL-SCNC: 135 MMOL/L (ref 136–145)
TRIGL SERPL-MCNC: 392 MG/DL (ref 0–150)
VLDLC SERPL-MCNC: 65 MG/DL (ref 5–40)
WBC NRBC COR # BLD: 6.85 10*3/MM3 (ref 3.4–10.8)

## 2022-05-16 PROCEDURE — 83036 HEMOGLOBIN GLYCOSYLATED A1C: CPT | Performed by: INTERNAL MEDICINE

## 2022-05-16 PROCEDURE — 93005 ELECTROCARDIOGRAM TRACING: CPT | Performed by: INTERNAL MEDICINE

## 2022-05-16 PROCEDURE — 80061 LIPID PANEL: CPT | Performed by: INTERNAL MEDICINE

## 2022-05-16 PROCEDURE — 93010 ELECTROCARDIOGRAM REPORT: CPT | Performed by: INTERNAL MEDICINE

## 2022-05-16 PROCEDURE — 85027 COMPLETE CBC AUTOMATED: CPT | Performed by: INTERNAL MEDICINE

## 2022-05-16 PROCEDURE — 63710000001 INSULIN ISOPHANE HUMAN PER 5 UNITS: Performed by: INTERNAL MEDICINE

## 2022-05-16 PROCEDURE — 63710000001 INSULIN REGULAR HUMAN PER 5 UNITS: Performed by: INTERNAL MEDICINE

## 2022-05-16 PROCEDURE — 99222 1ST HOSP IP/OBS MODERATE 55: CPT | Performed by: PSYCHIATRY & NEUROLOGY

## 2022-05-16 PROCEDURE — 80048 BASIC METABOLIC PNL TOTAL CA: CPT | Performed by: INTERNAL MEDICINE

## 2022-05-16 PROCEDURE — 82962 GLUCOSE BLOOD TEST: CPT

## 2022-05-16 RX ORDER — BLOOD-GLUCOSE METER
KIT MISCELLANEOUS
Qty: 1 EACH | Refills: 0 | Status: CANCELLED | OUTPATIENT
Start: 2022-05-16

## 2022-05-16 RX ORDER — BLOOD SUGAR DIAGNOSTIC
STRIP MISCELLANEOUS
Qty: 100 EACH | Refills: 12 | Status: CANCELLED | OUTPATIENT
Start: 2022-05-16

## 2022-05-16 RX ADMIN — BISOPROLOL FUMARATE 5 MG: 5 TABLET, FILM COATED ORAL at 08:14

## 2022-05-16 RX ADMIN — INSULIN HUMAN 6 UNITS: 100 INJECTION, SOLUTION PARENTERAL at 08:14

## 2022-05-16 RX ADMIN — ALLOPURINOL 300 MG: 300 TABLET ORAL at 08:14

## 2022-05-16 RX ADMIN — INSULIN GLARGINE-YFGN 40 UNITS: 100 INJECTION, SOLUTION SUBCUTANEOUS at 21:36

## 2022-05-16 RX ADMIN — CLOPIDOGREL 75 MG: 75 TABLET, FILM COATED ORAL at 08:14

## 2022-05-16 RX ADMIN — ASPIRIN 325 MG: 325 TABLET ORAL at 08:14

## 2022-05-16 RX ADMIN — INSULIN HUMAN 6 UNITS: 100 INJECTION, SOLUTION PARENTERAL at 00:10

## 2022-05-16 RX ADMIN — INSULIN HUMAN 30 UNITS: 100 INJECTION, SUSPENSION SUBCUTANEOUS at 08:14

## 2022-05-16 RX ADMIN — INSULIN HUMAN 6 UNITS: 100 INJECTION, SOLUTION PARENTERAL at 11:35

## 2022-05-16 RX ADMIN — ATORVASTATIN CALCIUM 40 MG: 20 TABLET, FILM COATED ORAL at 21:33

## 2022-05-16 RX ADMIN — ISOSORBIDE MONONITRATE 30 MG: 30 TABLET ORAL at 08:14

## 2022-05-16 NOTE — CONSULTS
Patient Identification:  NAME:  Fernando Gauthier  Age:  66 y.o.   Sex:  male   :  1955   MRN:  [id number]       Chief complaint: I felt numb, reason for consult episode of numbness    History of present illness: Patient is a 66-year-old right-handed white male with a history of hyperlipidemia, hypertension, pancreatitis, diabetes who was out [place] golf [place] yesterday felt lightheaded dizzy felt like he might pass out and was not feeling good at all.  He tried to sit down to get it to pass.  At one point he felt like he developed numbness in the left face arm and leg all at the same time duration more than a few minutes.  It was reported he may be had numbness in both hands at one point or felt numb all over but he seems to say it was the left side.  That was the location duration as noted quality as described he did feel weak all over sat down but it did not help.  Context the patient was brought to the hospital and is found to have an acute myocardial infarction.  He denies any further numbness or paresthesias on his left side at this point feels like he is back to normal.  He is on dual antiplatelet therapy with aspirin and Plavix as treatment.  He also underwent cardiac cath and PCI to the LAD and circumflex coronary arteries.  He is scheduled for an MRI scan with result pending.  His daughter notes that sometimes when she is talking to him he seems to be drifting off into space and not answering quickly.  She has never witnessed him to have any automatisms or seizure activity      Past medical history:  Past Medical History:   Diagnosis Date   • Anemia    • Coronary artery disease    • Depression    • Diabetes mellitus (HCC)    • GERD (gastroesophageal reflux disease)    • Gout    • Hiatal hernia    • History of hemorrhoids    • History of kidney cancer    • Hyperlipidemia    • Hypertension    • Pancreatitis    • Ulcer of gastric fundus        Past surgical history:  Past Surgical History:    Procedure Laterality Date   • AVULSION TOENAIL PLATE     • CARDIAC CATHETERIZATION     • CARDIAC CATHETERIZATION N/A 10/7/2019    Procedure: Left Heart Cath;  Surgeon: Fernando Montalvo MD;  Location: [facility] CATH INVASIVE LOCATION;  Service: Cardiovascular   • CARDIAC CATHETERIZATION  10/7/2019    Procedure: Functional Flow [place];  Surgeon: Fernando Montalvo MD;  Location:  [facility] CATH INVASIVE LOCATION;  Service: Cardiovascular   • CARDIAC CATHETERIZATION N/A 10/7/2019    Procedure: Coronary angiography;  Surgeon: Fernando Montalvo MD;  Location:  [facility] CATH INVASIVE LOCATION;  Service: Cardiovascular   • CARDIAC CATHETERIZATION N/A 10/7/2019    Procedure: Left ventriculography;  Surgeon: Fernando Montalvo MD;  Location:  [facility] CATH INVASIVE LOCATION;  Service: Cardiovascular   • CARDIAC CATHETERIZATION N/A 5/15/2022    Procedure: Left Heart Cath;  Surgeon: Rishi Sánchez MD;  Location: [facility] CATH INVASIVE LOCATION;  Service: Cardiovascular;  Laterality: N/A;   • CARDIAC CATHETERIZATION N/A 5/15/2022    Procedure: CORONARY ANGIOGRAPHY;  Surgeon: Rishi Sánchez MD;  Location: [facility] CATH INVASIVE LOCATION;  Service: Cardiovascular;  Laterality: N/A;   • CARDIAC CATHETERIZATION N/A 5/15/2022    Procedure: Percutaneous Coronary Intervention;  Surgeon: Rishi Sánchez MD;  Location: [facility] CATH INVASIVE LOCATION;  Service: Cardiovascular;  Laterality: N/A;   • CARDIAC CATHETERIZATION N/A 5/15/2022    Procedure: Optical Coherent Tomography;  Surgeon: Rishi Sánchez MD;  Location: [facility] CATH INVASIVE LOCATION;  Service: Cardiovascular;  Laterality: N/A;   • CARDIAC CATHETERIZATION N/A 5/15/2022    Procedure: Stent SAUL coronary;  Surgeon: Rishi Sánchez MD;  Location: [facility] CATH INVASIVE LOCATION;  Service: Cardiovascular;  Laterality: N/A;   • CHOLECYSTECTOMY  2011   • COLONOSCOPY  2010   • CORONARY ANGIOPLASTY  2008   • CORONARY STENT PLACEMENT     • ENDOSCOPY  2010   • EYE  SURGERY Left     As a child   • NEPHRECTOMY Left        Allergies:  Patient has no known allergies.    Home medications:  Medications Prior to Admission   Medication Sig Dispense Refill Last Dose   • amLODIPine (NORVASC) 10 MG tablet amlodipine 10 mg tablet daily   5/14/2022 at Unknown time   • aspirin 325 MG tablet Take 325 mg by mouth Daily.   5/14/2022 at Unknown time   • bisoprolol (ZEBeta) 10 MG tablet bisoprolol fumarate 10 mg tablet daily   5/14/2022 at Unknown time   • citalopram (CeleXA) 40 MG tablet citalopram 40 mg tablet   Take 1 tablet every day by oral route.   5/14/2022 at Unknown time   • clopidogrel (PLAVIX) 75 MG tablet clopidogrel 75 mg tablet   Take 1 tablet every day by oral route.   5/14/2022 at Unknown time   • folic acid (FOLVITE) 1 MG tablet folic acid 1 mg tablet   TAKE ONE TABLET BY MOUTH ONCE DAILY   5/14/2022 at Unknown time   • glimepiride (AMARYL) 4 MG tablet Take 4 mg by mouth Every Morning Before Breakfast.   5/14/2022 at Unknown time   • insulin NPH (humuLIN N,novoLIN N) 100 UNIT/ML injection Inject 100 Units under the skin into the appropriate area as directed. 30 units in the am  20 units in the pm   5/14/2022 at Unknown time   • losartan (COZAAR) 100 MG tablet TAKE 1 TABLET BY MOUTH EVERY DAY 90 tablet 3 5/14/2022 at Unknown time   • metFORMIN ER (GLUCOPHAGE-XR) 500 MG 24 hr tablet Take 1 tablet by mouth Daily. (Patient taking differently: Take 2,000 mg by mouth Daily.)   5/14/2022 at Unknown time   • nitroglycerin (NITROSTAT) 0.4 MG SL tablet Place 1 tablet under the tongue Every 5 (Five) Minutes As Needed for Chest Pain. Take no more than 3 doses in 15 minutes. 30 tablet 1 5/15/2022 at Unknown time   • Omega-3 Fatty Acids (OMEGA 3 500 PO) Take  by mouth. 8 Tablets daily   5/14/2022 at Unknown time   • pioglitazone (ACTOS) 45 MG tablet Take 45 mg by mouth Daily.   5/14/2022 at Unknown time   • simvastatin (ZOCOR) 80 MG tablet simvastatin 80 mg tablet   TAKE ONE/HALF TABLET BY  MOUTH ONCE DAILY   5/14/2022 at Unknown time   • Insulin NPH Human, Isophane, (NOVOLIN N SC) Inject 30 Units under the skin into the appropriate area as directed Daily.      • sildenafil (REVATIO) 20 MG tablet Take 20 mg by mouth As Needed.           Hospital medications:  allopurinol, 300 mg, Oral, Daily  aspirin, 325 mg, Oral, Daily  atorvastatin, 40 mg, Oral, Nightly  bisoprolol, 5 mg, Oral, Q24H  clopidogrel, 75 mg, Oral, Daily  insulin glargine, 40 Units, Subcutaneous, Nightly  insulin regular, 0-9 Units, Subcutaneous, Q6H  isosorbide mononitrate, 30 mg, Oral, QAM         •  acetaminophen  •  dextrose  •  dextrose  •  glucagon (human recombinant)  •  HYDROcodone-acetaminophen  •  ondansetron **OR** ondansetron  •  sodium chloride    Family history:  Family History   Problem Relation Age of Onset   • Heart disease Mother    • Diabetes Mother    • Stomach cancer Mother    • Bone cancer Mother    • Heart disease Father    • Heart attack Father    • Hypertension Father    • Heart disease Sister    • Stroke Sister    • Diabetes Sister    • Hypertension Sister    • Heart disease Brother    • Stroke Brother    • Hypertension Brother    • Heart disease Sister    • Diabetes Sister    • Hypertension Sister    • Heart disease Sister    • Hypertension Sister    • Heart attack Sister    • Diabetes Sister    • Heart disease Sister    • Hypertension Sister    • Alzheimer's disease Sister    • Heart disease Brother    • Hypertension Brother    • Lung cancer Brother    • Heart disease Brother    • Hypertension Brother    • Heart attack Other        Social history:  Social History     Tobacco Use   • Smoking status: Light Tobacco Smoker     Packs/day: 0.25     Years: 40.00     Pack years: 10.00     Types: Cigarettes   • Smokeless tobacco: Never Used   • Tobacco comment: caffeine use   Substance Use Topics   • Alcohol use: Yes     Comment: rare   • Drug use: Never       Review of systems: He is back to normal now.  He denies hair  eyes ears nose throat skin bone joint  lymphatic hematologic oncologic complaints no abdominal pain bowel bladder incontinence fever chills or rash.  Specifically he does not have any more of the numbness.  There is no change in vision at any time and no true syncope but he felt like he was going to pass out    Objective:  Vitals Ranges:   Temp:  [96.8 °F (36 °C)-97.6 °F (36.4 °C)] 97.2 °F (36.2 °C)  Heart Rate:  [63-66] 66  Resp:  [15-16] 16  BP: ()/(45-74) 123/67      Physical Exam:  Patient is awake alert oriented x3 in no distress fund of knowledge good attention span concentration good recent remote memory good language function normal well-developed well-nourished in no distress pupils 3 constricting to 2 unable to visualize disc retinas extraocular movements full without nystagmus nasolabial folds palate tongue symmetrical normal hearing facial sensation head turning shoulder shrug motor 5 out of 5 x 4 extremities no atrophy fasciculations rigidity or bradykinesia reflexes trace throughout symmetrical toes downgoing bilaterally sensation normal light touch face both arms both legs coordination normal in the upper extremities he is able to ambulate without ataxia heart is regular without murmur neck supple without bruits extremities no clubbing cyanosis or edema visual acuity normal at 3 feet.  He is oriented x3    Results review:   I reviewed the patient's new clinical results.    Data review:  Lab Results (last 24 hours)     Procedure Component Value Units Date/Time    POC Glucose Once [[id number]]  (Normal) Collected: 05/16/22 1240    Specimen: Blood Updated: 05/16/22 1241     Glucose 87 mg/dL      Comment: Meter: [id number] : [id number] Roseann Claribel NA       POC Glucose Once [[id number]]  (Normal) Collected: 05/16/22 1039    Specimen: Blood Updated: 05/16/22 1042     Glucose 129 mg/dL      Comment: Meter: [id number] : [id number] Roseann Claribel NA       POC Glucose Once [[id number]]  (Normal)  Collected: 05/16/22 0611    Specimen: Blood Updated: 05/16/22 0613     Glucose 80 mg/dL      Comment: Meter: [id number] : [id number] Constanza Starks CNA       Basic Metabolic Panel [[id number]]  (Abnormal) Collected: 05/16/22 0352    Specimen: Blood Updated: 05/16/22 0424     Glucose 75 mg/dL      BUN 24 mg/dL      Creatinine 1.72 mg/dL      Sodium 135 mmol/L      Potassium 3.5 mmol/L      Chloride 104 mmol/L      CO2 21.0 mmol/L      Calcium 8.6 mg/dL      BUN/Creatinine Ratio 14.0     Anion Gap 10.0 mmol/L      eGFR 43.0 mL/min/1.73      Comment: National Kidney Foundation and American Society of Nephrology (ASN) Task Force recommended calculation based on the Chronic Kidney Disease Epidemiology Collaboration (CKD-EPI) equation refit without adjustment for race.       Narrative:      GFR Normal >60  Chronic Kidney Disease <60  Kidney Failure <15      Lipid Panel [[id number]]  (Abnormal) Collected: 05/16/22 0352    Specimen: Blood Updated: 05/16/22 0423     Total Cholesterol 171 mg/dL      Triglycerides 392 mg/dL      HDL Cholesterol 19 mg/dL      LDL Cholesterol  87 mg/dL      VLDL Cholesterol 65 mg/dL      LDL/HDL Ratio 3.87    Narrative:      Cholesterol Reference Ranges  (U.S. Department of Health and Human Services ATP III Classifications)    Desirable          <200 mg/dL  Borderline High    200-239 mg/dL  High Risk          >240 mg/dL      Triglyceride Reference Ranges  (U.S. Department of Health and Human Services ATP III Classifications)    Normal           <150 mg/dL  Borderline High  150-199 mg/dL  High             200-499 mg/dL  Very High        >500 mg/dL    HDL Reference Ranges  (U.S. Department of Health and Human Services ATP III Classifications)    Low     <40 mg/dl (major risk factor for CHD)  High    >60 mg/dl ('negative' risk factor for CHD)        LDL Reference Ranges  (U.S. Department of Health and Human Services ATP III Classifications)    Optimal          <100 mg/dL  Near Optimal     100-129  mg/dL  Borderline High  130-159 mg/dL  High             160-189 mg/dL  Very High        >189 mg/dL    Hemoglobin A1c [[id number]]  (Abnormal) Collected: 05/16/22 0352    Specimen: Blood Updated: 05/16/22 0418     Hemoglobin A1C 14.20 %     Narrative:      Hemoglobin A1C Ranges:    Increased Risk for Diabetes  5.7% to 6.4%  Diabetes                     >= 6.5%  Diabetic Goal                < 7.0%    CBC (No Diff) [[id number]]  (Normal) Collected: 05/16/22 0352    Specimen: Blood Updated: 05/16/22 0407     WBC 6.85 10*3/mm3      RBC 4.56 10*6/mm3      Hemoglobin 13.3 g/dL      Hematocrit 40.6 %      MCV 89.0 fL      MCH 29.2 pg      MCHC 32.8 g/dL      RDW 12.9 %      RDW-SD 42.4 fl      MPV 11.2 fL      Platelets 189 10*3/mm3     POC Glucose Once [[id number]]  (Abnormal) Collected: 05/15/22 2358    Specimen: Blood Updated: 05/16/22 0000     Glucose 281 mg/dL      Comment: Meter: [id number] : [id number] Edds Tereza CNA       POC Glucose Once [[id number]]  (Abnormal) Collected: 05/15/22 1944    Specimen: Blood Updated: 05/15/22 2030     Glucose 261 mg/dL      Comment: Meter: [id number] : [id number] Edds Tereza CNA       POC Glucose Once [[id number]]  (Abnormal) Collected: 05/15/22 1611    Specimen: Blood Updated: 05/15/22 1612     Glucose 233 mg/dL      Comment: Meter: [id number] : [id number] Estevan TRAN       [place] [facility] [[id number]] Collected: 05/15/22 1030    Specimen: Blood Updated: 05/15/22 1452    Narrative:      The following orders were created for panel order [place] [facility].  Procedure                               Abnormality         Status                     ---------                               -----------         ------                     Green Top (Gel)[[id number]]                                  Final result               Lavender Top[[id number]]                                     Final result               Gold Top - SST[[id number]]                                                               Light Blue Top[[id number]]                                   Final result                 Please view results for these tests on the individual orders.           Imaging:  Imaging Results (Last 24 Hours)     ** No results found for the last 24 hours. **      PPE worn at all times washed before washed afterwards disposed of everything properly was now within 6 feet of them for more than few minutes during my exam no aerosols used      Assessment and Plan:       ST elevation myocardial infarction (STEMI), unspecified artery (HCC)    I believe this patient had  vertebrobasilar insufficiency (VBI) while he was on the golf course with his grandson.  He had definitely not been feeling good prior to that was feeling lightheaded like he might faint and then developed numbness, according to him, down the left side of his body face arm and leg.  It was not lasting very long resolved and he has been diagnosed with an ST elevation myocardial infarction and had appropriate endovascular cardiology procedures for that.  In short, technically speaking, this patient had left face/arm/leg numbness that would be consistent with a classic TIA if it were not preceded by this lightheaded dizzy feeling and the impending loss of consciousness likely associated with his acute myocardial infarction.  I do not believe he suffered a cardiac source of embolism to cause this distribution of numbness.  Therefore, no matter what the MRI scan shows.  I like dual antiplatelet agents for this patient in the form of aspirin and Plavix.  I will review the MRI scan with an independent eyeball review and I am keeping my fingers crossed that it will not show any acute stroke syndrome.  He does not look or sound like an acute stroke syndrome at this time.  If the MRI is negative he will be okay to be discharged home.  Thank you      Fran Kelsey MD  05/16/22  14:35 EDT

## 2022-05-16 NOTE — CONSULTS
CONSULT NOTE    INTERNAL MEDICINE   [facility]       Patient Identification:  Name: Fernando Gauthier  Age: 66 y.o.  Sex: male  :  1955  MRN: [id number]             Date of Consultation:  05/15/22          Primary Care Physician: Jeff Keating MD                               Requesting Physician: dr santos  Reason for Consultation:diabetes management    Chief Complaint:  66 year old gentleman who presented to the emergency room with chest pain as well as numbness and tingling of his hands; he has a history of diabetes and his blood sugar was over 500; he had st elevation on ecg and was taken to the cath lab where a stent was placed; we are asked to see him to help with management of diabetes; he tested positive for covid    History of Present Illness:   As above      Past Medical History:  Past Medical History:   Diagnosis Date   • Anemia    • Coronary artery disease    • Depression    • Diabetes mellitus (HCC)    • GERD (gastroesophageal reflux disease)    • Gout    • Hiatal hernia    • History of hemorrhoids    • History of kidney cancer    • Hyperlipidemia    • Hypertension    • Pancreatitis    • Ulcer of gastric fundus      Past Surgical History:  Past Surgical History:   Procedure Laterality Date   • AVULSION TOENAIL PLATE     • CARDIAC CATHETERIZATION     • CARDIAC CATHETERIZATION N/A 10/7/2019    Procedure: Left Heart Cath;  Surgeon: Fernando Montalvo MD;  Location: [facility] CATH INVASIVE LOCATION;  Service: Cardiovascular   • CARDIAC CATHETERIZATION  10/7/2019    Procedure: Functional Flow Spencer;  Surgeon: Fernando Montalvo MD;  Location: [facility] CATH INVASIVE LOCATION;  Service: Cardiovascular   • CARDIAC CATHETERIZATION N/A 10/7/2019    Procedure: Coronary angiography;  Surgeon: Fernando Montalvo MD;  Location: [facility] CATH INVASIVE LOCATION;  Service: Cardiovascular   • CARDIAC CATHETERIZATION N/A 10/7/2019    Procedure: Left ventriculography;  Surgeon: Feranndo Montalvo MD;   Location: [facility] CATH INVASIVE LOCATION;  Service: Cardiovascular   • CHOLECYSTECTOMY  2011   • COLONOSCOPY  2010   • CORONARY ANGIOPLASTY  2008   • CORONARY STENT PLACEMENT     • ENDOSCOPY  2010   • EYE SURGERY Left     As a child   • NEPHRECTOMY Left       Home Meds:  Medications Prior to Admission   Medication Sig Dispense Refill Last Dose   • amLODIPine (NORVASC) 10 MG tablet amlodipine 10 mg tablet daily   5/14/2022 at Unknown time   • aspirin 325 MG tablet Take 325 mg by mouth Daily.   5/14/2022 at Unknown time   • bisoprolol (ZEBeta) 10 MG tablet bisoprolol fumarate 10 mg tablet daily   5/14/2022 at Unknown time   • citalopram (CeleXA) 40 MG tablet citalopram 40 mg tablet   Take 1 tablet every day by oral route.   5/14/2022 at Unknown time   • clopidogrel (PLAVIX) 75 MG tablet clopidogrel 75 mg tablet   Take 1 tablet every day by oral route.   5/14/2022 at Unknown time   • folic acid (FOLVITE) 1 MG tablet folic acid 1 mg tablet   TAKE ONE TABLET BY MOUTH ONCE DAILY   5/14/2022 at Unknown time   • glimepiride (AMARYL) 4 MG tablet Take 4 mg by mouth Every Morning Before Breakfast.   5/14/2022 at Unknown time   • insulin NPH (humuLIN N,novoLIN N) 100 UNIT/ML injection Inject 100 Units under the skin into the appropriate area as directed. 30 units in the am  20 units in the pm   5/14/2022 at Unknown time   • losartan (COZAAR) 100 MG tablet TAKE 1 TABLET BY MOUTH EVERY DAY 90 tablet 3 5/14/2022 at Unknown time   • metFORMIN ER (GLUCOPHAGE-XR) 500 MG 24 hr tablet Take 1 tablet by mouth Daily. (Patient taking differently: Take 2,000 mg by mouth Daily.)   5/14/2022 at Unknown time   • nitroglycerin (NITROSTAT) 0.4 MG SL tablet Place 1 tablet under the tongue Every 5 (Five) Minutes As Needed for Chest Pain. Take no more than 3 doses in 15 minutes. 30 tablet 1 5/15/2022 at Unknown time   • Omega-3 Fatty Acids (OMEGA 3 500 PO) Take  by mouth. 8 Tablets daily   5/14/2022 at Unknown time   • pioglitazone (ACTOS) 45 MG  tablet Take 45 mg by mouth Daily.   5/14/2022 at Unknown time   • simvastatin (ZOCOR) 80 MG tablet simvastatin 80 mg tablet   TAKE ONE/HALF TABLET BY MOUTH ONCE DAILY   5/14/2022 at Unknown time   • Insulin NPH Human, Isophane, (NOVOLIN N SC) Inject 30 Units under the skin into the appropriate area as directed Daily.      • sildenafil (REVATIO) 20 MG tablet Take 20 mg by mouth As Needed.        Current Meds:     Current Facility-Administered Medications:   •  acetaminophen (TYLENOL) tablet 650 mg, 650 mg, Oral, Q4H PRN, Rishi Sánchez MD  •  allopurinol (ZYLOPRIM) tablet 300 mg, 300 mg, Oral, Daily, Rishi Sánchez MD, 300 mg at 05/15/22 1808  •  aspirin tablet 325 mg, 325 mg, Oral, Daily, Rishi Sánchez MD  •  atorvastatin (LIPITOR) tablet 40 mg, 40 mg, Oral, Nightly, Rishi Sánchez MD, 40 mg at 05/15/22 2040  •  bisoprolol (ZEBeta) tablet 5 mg, 5 mg, Oral, Q24H, Rishi Sánchez MD  •  clopidogrel (PLAVIX) tablet 75 mg, 75 mg, Oral, Daily, Rishi Sánchez MD  •  glipizide (GLUCOTROL) tablet 10 mg, 10 mg, Oral, QAM Princess LINTON Christopher A, MD, 10 mg at 05/15/22 1808  •  HYDROcodone-acetaminophen (NORCO) 5-325 MG per tablet 1 tablet, 1 tablet, Oral, Q4H PRN, Rishi Sánchez MD  •  insulin NPH (humuLIN N,novoLIN N) injection 30 Units, 30 Units, Subcutaneous, BID Princess Christopher A, MD, 30 Units at 05/15/22 1808  •  isosorbide mononitrate (IMDUR) 24 hr tablet 30 mg, 30 mg, Oral, QAM, Rishi Sánchez MD, 30 mg at 05/15/22 1808  •  ondansetron (ZOFRAN) tablet 4 mg, 4 mg, Oral, Q6H PRN **OR** ondansetron (ZOFRAN) injection 4 mg, 4 mg, Intravenous, Q6H PRN, Rishi Sánchez MD  •  pioglitazone (ACTOS) tablet 45 mg, 45 mg, Oral, Daily, Rishi Sánchez MD, 45 mg at 05/15/22 1233  •  sodium chloride 0.9 % flush 10 mL, 10 mL, Intravenous, PRN, Rishi Sánchez MD, 10 mL at 05/15/22 1032  Allergies:  No Known Allergies  Social History:    Social History     Socioeconomic History   • Marital status:    • Number of children: 1   • Years of education: High school   Tobacco Use   • Smoking status: Light Tobacco Smoker     Packs/day: 0.25     Years: 40.00     Pack years: 10.00     Types: Cigarettes   • Smokeless tobacco: Never Used   • Tobacco comment: caffeine use   Substance and Sexual Activity   • Alcohol use: Yes     Comment: rare   • Drug use: Never     Family History:  Family History   Problem Relation Age of Onset   • Heart disease Mother    • Diabetes Mother    • Stomach cancer Mother    • Bone cancer Mother    • Heart disease Father    • Heart attack Father    • Hypertension Father    • Heart disease Sister    • Stroke Sister    • Diabetes Sister    • Hypertension Sister    • Heart disease Brother    • Stroke Brother    • Hypertension Brother    • Heart disease Sister    • Diabetes Sister    • Hypertension Sister    • Heart disease Sister    • Hypertension Sister    • Heart attack Sister    • Diabetes Sister    • Heart disease Sister    • Hypertension Sister    • Alzheimer's disease Sister    • Heart disease Brother    • Hypertension Brother    • Lung cancer Brother    • Heart disease Brother    • Hypertension Brother    • Heart attack Other           Review of Systems  See history of present illness and past medical history.  Patient denies headache, dizziness, syncope, falls, trauma, change in vision, change in hearing, change in taste, changes in weight, changes in appetite, focal weakness, numbness, or paresthesia.  Patient denies  palpitations, dyspnea, orthopnea, PND, cough, sinus pressure, rhinorrhea, epistaxis, hemoptysis, nausea, vomiting,hematemesis, diarrhea, constipation or hematchezia.  Denies cold or heat intolerance, polydipsia, polyuria, polyphagia. Denies hematuria, pyuria, dysuria, hesitancy, frequency or urgency. Denies consumption of raw and under cooked meats foods or change in water source.  Denies fever, chills,  "sweats, night sweats.  Denies missing any routine medications. Remainder of ROS is negative.      Vitals:   /65 (BP Location: Right arm, Patient Position: Lying)   Pulse 63   Temp 97.6 °F (36.4 °C) (Oral)   Resp 15   Ht 167.6 cm (66\")   Wt 81 kg (178 lb 9.2 oz)   SpO2 97%   BMI 28.82 kg/m²   I/O:     Intake/Output Summary (Last 24 hours) at 5/15/2022 2100  Last data filed at 5/15/2022 2000  Gross per 24 hour   Intake 1000 ml   Output --   Net 1000 ml     Exam:  General Appearance:    Alert, cooperative, no distress, appears stated age   Head:    Normocephalic, without obvious abnormality, atraumatic   Eyes:    PERRL, conjunctivae/corneas clear, EOM's intact, both eyes   Ears:    Normal external ear canals, both ears   Nose:   Nares normal, septum midline, mucosa normal, no drainage    or sinus tenderness   Throat:   Lips, tongue, gums normal; oral mucosa pink and moist   Neck:   Supple, symmetrical, trachea midline, no adenopathy;     thyroid:  no enlargement/tenderness/nodules; no carotid    bruit or JVD   Back:     Symmetric, no curvature, ROM normal, no CVA tenderness   Lungs:     Decreased breath sounds bilaterally, respirations unlabored   Chest Wall:    No tenderness or deformity    Heart:    Regular rate and rhythm, S1 and S2 normal, no murmur, rub   or gallop   Abdomen:     Soft, nontender, bowel sounds active all four quadrants,     no masses, no hepatomegaly, no splenomegaly   Extremities:   Extremities normal, atraumatic, no cyanosis or edema   Pulses:   Pulses palpable in all extremities; symmetric all extremities   Skin:   Skin color normal, Skin is warm and dry,  no rashes or palpable lesions   Neurologic:   CNII-XII intact, motor strength grossly intact, sensation grossly intact to light touch, no focal deficits noted       Data Review:  Labs in chart were reviewed.  WBC   Date Value Ref Range Status   05/15/2022 5.86 3.40 - 10.80 10*3/mm3 Final     Hemoglobin   Date Value Ref Range " Status   05/15/2022 13.3 13.0 - 17.7 g/dL Final     Hematocrit   Date Value Ref Range Status   05/15/2022 41.4 37.5 - 51.0 % Final     Platelets   Date Value Ref Range Status   05/15/2022 178 140 - 450 10*3/mm3 Final     Sodium   Date Value Ref Range Status   05/15/2022 127 (L) 136 - 145 mmol/L Final     Potassium   Date Value Ref Range Status   05/15/2022 4.1 3.5 - 5.2 mmol/L Final     Chloride   Date Value Ref Range Status   05/15/2022 94 (L) 98 - 107 mmol/L Final     CO2   Date Value Ref Range Status   05/15/2022 20.8 (L) 22.0 - 29.0 mmol/L Final     BUN   Date Value Ref Range Status   05/15/2022 23 8 - 23 mg/dL Final     Creatinine   Date Value Ref Range Status   05/15/2022 1.89 (H) 0.76 - 1.27 mg/dL Final     Glucose   Date Value Ref Range Status   05/15/2022 516 (C) 65 - 99 mg/dL Final     Calcium   Date Value Ref Range Status   05/15/2022 8.6 8.6 - 10.5 mg/dL Final     AST (SGOT)   Date Value Ref Range Status   05/15/2022 27 1 - 40 U/L Final     ALT (SGPT)   Date Value Ref Range Status   05/15/2022 28 1 - 41 U/L Final     Alkaline Phosphatase   Date Value Ref Range Status   05/15/2022 50 39 - 117 U/L Final               Imaging Results (Last 7 Days)     Procedure Component Value Units Date/Time    XR Chest 1 View [[id number]] Collected: 05/15/22 1034     Updated: 05/15/22 1038    Narrative:      PORTABLE CHEST 05/15/2022 AT 1024 HOURS     CLINICAL HISTORY: Chest pain     The heart, lungs and mediastinal structures appear within normal limits.  There are no infiltrates or effusions. There is no pneumothorax.     This report was finalized on 5/15/2022 10:35 AM by Dr. Brandon Fuentes M.D.           Past Medical History:   Diagnosis Date   • Anemia    • Coronary artery disease    • Depression    • Diabetes mellitus (HCC)    • GERD (gastroesophageal reflux disease)    • Gout    • Hiatal hernia    • History of hemorrhoids    • History of kidney cancer    • Hyperlipidemia    • Hypertension    • Pancreatitis    •  Ulcer of gastric fundus        Assessment:  Active Hospital Problems    Diagnosis  POA   • ST elevation myocardial infarction (STEMI), unspecified artery (HCC) [I21.3]  Yes      Resolved Hospital Problems   No resolved problems to display.   diabetes  Hypertension  Hyperlipidemia  ckd3  hyponatremia    Plan:  Will continue accu checks   nph and mealtime insulin as well as sliding scale  Stop oral agents  Adjust doses as indicated  Monitor creatinine  Thanks for involving us in his care  Will ask the diabetes educator to see him as well  Follow while he remains hospitalized    Katja Hawley MD   5/15/2022  21:00 EDT

## 2022-05-16 NOTE — PROGRESS NOTES
"DAILY PROGRESS NOTE  [facility]    Patient Identification:  Name: Fernando Gauthier  Age: 66 y.o.  Sex: male  :  1955  MRN: [id number]         Primary Care Physician: Jeff Keating MD      Subjective  Patient with no acute complaints.    Discussed his diabetic regime and treatment.  The patient admits he has not been following his blood sugars at all recently.  A1c was in excess of 14 on presentation.  He apparently has not been taking his insulin also.  He complains that when he takes insulin his blood sugar gets too low.  On reviewing his med list he notes that he has not taken Actos in over a year and that he did have issues with lower extremity edema with this.  Discussed his insulin regime.  He states that initially there is insurance issues with Lantus and he is still on the NPH.    Patient incidentally tested positive for COVID-19 on presentation.  He had a Elijah & Elijah's vaccine early on in the epidemic.  No boosters.  No exposures that he knows of.  Only symptom he notices he has had a semidry, mild cough for couple weeks.  No fever sweats or chills, myalgias etc.    His daughter is at bedside.  She is concerned that the patient has been having episodes of confusion and/or increased forgetfulness for about 6 months now.  She states is gotten worse within the last month.   She states that sometimes he just looks confused and does not seem to understand what they are saying.  Patient states that sometimes he just has difficulty finding his words.  States he rarely drinks.    Objective:  General Appearance:  Comfortable, well-appearing, in no acute distress and not in pain.    Vital signs: (most recent): Blood pressure 137/74, pulse 65, temperature 97.3 °F (36.3 °C), temperature source Oral, resp. rate 16, height 167.6 cm (66\"), weight 81 kg (178 lb 9.2 oz), SpO2 96 %.    Lungs:  Normal effort and normal respiratory rate.  Breath sounds clear to auscultation.    Heart: Normal " rate.  Regular rhythm.    Extremities: There is no dependent edema.    Neurological: Patient is alert and oriented to person, place and time.    Skin:  Warm and dry.                Vital signs in last 24 hours:  Temp:  [96.8 °F (36 °C)-97.6 °F (36.4 °C)] 97.3 °F (36.3 °C)  Heart Rate:  [62-83] 65  Resp:  [12-20] 16  BP: ()/(45-76) 137/74    Intake/Output:    Intake/Output Summary (Last 24 hours) at 5/16/2022 1040  Last data filed at 5/16/2022 0835  Gross per 24 hour   Intake 600 ml   Output --   Net 600 ml         Results from last 7 days   Lab Units 05/16/22  0352 05/15/22  1030   WBC 10*3/mm3 6.85 5.86   HEMOGLOBIN g/dL 13.3 13.3   PLATELETS 10*3/mm3 189 178     Results from last 7 days   Lab Units 05/16/22  0352 05/15/22  1030   SODIUM mmol/L 135* 127*   POTASSIUM mmol/L 3.5 4.1   CHLORIDE mmol/L 104 94*   CO2 mmol/L 21.0* 20.8*   BUN mg/dL 24* 23   CREATININE mg/dL 1.72* 1.89*   GLUCOSE mg/dL 75 516*   Estimated Creatinine Clearance: 41.7 mL/min (A) (by C-G formula based on SCr of 1.72 mg/dL (H)).  Results from last 7 days   Lab Units 05/16/22  0352 05/15/22  1030   CALCIUM mg/dL 8.6 8.6   ALBUMIN g/dL  --  3.50     Results from last 7 days   Lab Units 05/15/22  1030   ALBUMIN g/dL 3.50   BILIRUBIN mg/dL 0.3   ALK PHOS U/L 50   AST (SGOT) U/L 27   ALT (SGPT) U/L 28       Assessment:    ST elevation myocardial infarction (STEMI), unspecified artery (HCC)    Diabetes type 2 with hyperglycemia: Very readily responded by resuming his insulin.  I will in fact back off and NPH just a little.  Diabetic education pending.  I would like to see if his insurance would cover Lantus or similar form of insulin.  Actos needs to be taken off the patient's list of medicines.  I am not sure what his ejection fraction is but this may actually be contraindicated.  Metformin presently on hold due to cardiac catheterization.  His renal function is actually borderline at this point for continuing with this medicine.  He may be  better served with a GLP-1 agonist.  CKD III: Impacting management of diabetes as well as other issues.  Monitor closely status post cardiac catheterization.  Keep metformin on hold for the time being.  COVID-19 positive: Minimal symptoms and the cough may be incidental.  Monitor closely.  Continue isolation for the time being.  Reported bouts of confusion: The daughter is very concerned about this.  We will see if we get him introduced to neurologist here if there is no objections.    All problems new to this examiner.    Plan:  Please see above.  We certainly appreciate being involved in this gentleman's care and we will be happy to follow him along with you.    COVID protective gear worn during the encounter.  Hands sanitized before and after.    Gee Zafar MD  5/16/2022  10:40 EDT

## 2022-05-16 NOTE — CONSULTS
Provided phase II information along with the contact information for cardiac rehab here at [facility]. Will call patient after discharge.

## 2022-05-16 NOTE — PROGRESS NOTES
He is feeling better.  Concerns about his neurological symptoms on presentation.  I am going to get an MRI.  If it is normal, I will discharge him later.

## 2022-05-16 NOTE — CONSULTS
"Diabetes Education  Assessment/Teaching    Patient Name:  Fernando Gauthier  YOB: 1955  MRN: [id number]  Admit Date:  5/15/2022      Assessment Date:  5/16/2022  Flowsheet Row Most Recent Value   General Information     Referral From: MD pelletier   Height 167.6 cm (66\")   Height Method Stated   Weight 81 kg (178 lb 9.2 oz)   Diabetes History    What type of diabetes do you have? Type 2   Length of Diabetes Diagnosis 6 - 10 years  [Pt states 8-10 years]   Do you test your blood sugar at home? yes   Frequency of checks Pt states most recently once a week   Meter type Unsure but states it was older than 5 years old   Who performs the test? self   Have you had low blood sugar? (<70mg/dl) yes  [Pt states was having low blood sugars when taking insulin so he stopped.]   Education Preferences    Barriers to Learning other (comment)  [With patient's permission, consultation via pt's daughter Mariella's cell phone on speaker with patient and Mariella in patient's room, due to enhanced isolation.]   Assessment Topics    Taking Medication - Assessment Needs education   Problem Solving - Assessment Needs education   Monitoring - Assessment Needs education   DM Goals    Taking Medication - Goal 0-7 days from discharge   Problem Solving - Goal 0-30 days from discharge   Reducing Risk - Goal 30-90 days from discharge   Monitoring - Goal 0-7 days from discharge   Contact Plan Follow-up medical care          Flowsheet Row Most Recent Value   DM Education Needs    Meter Needs meter   Meter Type --  [Meter ordered]   Medication Insulin, Actions, Side effects  [Lantus insulin.  Pt states was previously on pen devices and prefers pen device.]   Problem Solving Hypoglycemia  [Encourage to monitor during hypo events.]   Healthy Eating Reviewed meal plan, Basic meal plan provided  [Encouraged food intake three times a day to prevent hypoglycemia.]   Healthy Coping Appropriate   Discharge Plan Home, Follow-up with PCP "   Motivation Engaged   Teaching Method Discussion, Explanation   Patient Response Verbalized understanding        Electronically signed by:  Bruno Ga RN, [facility]  05/16/22 15:26 EDT

## 2022-05-17 ENCOUNTER — READMISSION MANAGEMENT (OUTPATIENT)
Dept: CALL CENTER | Facility: HOSPITAL | Age: 67
End: 2022-05-17

## 2022-05-17 ENCOUNTER — APPOINTMENT (OUTPATIENT)
Dept: MRI IMAGING | Facility: HOSPITAL | Age: 67
End: 2022-05-17

## 2022-05-17 VITALS
WEIGHT: 178.57 LBS | DIASTOLIC BLOOD PRESSURE: 78 MMHG | RESPIRATION RATE: 16 BRPM | TEMPERATURE: 98.3 F | BODY MASS INDEX: 28.7 KG/M2 | SYSTOLIC BLOOD PRESSURE: 133 MMHG | OXYGEN SATURATION: 96 % | HEART RATE: 67 BPM | HEIGHT: 66 IN

## 2022-05-17 LAB
ANION GAP SERPL CALCULATED.3IONS-SCNC: 8 MMOL/L (ref 5–15)
BUN SERPL-MCNC: 19 MG/DL (ref 8–23)
BUN/CREAT SERPL: 14.4 (ref 7–25)
CALCIUM SPEC-SCNC: 8.4 MG/DL (ref 8.6–10.5)
CHLORIDE SERPL-SCNC: 103 MMOL/L (ref 98–107)
CO2 SERPL-SCNC: 24 MMOL/L (ref 22–29)
CREAT SERPL-MCNC: 1.32 MG/DL (ref 0.76–1.27)
EGFRCR SERPLBLD CKD-EPI 2021: 59.1 ML/MIN/1.73
GLUCOSE BLDC GLUCOMTR-MCNC: 161 MG/DL (ref 70–130)
GLUCOSE BLDC GLUCOMTR-MCNC: 68 MG/DL (ref 70–130)
GLUCOSE SERPL-MCNC: 76 MG/DL (ref 65–99)
POTASSIUM SERPL-SCNC: 3.6 MMOL/L (ref 3.5–5.2)
SODIUM SERPL-SCNC: 135 MMOL/L (ref 136–145)
TSH SERPL DL<=0.05 MIU/L-ACNC: 0.49 UIU/ML (ref 0.27–4.2)

## 2022-05-17 PROCEDURE — 70553 MRI BRAIN STEM W/O & W/DYE: CPT

## 2022-05-17 PROCEDURE — A9577 INJ MULTIHANCE: HCPCS | Performed by: INTERNAL MEDICINE

## 2022-05-17 PROCEDURE — 84443 ASSAY THYROID STIM HORMONE: CPT | Performed by: HOSPITALIST

## 2022-05-17 PROCEDURE — 99239 HOSP IP/OBS DSCHRG MGMT >30: CPT | Performed by: INTERNAL MEDICINE

## 2022-05-17 PROCEDURE — 80048 BASIC METABOLIC PNL TOTAL CA: CPT | Performed by: HOSPITALIST

## 2022-05-17 PROCEDURE — 0 GADOBENATE DIMEGLUMINE 529 MG/ML SOLUTION: Performed by: INTERNAL MEDICINE

## 2022-05-17 PROCEDURE — 82962 GLUCOSE BLOOD TEST: CPT

## 2022-05-17 RX ORDER — ATORVASTATIN CALCIUM 40 MG/1
40 TABLET, FILM COATED ORAL NIGHTLY
Qty: 90 TABLET | Refills: 0 | Status: SHIPPED | OUTPATIENT
Start: 2022-05-17 | End: 2022-06-24 | Stop reason: SDUPTHER

## 2022-05-17 RX ORDER — PEN NEEDLE, DIABETIC 30 GX3/16"
1 NEEDLE, DISPOSABLE MISCELLANEOUS DAILY
Qty: 100 EACH | Refills: 0 | Status: SHIPPED | OUTPATIENT
Start: 2022-05-17

## 2022-05-17 RX ORDER — ISOSORBIDE MONONITRATE 30 MG/1
30 TABLET, EXTENDED RELEASE ORAL EVERY MORNING
Qty: 90 TABLET | Refills: 0 | Status: SHIPPED | OUTPATIENT
Start: 2022-05-18

## 2022-05-17 RX ORDER — CLOPIDOGREL BISULFATE 75 MG/1
75 TABLET ORAL DAILY
Qty: 90 TABLET | Refills: 3 | Status: SHIPPED | OUTPATIENT
Start: 2022-05-18

## 2022-05-17 RX ORDER — INSULIN GLARGINE 100 [IU]/ML
35 INJECTION, SOLUTION SUBCUTANEOUS NIGHTLY
Qty: 15 ML | Refills: 0 | Status: SHIPPED | OUTPATIENT
Start: 2022-05-17

## 2022-05-17 RX ORDER — BLOOD SUGAR DIAGNOSTIC
STRIP MISCELLANEOUS
Qty: 100 EACH | Refills: 12 | Status: SHIPPED | OUTPATIENT
Start: 2022-05-17

## 2022-05-17 RX ORDER — LANCETS 30 GAUGE
EACH MISCELLANEOUS
Qty: 100 EACH | Refills: 0 | Status: SHIPPED | OUTPATIENT
Start: 2022-05-17

## 2022-05-17 RX ORDER — ALLOPURINOL 300 MG/1
300 TABLET ORAL DAILY
Qty: 30 TABLET | Refills: 0 | Status: SHIPPED | OUTPATIENT
Start: 2022-05-18

## 2022-05-17 RX ORDER — BISOPROLOL FUMARATE 5 MG/1
5 TABLET, FILM COATED ORAL
Qty: 90 TABLET | Refills: 0 | Status: SHIPPED | OUTPATIENT
Start: 2022-05-18 | End: 2022-06-24 | Stop reason: SDUPTHER

## 2022-05-17 RX ORDER — BLOOD-GLUCOSE METER
1 EACH MISCELLANEOUS TAKE AS DIRECTED
Qty: 1 KIT | Refills: 0 | Status: SHIPPED | OUTPATIENT
Start: 2022-05-17

## 2022-05-17 RX ORDER — ASPIRIN 81 MG/1
81 TABLET ORAL DAILY
Qty: 30 TABLET | Refills: 6 | Status: SHIPPED | OUTPATIENT
Start: 2022-05-17

## 2022-05-17 RX ORDER — INSULIN GLARGINE 100 [IU]/ML
40 INJECTION, SOLUTION SUBCUTANEOUS NIGHTLY
Qty: 15 ML | Refills: 0 | Status: SHIPPED | OUTPATIENT
Start: 2022-05-17 | End: 2022-05-17 | Stop reason: SDUPTHER

## 2022-05-17 RX ADMIN — ASPIRIN 325 MG: 325 TABLET ORAL at 09:01

## 2022-05-17 RX ADMIN — BISOPROLOL FUMARATE 5 MG: 5 TABLET, FILM COATED ORAL at 09:01

## 2022-05-17 RX ADMIN — ALLOPURINOL 300 MG: 300 TABLET ORAL at 09:01

## 2022-05-17 RX ADMIN — CLOPIDOGREL 75 MG: 75 TABLET, FILM COATED ORAL at 09:01

## 2022-05-17 RX ADMIN — GADOBENATE DIMEGLUMINE 16 ML: 529 INJECTION, SOLUTION INTRAVENOUS at 07:03

## 2022-05-17 RX ADMIN — ISOSORBIDE MONONITRATE 30 MG: 30 TABLET ORAL at 06:23

## 2022-05-17 NOTE — DISCHARGE SUMMARY
Patient Name: Fernando Gauthier  :1955  66 y.o.    Date of Admit: 5/15/2022  Date of Discharge:  2022    Discharge Diagnosis:  Problems Addressed this Visit        Cardiac and Vasculature    ST elevation myocardial infarction (STEMI), unspecified artery (HCC) - Primary    Relevant Medications    bisoprolol (ZEBeta) 5 MG tablet (Start on 2022)    clopidogrel (PLAVIX) 75 MG tablet (Start on 2022)    isosorbide mononitrate (IMDUR) 30 MG 24 hr tablet (Start on 2022)    Other Relevant Orders    Ambulatory Referral to Cardiac Rehab      Other Visit Diagnoses     Left sided numbness        Uncontrolled type 2 diabetes mellitus with hyperglycemia (HCC)        Relevant Medications    Insulin Glargine (Lantus SoloStar) 100 UNIT/ML injection pen    Hyponatremia        Chronic kidney disease, unspecified CKD stage          Diagnoses       Codes Comments    ST elevation myocardial infarction (STEMI), unspecified artery (HCC)    -  Primary ICD-10-CM: I21.3  ICD-9-CM: 410.90     Left sided numbness     ICD-10-CM: R20.0  ICD-9-CM: 782.0     Uncontrolled type 2 diabetes mellitus with hyperglycemia (HCC)     ICD-10-CM: E11.65  ICD-9-CM: 250.02     Hyponatremia     ICD-10-CM: E87.1  ICD-9-CM: 276.1     Chronic kidney disease, unspecified CKD stage     ICD-10-CM: N18.9  ICD-9-CM: 585.9           Hospital Course:     This is a gentleman with multivessel coronary disease.He was admitted with angina and had a heart catheterization on May 15, 2022 with PCI to the mid circumflex and PCI to the ostial to mid LAD and distal LAD.  On presentation he had strokelike symptoms and MRI of the brain was performed and did not show any strokes.  He was seen by internal medicine for diabetes.  Actos was discontinued.Consider GLP-1 agonist as an out pt. He should follow up with his PCP.    Procedures Performed  Procedure(s):  Left Heart Cath  CORONARY ANGIOGRAPHY  Percutaneous Coronary Intervention  Optical Coherent  Tomography  Stent SAUL coronary       Consults     Date and Time Order Name Status Description    5/16/2022 10:56 AM Inpatient Neurology Consult General Completed     5/15/2022  2:51 PM Inpatient Hospitalist Consult Completed           Pertinent Test Results:   Results from last 7 days   Lab Units 05/17/22  0326 05/16/22  0352 05/15/22  1030   SODIUM mmol/L 135*   < > 127*   POTASSIUM mmol/L 3.6   < > 4.1   CHLORIDE mmol/L 103   < > 94*   CO2 mmol/L 24.0   < > 20.8*   BUN mg/dL 19   < > 23   CREATININE mg/dL 1.32*   < > 1.89*   CALCIUM mg/dL 8.4*   < > 8.6   BILIRUBIN mg/dL  --   --  0.3   ALK PHOS U/L  --   --  50   ALT (SGPT) U/L  --   --  28   AST (SGOT) U/L  --   --  27   GLUCOSE mg/dL 76   < > 516*    < > = values in this interval not displayed.     Results from last 7 days   Lab Units 05/15/22  1030   TROPONIN T ng/mL 0.024     Results from last 7 days   Lab Units 05/16/22  0352   WBC 10*3/mm3 6.85   HEMOGLOBIN g/dL 13.3   HEMATOCRIT % 40.6   PLATELETS 10*3/mm3 189             Results from last 7 days   Lab Units 05/16/22  0352   CHOLESTEROL mg/dL 171   TRIGLYCERIDES mg/dL 392*   HDL CHOL mg/dL 19*   LDL CHOL mg/dL 87       Condition on Discharge: stable    Discharge Medications     Discharge Medications      New Medications      Instructions Start Date   aspirin 81 MG EC tablet  Replaces: aspirin 325 MG tablet   81 mg, Oral, Daily      atorvastatin 40 MG tablet  Commonly known as: LIPITOR   40 mg, Oral, Nightly      Lancets misc   Use to test blood glucose up to four times daily as needed. Formulary Compliance Approval. Diagnosis: Type 2 Diabetes - Insulin Dependent      Lantus SoloStar 100 UNIT/ML injection pen  Generic drug: Insulin Glargine   40 Units, Subcutaneous, Nightly, Formulary Compliance Approval      Pen Needles 32G X 4 MM misc   1 each, Subcutaneous, Daily, Formulary Compliance Approval         Changes to Medications      Instructions Start Date   allopurinol 300 MG tablet  Commonly known as:  ZYLOPRIM  What changed: See the new instructions.   300 mg, Oral, Daily   Start Date: May 18, 2022     bisoprolol 5 MG tablet  Commonly known as: ZEBeta  What changed:   · medication strength  · See the new instructions.   5 mg, Oral, Every 24 Hours Scheduled   Start Date: May 18, 2022     clopidogrel 75 MG tablet  Commonly known as: PLAVIX  What changed: See the new instructions.   75 mg, Oral, Daily   Start Date: May 18, 2022     metFORMIN  MG 24 hr tablet  Commonly known as: GLUCOPHAGE-XR  What changed: how much to take   500 mg, Oral, Daily         Continue These Medications      Instructions Start Date   amLODIPine 10 MG tablet  Commonly known as: NORVASC   amlodipine 10 mg tablet daily      citalopram 40 MG tablet  Commonly known as: CeleXA   citalopram 40 mg tablet   Take 1 tablet every day by oral route.      folic acid 1 MG tablet  Commonly known as: FOLVITE   folic acid 1 mg tablet   TAKE ONE TABLET BY MOUTH ONCE DAILY      glimepiride 4 MG tablet  Commonly known as: AMARYL   4 mg, Oral, Every Morning Before Breakfast      isosorbide mononitrate 30 MG 24 hr tablet  Commonly known as: IMDUR   30 mg, Oral, Every Morning   Start Date: May 18, 2022     losartan 100 MG tablet  Commonly known as: COZAAR   TAKE 1 TABLET BY MOUTH EVERY DAY      nitroglycerin 0.4 MG SL tablet  Commonly known as: NITROSTAT   0.4 mg, Sublingual, Every 5 Minutes PRN, Take no more than 3 doses in 15 minutes.      OMEGA 3 500 PO   Oral, 8 Tablets daily          Stop These Medications    aspirin 325 MG tablet  Replaced by: aspirin 81 MG EC tablet     insulin  UNIT/ML injection  Commonly known as: humuLIN N,novoLIN N     NOVOLIN N SC     pioglitazone 45 MG tablet  Commonly known as: ACTOS     sildenafil 20 MG tablet  Commonly known as: REVATIO     simvastatin 80 MG tablet  Commonly known as: ZOCOR            Discharge Diet:   Diet Instructions     Diet: Consistent Carbohydrate, Cardiac      Discharge Diet:  Consistent  Carbohydrate  Cardiac             Activity at Discharge:   Activity Instructions     Activity as Tolerated            Discharge disposition: home    Follow-up Appointments  Future Appointments   Date Time Provider Department Center   5/23/2022 10:00 AM  [facility]   11/9/2022 10:30 AM Fran Giordano Jr., MD [facility]     Additional Instructions for the Follow-ups that You Need to Schedule     Ambulatory Referral to Cardiac Rehab   As directed      Discharge Follow-up with PCP   As directed       Currently Documented PCP:    Jeff Keating MD    PCP Phone Number:    [phone]     Follow Up Details: ASAP for diabetes         Discharge Follow-up with Specified Provider: Dr Giordano our Melony PHILLIPS in 1-2 weeks   As directed      To: Dr Pasquale PHILLIPS in 1-2 weeks               Test Results Pending at Discharge       Onelia Crawford MD, [facility]  05/17/22  11:08 EDT    Time: Discharge 35 min

## 2022-05-17 NOTE — PROGRESS NOTES
"DAILY PROGRESS NOTE  [facility]    Patient Identification:  Name: Fernando Gauthier  Age: 66 y.o.  Sex: male  :  1955  MRN: [id number]         Primary Care Physician: Jeff Keating MD      Subjective  Patient with no acute complaints today.    Objective:  General Appearance:  Comfortable, well-appearing, in no acute distress and not in pain.    Vital signs: (most recent): Blood pressure 133/78, pulse 67, temperature 98.3 °F (36.8 °C), temperature source Oral, resp. rate 16, height 167.6 cm (66\"), weight 81 kg (178 lb 9.2 oz), SpO2 96 %.    Lungs:  Normal effort and normal respiratory rate.  Breath sounds clear to auscultation.    Heart: Normal rate.  Regular rhythm.    Extremities: There is no dependent edema.    Neurological: Patient is alert and oriented to person, place and time.    Skin:  Warm and dry.                Vital signs in last 24 hours:  Temp:  [97.5 °F (36.4 °C)-99.3 °F (37.4 °C)] 98.3 °F (36.8 °C)  Heart Rate:  [67-83] 67  Resp:  [16] 16  BP: (133-169)/(71-80) 133/78    Intake/Output:    Intake/Output Summary (Last 24 hours) at 2022 1135  Last data filed at 2022 0600  Gross per 24 hour   Intake 940 ml   Output --   Net 940 ml         Results from last 7 days   Lab Units 22  0352 05/15/22  1030   WBC 10*3/mm3 6.85 5.86   HEMOGLOBIN g/dL 13.3 13.3   PLATELETS 10*3/mm3 189 178     Results from last 7 days   Lab Units 22  0326 22  0352 05/15/22  1030   SODIUM mmol/L 135* 135* 127*   POTASSIUM mmol/L 3.6 3.5 4.1   CHLORIDE mmol/L 103 104 94*   CO2 mmol/L 24.0 21.0* 20.8*   BUN mg/dL 19 24* 23   CREATININE mg/dL 1.32* 1.72* 1.89*   GLUCOSE mg/dL 76 75 516*   Estimated Creatinine Clearance: 54.3 mL/min (A) (by C-G formula based on SCr of 1.32 mg/dL (H)).  Results from last 7 days   Lab Units 22  0326 22  0352 05/15/22  1030   CALCIUM mg/dL 8.4* 8.6 8.6   ALBUMIN g/dL  --   --  3.50     Results from last 7 days   Lab Units 05/15/22  1030 "   ALBUMIN g/dL 3.50   BILIRUBIN mg/dL 0.3   ALK PHOS U/L 50   AST (SGOT) U/L 27   ALT (SGPT) U/L 28       Assessment:    ST elevation myocardial infarction (STEMI), unspecified artery (HCC)    Diabetes type 2: Horrible control prior to admission with an A1c in excess of 14.  Appears to be associated with very poor compliance.  Brought under control very easily with only moderate doses of insulin here.  Received Lantus last night.  Blood sugar little low side this morning but was asymptomatic and did not require any specific treatment.  He can be discharged on a single nightly dose of Lantus in the decrease his dose just a little.  He desperately needs to start monitoring his blood sugars at home.  The dose of Lantus required to keep him under control is low enough that he could very easily go back on oral medications which I will defer to his primary care physician.  Creatinine level is improved and he could even resume the Glucophage with monitoring of the renal function.  He may also do well with a GLP-1 agonist.  I will defer this decision to his primary care physician.  Stable from this point of view however and okay for discharge from my point of view.      Plan:  Please see above.  Discussed with patient.  Discussed with daughter at bedside.  Discussed with RN.    Gee Zafar MD  5/17/2022  11:35 EDT

## 2022-05-17 NOTE — PROGRESS NOTES
[facility] Clinical Pharmacy Services: National Cardiology Data Registry (NCDR) Medication Review    Fernando Gauthier is s/p PCI with drug-eluding stent placement for unstable angina . Pharmacy to review discharge medications to make sure appropriate medications have been prescribed.    Patient has been discharged on the following:  Aspirin: 81 mg qday  High Intensity Statin: atorvastatin 40 mg qday  Beta-blocker: bisoprolol 5 mg qday   P2Y12 Inhibitor: plavix 75 mg qday  LVEF <40: last EF was 59%    These medications meet the requirements for NCDR discharge medication for chest pain and MI.    Precious Sheldon, PharmD  Clinical Pharmacist

## 2022-05-18 ENCOUNTER — TELEPHONE (OUTPATIENT)
Dept: CARDIAC REHAB | Facility: HOSPITAL | Age: 67
End: 2022-05-18

## 2022-05-18 ENCOUNTER — READMISSION MANAGEMENT (OUTPATIENT)
Dept: CALL CENTER | Facility: HOSPITAL | Age: 67
End: 2022-05-18

## 2022-05-18 NOTE — OUTREACH NOTE
Prep Survey    Flowsheet Row Responses   [facility] facility patient discharged from? [place]   Is LACE score < 7 ? No   Emergency Room discharge w/ pulse ox? No   Eligibility Readm Mgmt   Discharge diagnosis CARDIAC CATHETERIZATION  ST elevation myocardial infarction COVID19   Does the patient have one of the following disease processes/diagnoses(primary or secondary)? COVID-19   Does the patient have Home health ordered? No   Is there a DME ordered? No   Prep survey completed? Yes          LOLITA HUMPHREYS - Registered Nurse

## 2022-05-18 NOTE — OUTREACH NOTE
COVID-19 Week 1 Survey    Flowsheet Row Responses   [facility] patient discharged from? [place]   Does the patient have one of the following disease processes/diagnoses(primary or secondary)? COVID-19   COVID-19 underlying condition? None   Call Number Call 1   Week 1 Call successful? Yes   Call start time 1250   Call end time 1253   Discharge diagnosis CARDIAC CATHETERIZATION  ST elevation myocardial infarction COVID19   Meds reviewed with patient/caregiver? Yes   Is the patient having any side effects they believe may be caused by any medication additions or changes? No   Does the patient have all medications ordered at discharge? Yes   Is the patient taking all medications as directed (includes completed medication regime)? Yes   Does the patient have a primary care provider?  Yes   Does the patient have an appointment with their PCP or specialist within 7 days of discharge? No   What is preventing the patient from scheduling follow up appointments within 7 days of discharge? Haven't had time   Nursing Interventions Educated patient on importance of making appointment   Has the patient kept scheduled appointments due by today? N/A   Has home health visited the patient within 72 hours of discharge? N/A   Psychosocial issues? No   Did the patient receive a copy of their discharge instructions? Yes   Did the patient receive a copy of COVID-19 specific instructions? Yes   Nursing interventions Reviewed instructions with patient   What is the patient's perception of their health status since discharge? Improving   Does the patient have any of the following symptoms? Cough   Nursing Interventions Nurse provided patient education   Pulse Ox monitoring None   Is the patient/caregiver able to teach back steps to recovery at home? Set small, achievable goals for return to baseline health, Rest and rebuild strength, gradually increase activity, Eat a well-balance diet   If the patient is a current smoker, are they  able to teach back resources for cessation? Smoking cessation medications   Is the patient/caregiver able to teach back the hierarchy of who to call/visit for symptoms/problems? PCP, Specialist, Home health nurse, Urgent Care, ED, 911 Yes   COVID-19 call completed? Yes          DIRK SAHA - Registered Nurse

## 2022-05-18 NOTE — TELEPHONE ENCOUNTER
Patient has declined cardiac rehab, states it has been offered to him before but he is not interested at this time.

## 2022-05-19 ENCOUNTER — READMISSION MANAGEMENT (OUTPATIENT)
Dept: CALL CENTER | Facility: HOSPITAL | Age: 67
End: 2022-05-19

## 2022-05-19 NOTE — OUTREACH NOTE
COVID-19 Week 1 Survey    Flowsheet Row Responses   [facility] [place] patient discharged from? [place]   Does the patient have one of the following disease processes/diagnoses(primary or secondary)? COVID-19   COVID-19 underlying condition? AMI   Call Number Call 2   Week 1 Call successful? Yes   Call start time 1342   Call end time 1355   Discharge diagnosis CARDIAC CATHETERIZATION  ST elevation myocardial infarction COVID19   Is patient permission given to speak with other caregiver? No   Meds reviewed with patient/caregiver? Yes   Does the patient have all medications ordered at discharge? Yes   Is the patient taking all medications as directed (includes completed medication regime)? Yes   Comments regarding appointments Hospital Follow Up with [facility] Wednesday May 25, 2022 1:00 PM   Does the patient have a primary care provider?  Yes   Comments regarding PCP PCP Dr Keating. No PCP appt in place.    Does the patient have an appointment with their PCP or specialist within 7 days of discharge? No   What is preventing the patient from scheduling follow up appointments within 7 days of discharge? Haven't had time   Nursing Interventions Educated patient on importance of making appointment, Advised patient to make appointment   Has the patient kept scheduled appointments due by today? N/A   Has home health visited the patient within 72 hours of discharge? N/A   Psychosocial issues? No   Comments Patient monitoring home blood sugar. Reports good morning readings but high readings throughout the day. Advised patient to contact PCP for appt to discuss meds/diabetes management.    Did the patient receive a copy of their discharge instructions? Yes   Did the patient receive a copy of COVID-19 specific instructions? Yes   Nursing interventions Reviewed instructions with patient   What is the patient's perception of their health status since discharge? Improving   Does the patient have any of the  following symptoms? Cough   Nursing Interventions Nurse provided patient education   Pulse Ox monitoring None   Is the patient/caregiver able to teach back steps to recovery at home? Set small, achievable goals for return to baseline health, Rest and rebuild strength, gradually increase activity   If the patient is a current smoker, are they able to teach back resources for cessation? --  [Patient reports light smoker, trying to quit. ]   Is the patient/caregiver able to teach back the hierarchy of who to call/visit for symptoms/problems? PCP, Specialist, Home health nurse, Urgent Care, ED, 911 Yes   Is the patient/caregiver able to teach back ways to prevent a second heart attack: Take medications, Follow up with MD, Manage risk factors   Is the patient/caregiver able to teach back lifestyle changes to help prevent MIs Managing diabetes, Heart healthy diet, Quit smoking, Regular exercise as approved by provider   Is the patient/caregiver able to teach back signs and symptoms of when to call for help immediately: Sudden chest discomfort, Sudden discomfort in arms, back, neck or jaw, Shortness of breath at any time, Sudden sweating or clammy skin, Nausea or vomiting, Dizziness or lightheadedness, Irregular or rapid heart rate   Nursing interventions Nurse provided patient education   COVID-19 call completed? Yes   Wrap up additional comments Denies further questions or needs today.           LOLITA STEIN - Registered Nurse

## 2022-05-20 ENCOUNTER — READMISSION MANAGEMENT (OUTPATIENT)
Dept: CALL CENTER | Facility: HOSPITAL | Age: 67
End: 2022-05-20

## 2022-05-23 ENCOUNTER — HOSPITAL ENCOUNTER (OUTPATIENT)
Dept: NEUROLOGY | Facility: HOSPITAL | Age: 67
Discharge: HOME | End: 2022-05-23
Admitting: PSYCHIATRY & NEUROLOGY

## 2022-05-23 ENCOUNTER — READMISSION MANAGEMENT (OUTPATIENT)
Dept: CALL CENTER | Facility: HOSPITAL | Age: 67
End: 2022-05-23

## 2022-05-23 DIAGNOSIS — R56.9 SEIZURE: ICD-10-CM

## 2022-05-23 PROCEDURE — 95816 EEG AWAKE AND DROWSY: CPT | Performed by: PSYCHIATRY & NEUROLOGY

## 2022-05-23 PROCEDURE — 95816 EEG AWAKE AND DROWSY: CPT

## 2022-05-23 NOTE — OUTREACH NOTE
"COVID-19 Week 2 Survey    Flowsheet Row Responses   [facility] patient discharged from? [place]   Does the patient have one of the following disease processes/diagnoses(primary or secondary)? COVID-19   COVID-19 underlying condition? AMI   Call Number Call 1   COVID-19 Week 2: Call 1 attempt successful? Yes   Call start time 1350   Call end time 1354   Discharge diagnosis CARDIAC CATHETERIZATION  ST elevation myocardial infarction COVID19   Person spoke with today (if not patient) and relationship Marilela-daughter    Meds reviewed with patient/caregiver? Yes   Is the patient taking all medications as directed (includes completed medication regime)? Yes   Comments regarding appointments EEG is on 5/23/22,  Cards appt is on 5/25/22   Has the patient kept scheduled appointments due by today? N/A   What is the patient's perception of their health status since discharge? Improving   Does the patient have any of the following symptoms? None  [fatigue ]   Pulse Ox monitoring None   Is the patient/caregiver able to teach back steps to recovery at home? Eat a well-balance diet   COVID-19 call completed? Yes   Revoked No further contact(revokes)-requires comment   Is the patient interested in additional calls from an ambulatory ?  NOTE:  applies to high risk patients requiring additional follow-up. No   Graduated/Revoked comments Daughter reports, \"I think we're ok at this time to be honest\"          RIYA HUMPHREYS - Registered Nurse  "

## 2022-05-25 ENCOUNTER — OFFICE VISIT (OUTPATIENT)
Dept: CARDIOLOGY | Facility: CLINIC | Age: 67
End: 2022-05-25

## 2022-05-25 VITALS
WEIGHT: 175 LBS | HEART RATE: 56 BPM | BODY MASS INDEX: 28.12 KG/M2 | DIASTOLIC BLOOD PRESSURE: 64 MMHG | SYSTOLIC BLOOD PRESSURE: 120 MMHG | HEIGHT: 66 IN

## 2022-05-25 DIAGNOSIS — Z95.5 S/P DRUG ELUTING CORONARY STENT PLACEMENT: ICD-10-CM

## 2022-05-25 DIAGNOSIS — R68.89 ABNORMAL ANKLE BRACHIAL INDEX (ABI): ICD-10-CM

## 2022-05-25 DIAGNOSIS — Z79.4 TYPE 2 DIABETES MELLITUS WITH OTHER CIRCULATORY COMPLICATION, WITH LONG-TERM CURRENT USE OF INSULIN: ICD-10-CM

## 2022-05-25 DIAGNOSIS — E11.59 TYPE 2 DIABETES MELLITUS WITH OTHER CIRCULATORY COMPLICATION, WITH LONG-TERM CURRENT USE OF INSULIN: ICD-10-CM

## 2022-05-25 DIAGNOSIS — I25.2 HISTORY OF ST ELEVATION MYOCARDIAL INFARCTION (STEMI): Primary | ICD-10-CM

## 2022-05-25 DIAGNOSIS — I25.10 CORONARY ARTERY DISEASE INVOLVING NATIVE CORONARY ARTERY OF NATIVE HEART WITHOUT ANGINA PECTORIS: ICD-10-CM

## 2022-05-25 DIAGNOSIS — I73.9 PAD (PERIPHERAL ARTERY DISEASE): ICD-10-CM

## 2022-05-25 PROCEDURE — 93000 ELECTROCARDIOGRAM COMPLETE: CPT | Performed by: NURSE PRACTITIONER

## 2022-05-25 PROCEDURE — 99214 OFFICE O/P EST MOD 30 MIN: CPT | Performed by: NURSE PRACTITIONER

## 2022-05-25 RX ORDER — NITROGLYCERIN 0.4 MG/1
0.4 TABLET SUBLINGUAL
Qty: 30 TABLET | Refills: 1 | Status: SHIPPED | OUTPATIENT
Start: 2022-05-25

## 2022-05-25 NOTE — PROGRESS NOTES
Date of Office Visit: 22  Encounter Provider: ALAN Apodaca  Place of Service: [facility] CARDIOLOGY  Patient Name: Fernando Gauthier  :1955    Chief Complaint   Patient presents with   • Coronary artery disease involving native coronary artery of   • Hypertension   • Hyperlipidemia   • Mitral Regurgitation   • Peripheral Vascular Disease   • Follow-up   :     HPI: Fernando Gauthier is a 66 y.o. male  with  hypertension, hyperlipidemia, diabetes mellitus, mitral regurgitation, coronary artery disease status post multiple coronary stent placement, myocardial infarction, peripheral artery disease, and bilateral carotid artery plaque.  He is a patient of Dr. Osullivan.  I am seeing him in follow up today and have reviewed his medical record.     He was found to have severe disease of the proximal, mid and the distal LAD as well as disease of the marginal branch.  He had angioplasty to multiple areas of the LAD back in 2001 and then recurrent chest discomfort 2004.  He had angioplasty and stent placed to the marginal.  Then 2008 had recurrent cardiac symptoms perfusion stress test was unremarkable proceeded with cardiac catheterization where he had angioplasty stent placement of the circumflex as well as to the proximal LAD.  Then in  he presented with progressive dyspnea and had a repeat cardiac catheterization showed normal left ventricular systolic function with total occlusion of the right coronary artery not amenable to angioplasty of the vessels were free of obstructive disease and since the right coronary artery was nondominant he was treated medically.  Follow-up Echocardiogram in  showed normal left ventricular systolic function, moderately dilated left atrium no significant valvular disease.  He had a perfusion stress test at that time which showed an ejection fraction 43% with no ischemia. In 2018,  he also had an echocardiogram  which showed normal left ventricular systolic function with an EF of 59%, grade 1 diastolic dysfunction with mild mitral regurgitation.     In 10/3/2019, he complained of intermittent chest tightness associated with nausea diaphoresis and shortness of breath.  He had cardiac catheterization completed 10/7/2019 which showed a very short and normal left main, 20-30% LAD disease with a proximal stent that was widely patent mid LAD stent which was widely patent and another mid to distal LAD stent which was widely patent.  There is a 30% lesion.  Apical LAD stent was widely patent.  There was a 40% area but then a very apical LAD stent with 20% diffuse restenosis.  No ramus intermedius.  The circumflex had a stent in the proximal portion which was widely patent.  There was a large stent in the midportion with 50% restenosis and a very small 1 to with 40-50% disease too small to intervene.  There was an FFR and RFR on the mid circumflex stent which was not hemodynamically significant.  The right coronary artery was small nondominant with 100% occluded right coronary artery with collateral flow.  Imdur was added to his regimen.  He later was found to have exercise-induced claudication particularly in the right lower extremity.      He then developed brain fog and left-sided numbness and tingling with increased shortness of breath and chest pressure while out on the golf course with his grandson who is a .  His grand son called EMS and he was brought emergently to [facility].  Initial EKG showed evidence of ST elevation compatible with STEMI in May 2022 and had significant lesion of the mid LAD and mid circumflex.  He received PCI of the mid circumflex with drug-eluting stent as well as PCI of the ostial to mid LAD with overlapping stents x2 and successful PCI of the mid to distal LAD with 2 additional stents which were postdilated.  Dual antiplatelet therapy was recommended lifelong    He presents  "today for reassessment.  He stays active and does not plan to participate in cardiac rehab.  He denies chest pain tightness or pressure.  He has no numbness and tingling on his radial cath site.  No edema.  No bleeding issues with low-dose aspirin and clopidogrel.    No Known Allergies        Family and social history reviewed.     ROS  All other systems were reviewed and are negative          Objective:     Vitals:    05/25/22 1321   BP: 120/64   BP Location: Left arm   Patient Position: Sitting   Pulse: 56   Weight: 79.4 kg (175 lb)   Height: 167.6 cm (66\")     Body mass index is 28.25 kg/m².    PHYSICAL EXAM:  Pulmonary:      Effort: Pulmonary effort is normal.      Breath sounds: Normal breath sounds.   Cardiovascular:      Normal rate. Regular rhythm.           ECG 12 Lead    Date/Time: 5/25/2022 5:22 PM  Performed by: Melony Singh APRN  Authorized by: Melony Singh APRN   Comparison: compared with previous ECG   Similar to previous ECG  Rhythm: sinus rhythm  Rate: normal  T inversion: I and aVL  T flattening: V5, V4 and V6  QRS axis: normal  Comments: Improved T wave inversion noted              Current Outpatient Medications   Medication Sig Dispense Refill   • allopurinol (ZYLOPRIM) 300 MG tablet Take 1 tablet by mouth Daily. 30 tablet 0   • amLODIPine (NORVASC) 10 MG tablet amlodipine 10 mg tablet daily     • atorvastatin (LIPITOR) 40 MG tablet Take 1 tablet by mouth Every Night. 90 tablet 0   • bisoprolol (ZEBeta) 5 MG tablet Take 1 tablet by mouth Daily. 90 tablet 0   • Blood Glucose Monitoring Suppl (OneTouch Verio) w/Device kit 1 each Take As Directed. Use to test blood sugar up to 4 times daily as needed 1 kit 0   • citalopram (CeleXA) 40 MG tablet citalopram 40 mg tablet   Take 1 tablet every day by oral route.     • clopidogrel (PLAVIX) 75 MG tablet Take 1 tablet by mouth Daily. 90 tablet 3   • folic acid (FOLVITE) 1 MG tablet folic acid 1 mg tablet   TAKE ONE TABLET BY MOUTH ONCE DAILY     • " glucose blood (OneTouch Verio) test strip Use to test blood sugar up to 4 times daily as needed 100 each 12   • Insulin Glargine (Lantus SoloStar) 100 UNIT/ML injection pen Inject 35 Units under the skin into the appropriate area as directed Every Night 15 mL 0   • Insulin Pen Needle (Pen Needles) 32G X 4 MM misc Inject 1 each under the skin into the appropriate area as directed Daily. Formulary Compliance Approval 100 each 0   • isosorbide mononitrate (IMDUR) 30 MG 24 hr tablet Take 1 tablet by mouth Every Morning. 90 tablet 0   • Lancets misc Use to test blood glucose up to four times daily as needed. Formulary Compliance Approval. Diagnosis: Type 2 Diabetes - Insulin Dependent 100 each 0   • losartan (COZAAR) 100 MG tablet TAKE 1 TABLET BY MOUTH EVERY DAY 90 tablet 3   • nitroglycerin (NITROSTAT) 0.4 MG SL tablet Place 1 tablet under the tongue Every 5 (Five) Minutes As Needed for Chest Pain. Take no more than 3 doses in 15 minutes. 30 tablet 1   • Omega-3 Fatty Acids (OMEGA 3 500 PO) Take  by mouth. 8 Tablets daily     • aspirin (aspirin) 81 MG EC tablet Take 1 tablet by mouth Daily. 30 tablet 6     No current facility-administered medications for this visit.     Assessment:       Diagnosis Plan   1. History of ST elevation myocardial infarction (STEMI)  Adult Transthoracic Echo Complete W/ Cont if Necessary Per Protocol   2. S/P drug eluting coronary stent placement  Adult Transthoracic Echo Complete W/ Cont if Necessary Per Protocol   3. Coronary artery disease involving native coronary artery of native heart without angina pectoris     4. Abnormal ankle brachial index (SYED)     5. PAD (peripheral artery disease) ([facility])     6. Type 2 diabetes mellitus with other circulatory complication, with long-term current use of insulin ([facility])          Orders Placed This Encounter   Procedures   • Adult Transthoracic Echo Complete W/ Cont if Necessary Per Protocol     Standing Status:   Future     Standing Expiration Date:    5/25/2023     Order Specific Question:   Reason for exam?     Answer:   Other Reasons     Order Specific Question:   Other reason(s)?     Answer:   Myocardial Ischemia/Infarction     Order Specific Question:   Myocardial Ischemia/Infarction specification?     Answer:   Ischemic Equivalent         Plan:           1. 66 year old gentleman history of severe coronary artery disease.  Multiple interventions including angioplasty, bare metal stent placements, and drug eluding stent placements to the left anterior descending and circumflex.  Last cardiac catheterization in 2010 showed normal left ventricular systolic function in significant disease left anterior descending and circumflex with no evidence of restenosis but total occlusion of the nondominant right coronary artery.  Echocardiogram in 2012 normal LV systolic function.  Then recurrent symptoms repeat catheterization October 2019 all stents widely patent.  Insignificant FFR of mid circumflex stent.  Right coronary artery 100% occluded proximally with collateral flow.  Treated medically then new sudden symptoms, STEMI May 2022 status post PCI of the mid circumflex and PCI of the ostial to mid LAD as well as the distal LAD.  He is now on atorvastatin and is to continue that with dual antiplatelet therapy lifelong  -He will return for echo to reassess left ventricular systolic function and wall motion  2.  Mitral insufficiency last echocardiogram September 2018-we will repeat echocardiogram given recent STEMI  3.  Hypertension blood pressure appears well controlled today  4.  Mitral regurgitation-mild on echo September 2018-as above  5.  Bilateral carotid plaque no stenosis on duplex May 2021  6.  Hyperlipidemia on target dose simvastatin target LDL 70 or less  7.  Diabetes mellitus on insulin followed by PCP  8.  History of renal cancer s/p partial left nephrectomy-follows with nephrology with Dr. Regalado  9. Tobacco use-Unfortunately, he continues to smoke 1/4  pack of cigarettes daily.  He does have desire to quit but no set quit date.  I advised that he does   10.  Peripheral arterial disease.  He has exercise-induced claudication in the right lower extremity.  Abnormal CTA with runoff June 2021  11.  Erectile dysfunction.   12.  Enlarged mesenteric lymph nodes noted on CT abdomen June 2021.  6-month follow-up was suggested.  I have set a reminder to call him next month to make arrangement for this                  It has been a pleasure to participate in this patient's care.      Thank you,  ALAN Apodaca      **I used Dragon to dictate this note:**

## 2022-06-10 ENCOUNTER — HOSPITAL ENCOUNTER (OUTPATIENT)
Dept: CARDIOLOGY | Facility: HOSPITAL | Age: 67
Discharge: HOME | End: 2022-06-10
Admitting: NURSE PRACTITIONER

## 2022-06-10 VITALS
WEIGHT: 175 LBS | HEIGHT: 66 IN | OXYGEN SATURATION: 98 % | SYSTOLIC BLOOD PRESSURE: 160 MMHG | HEART RATE: 63 BPM | BODY MASS INDEX: 28.12 KG/M2 | DIASTOLIC BLOOD PRESSURE: 74 MMHG

## 2022-06-10 DIAGNOSIS — I25.2 HISTORY OF ST ELEVATION MYOCARDIAL INFARCTION (STEMI): ICD-10-CM

## 2022-06-10 DIAGNOSIS — Z95.5 S/P DRUG ELUTING CORONARY STENT PLACEMENT: ICD-10-CM

## 2022-06-10 LAB
AORTIC ARCH: 2.6 CM
ASCENDING AORTA: 3.4 CM
BH CV ECHO MEAS - ACS: 1.91 CM
BH CV ECHO MEAS - AO MAX PG: 11 MMHG
BH CV ECHO MEAS - AO MEAN PG: 6.3 MMHG
BH CV ECHO MEAS - AO ROOT DIAM: 2.6 CM
BH CV ECHO MEAS - AO V2 MAX: 165.6 CM/SEC
BH CV ECHO MEAS - AO V2 VTI: 41.7 CM
BH CV ECHO MEAS - AVA(I,D): 1.76 CM2
BH CV ECHO MEAS - EDV(CUBED): 103.9 ML
BH CV ECHO MEAS - EDV(MOD-SP2): 90 ML
BH CV ECHO MEAS - EDV(MOD-SP4): 86 ML
BH CV ECHO MEAS - EF(MOD-BP): 60 %
BH CV ECHO MEAS - EF(MOD-SP2): 61.1 %
BH CV ECHO MEAS - EF(MOD-SP4): 59.3 %
BH CV ECHO MEAS - EF_3D-VOL: 53 %
BH CV ECHO MEAS - ESV(CUBED): 26.8 ML
BH CV ECHO MEAS - ESV(MOD-SP2): 35 ML
BH CV ECHO MEAS - ESV(MOD-SP4): 35 ML
BH CV ECHO MEAS - FS: 36.3 %
BH CV ECHO MEAS - IVS/LVPW: 1.09 CM
BH CV ECHO MEAS - IVSD: 1.18 CM
BH CV ECHO MEAS - LA 3D VOL INDEX: 34
BH CV ECHO MEAS - LAT PEAK E' VEL: 5.7 CM/SEC
BH CV ECHO MEAS - LV DIASTOLIC VOL/BSA (35-75): 45.5 CM2
BH CV ECHO MEAS - LV MASS(C)D: 194.9 GRAMS
BH CV ECHO MEAS - LV MAX PG: 4 MMHG
BH CV ECHO MEAS - LV MEAN PG: 2.19 MMHG
BH CV ECHO MEAS - LV SYSTOLIC VOL/BSA (12-30): 18.5 CM2
BH CV ECHO MEAS - LV V1 MAX: 99.4 CM/SEC
BH CV ECHO MEAS - LV V1 VTI: 23.4 CM
BH CV ECHO MEAS - LVIDD: 4.7 CM
BH CV ECHO MEAS - LVIDS: 3 CM
BH CV ECHO MEAS - LVOT AREA: 3.1 CM2
BH CV ECHO MEAS - LVOT DIAM: 2 CM
BH CV ECHO MEAS - LVPWD: 1.08 CM
BH CV ECHO MEAS - MED PEAK E' VEL: 4.5 CM/SEC
BH CV ECHO MEAS - MV A DUR: 0.13 SEC
BH CV ECHO MEAS - MV A MAX VEL: 140.7 CM/SEC
BH CV ECHO MEAS - MV DEC SLOPE: 517.5 CM/SEC2
BH CV ECHO MEAS - MV DEC TIME: 0.23 MSEC
BH CV ECHO MEAS - MV E MAX VEL: 109 CM/SEC
BH CV ECHO MEAS - MV E/A: 0.77
BH CV ECHO MEAS - MV MAX PG: 8.9 MMHG
BH CV ECHO MEAS - MV MEAN PG: 3.6 MMHG
BH CV ECHO MEAS - MV P1/2T: 74.5 MSEC
BH CV ECHO MEAS - MV V2 VTI: 41 CM
BH CV ECHO MEAS - MVA(P1/2T): 3 CM2
BH CV ECHO MEAS - MVA(VTI): 1.79 CM2
BH CV ECHO MEAS - PA ACC TIME: 0.09 SEC
BH CV ECHO MEAS - PA PR(ACCEL): 37.8 MMHG
BH CV ECHO MEAS - PA V2 MAX: 108.6 CM/SEC
BH CV ECHO MEAS - PULM A REVS DUR: 0.14 SEC
BH CV ECHO MEAS - PULM A REVS VEL: 27.6 CM/SEC
BH CV ECHO MEAS - PULM DIAS VEL: 67.3 CM/SEC
BH CV ECHO MEAS - PULM S/D: 1.2
BH CV ECHO MEAS - PULM SYS VEL: 80.9 CM/SEC
BH CV ECHO MEAS - QP/QS: 0.92
BH CV ECHO MEAS - RAP SYSTOLE: 3 MMHG
BH CV ECHO MEAS - RV MAX PG: 2.6 MMHG
BH CV ECHO MEAS - RV V1 MAX: 80.1 CM/SEC
BH CV ECHO MEAS - RV V1 VTI: 17.7 CM
BH CV ECHO MEAS - RVOT DIAM: 2.2 CM
BH CV ECHO MEAS - RVSP: 34.6 MMHG
BH CV ECHO MEAS - SI(MOD-SP2): 29.1 ML/M2
BH CV ECHO MEAS - SI(MOD-SP4): 27 ML/M2
BH CV ECHO MEAS - SUP REN AO DIAM: 1.9 CM
BH CV ECHO MEAS - SV(LVOT): 73.4 ML
BH CV ECHO MEAS - SV(MOD-SP2): 55 ML
BH CV ECHO MEAS - SV(MOD-SP4): 51 ML
BH CV ECHO MEAS - SV(RVOT): 67.2 ML
BH CV ECHO MEAS - TAPSE (>1.6): 2.07 CM
BH CV ECHO MEAS - TR MAX PG: 31.6 MMHG
BH CV ECHO MEAS - TR MAX VEL: 281 CM/SEC
BH CV ECHO MEASUREMENTS AVERAGE E/E' RATIO: 21.37
BH CV VAS BP RIGHT ARM: NORMAL MMHG
BH CV XLRA - RV BASE: 3 CM
BH CV XLRA - RV LENGTH: 8.5 CM
BH CV XLRA - RV MID: 2.8 CM
BH CV XLRA - TDI S': 10.7 CM/SEC
LEFT ATRIUM VOLUME INDEX: 28 ML/M2
LV EF 2D ECHO EST: 60 %
MAXIMAL PREDICTED HEART RATE: 153 BPM
SINUS: 3.1 CM
STJ: 3.1 CM
STRESS TARGET HR: 130 BPM

## 2022-06-10 PROCEDURE — 93306 TTE W/DOPPLER COMPLETE: CPT

## 2022-06-10 PROCEDURE — 93306 TTE W/DOPPLER COMPLETE: CPT | Performed by: INTERNAL MEDICINE

## 2022-06-13 ENCOUNTER — TELEPHONE (OUTPATIENT)
Dept: CARDIOLOGY | Facility: CLINIC | Age: 67
End: 2022-06-13

## 2022-06-13 NOTE — TELEPHONE ENCOUNTER
Fernando Gauthier notified of results and recommendations; patient verbalized understanding.    Beverley Munguia RN  [facility] Triage  06/13/22  13:28 EDT

## 2022-06-13 NOTE — TELEPHONE ENCOUNTER
Patient returned the call , the voicemail that was left was cut off ./DG      Patient can be reached [phone]

## 2022-06-20 RX ORDER — LOSARTAN POTASSIUM 100 MG/1
TABLET ORAL
Qty: 90 TABLET | Refills: 3 | Status: SHIPPED | OUTPATIENT
Start: 2022-06-20

## 2022-06-24 RX ORDER — ATORVASTATIN CALCIUM 40 MG/1
40 TABLET, FILM COATED ORAL NIGHTLY
Qty: 90 TABLET | Refills: 3 | Status: SHIPPED | OUTPATIENT
Start: 2022-06-24 | End: 2022-11-09

## 2022-06-24 RX ORDER — BISOPROLOL FUMARATE 5 MG/1
5 TABLET, FILM COATED ORAL
Qty: 90 TABLET | Refills: 0 | Status: SHIPPED | OUTPATIENT
Start: 2022-06-24

## 2022-06-24 NOTE — TELEPHONE ENCOUNTER
Rx Refill Note  Requested Prescriptions     Pending Prescriptions Disp Refills   • bisoprolol (ZEBeta) 5 MG tablet 90 tablet 0     Sig: Take 1 tablet by mouth Daily.      Last office visit with prescribing clinician: 5/25/2022      Next office visit with prescribing clinician: 7/13/2022            Shawnee Longoria MA  06/24/22, 09:19 EDT

## 2022-06-24 NOTE — TELEPHONE ENCOUNTER
Rx Refill Note  Requested Prescriptions     Pending Prescriptions Disp Refills   • atorvastatin (LIPITOR) 40 MG tablet 90 tablet 3     Sig: Take 1 tablet by mouth Every Night.     Signed Prescriptions Disp Refills   • bisoprolol (ZEBeta) 5 MG tablet 90 tablet 0     Sig: Take 1 tablet by mouth Daily.     Authorizing Provider: PALMA REYES     Ordering User: OCTAVIA LONGORIA      Last office visit with prescribing clinician: 5/25/2022      Next office visit with prescribing clinician: 7/13/2022            Octavia Longoria MA  06/24/22, 09:21 EDT

## 2022-07-13 ENCOUNTER — OFFICE VISIT (OUTPATIENT)
Dept: CARDIOLOGY | Facility: CLINIC | Age: 67
End: 2022-07-13

## 2022-07-13 VITALS
SYSTOLIC BLOOD PRESSURE: 150 MMHG | HEIGHT: 66 IN | BODY MASS INDEX: 28.28 KG/M2 | DIASTOLIC BLOOD PRESSURE: 70 MMHG | WEIGHT: 176 LBS | HEART RATE: 61 BPM | OXYGEN SATURATION: 97 %

## 2022-07-13 DIAGNOSIS — I25.10 CORONARY ARTERY DISEASE INVOLVING NATIVE CORONARY ARTERY OF NATIVE HEART WITHOUT ANGINA PECTORIS: Primary | ICD-10-CM

## 2022-07-13 DIAGNOSIS — E78.5 HYPERLIPIDEMIA LDL GOAL <70: ICD-10-CM

## 2022-07-13 DIAGNOSIS — Z79.4 TYPE 2 DIABETES MELLITUS WITH OTHER CIRCULATORY COMPLICATION, WITH LONG-TERM CURRENT USE OF INSULIN: ICD-10-CM

## 2022-07-13 DIAGNOSIS — Z95.5 S/P DRUG ELUTING CORONARY STENT PLACEMENT: ICD-10-CM

## 2022-07-13 DIAGNOSIS — I73.9 PVD (PERIPHERAL VASCULAR DISEASE) WITH CLAUDICATION: ICD-10-CM

## 2022-07-13 DIAGNOSIS — E11.59 TYPE 2 DIABETES MELLITUS WITH OTHER CIRCULATORY COMPLICATION, WITH LONG-TERM CURRENT USE OF INSULIN: ICD-10-CM

## 2022-07-13 PROCEDURE — 99214 OFFICE O/P EST MOD 30 MIN: CPT | Performed by: NURSE PRACTITIONER

## 2022-07-13 NOTE — PROGRESS NOTES
Date of Office Visit: 22  Encounter Provider: ALAN Apodaca  Place of Service: [facility] CARDIOLOGY  Patient Name: Fernando Gauthier  :1955    Chief Complaint   Patient presents with   • Coronary artery disease involving native coronary artery of   • Hyperlipidemia   • Hypertension   • Follow-up   • History of ST elevation myocardial infarction    :     HPI: Fernando Gauthier is a 66 y.o. male  with  hypertension, hyperlipidemia, diabetes mellitus, mitral regurgitation, coronary artery disease status post multiple coronary stent placement, myocardial infarction, peripheral artery disease, and bilateral carotid artery plaque.  He is a patient of Dr. Osullivan.  I am seeing him in follow up today and have reviewed his medical record.     He was found to have severe disease of the proximal, mid and the distal LAD as well as disease of the marginal branch.  He had angioplasty to multiple areas of the LAD back in 2001 and then recurrent chest discomfort 2004.  He had angioplasty and stent placed to the marginal.  Then 2008 had recurrent cardiac symptoms perfusion stress test was unremarkable proceeded with cardiac catheterization where he had angioplasty stent placement of the circumflex as well as to the proximal LAD.  Then in  he presented with progressive dyspnea and had a repeat cardiac catheterization showed normal left ventricular systolic function with total occlusion of the right coronary artery not amenable to angioplasty of the vessels were free of obstructive disease and since the right coronary artery was nondominant he was treated medically.  He had a nonischemic stress test in .     In 10/3/2019, he complained of intermittent chest tightness associated with nausea diaphoresis and shortness of breath.  He had cardiac catheterization completed 10/7/2019 which showed a very short and normal left main, 20-30% LAD disease with a proximal stent  that was widely patent mid LAD stent which was widely patent and another mid to distal LAD stent which was widely patent.  There is a 30% lesion.  Apical LAD stent was widely patent.  There was a 40% area but then a very apical LAD stent with 20% diffuse restenosis.  No ramus intermedius.  The circumflex had a stent in the proximal portion which was widely patent.  There was a large stent in the midportion with 50% restenosis and a very small 1 to with 40-50% disease too small to intervene.  There was an FFR and RFR on the mid circumflex stent which was not hemodynamically significant.  The right coronary artery was small nondominant with 100% occluded right coronary artery with collateral flow.  Imdur was added to his regimen.  He later was found to have exercise-induced claudication particularly in the right lower extremity.        He then developed brain fog and left-sided numbness and tingling with increased shortness of breath and chest pressure while out on the golf course with his grandson who is a .  His grand son called EMS and he was brought emergently to [facility].  Initial EKG showed evidence of ST elevation compatible with STEMI in May 2022 and had significant lesion of the mid LAD and mid circumflex.  He received PCI of the mid circumflex with drug-eluting stent as well as PCI of the ostial to mid LAD with overlapping stents x2 and successful PCI of the mid to distal LAD with 2 additional stents which were postdilated.  Dual antiplatelet therapy was recommended lifelong.  Follow-up echo June 2022 showed normal left ventricular systolic function and normal wall motion postprocedure.     He presents today and Has been feeling well. He continues to stay he has more energy since his stents were placed. He's been doing gardening and managing his 3 acres with a ride mower. He also golfss on occasion. He is working with his PCP to help lower his hemoglobin A1C. No bleeding  "issues.      Allergies   Allergen Reactions   • Metformin Diarrhea           Family and social history reviewed.     ROS  All other systems were reviewed and are negative          Objective:     Vitals:    07/13/22 0914   BP: 150/70   BP Location: Left arm   Patient Position: Sitting   Pulse: 61   SpO2: 97%   Weight: 79.8 kg (176 lb)   Height: 167.6 cm (66\")     Body mass index is 28.41 kg/m².    PHYSICAL EXAM:  Pulmonary:      Effort: Pulmonary effort is normal.      Breath sounds: Normal breath sounds.   Cardiovascular:      Normal rate. Regular rhythm.         Procedures      Current Outpatient Medications   Medication Sig Dispense Refill   • allopurinol (ZYLOPRIM) 300 MG tablet Take 1 tablet by mouth Daily. 30 tablet 0   • amLODIPine (NORVASC) 10 MG tablet amlodipine 10 mg tablet daily     • aspirin (aspirin) 81 MG EC tablet Take 1 tablet by mouth Daily. 30 tablet 6   • atorvastatin (LIPITOR) 40 MG tablet Take 1 tablet by mouth Every Night. 90 tablet 3   • bisoprolol (ZEBeta) 5 MG tablet Take 1 tablet by mouth Daily. 90 tablet 0   • Blood Glucose Monitoring Suppl (OneTouch Verio) w/Device kit 1 each Take As Directed. Use to test blood sugar up to 4 times daily as needed 1 kit 0   • citalopram (CeleXA) 40 MG tablet citalopram 40 mg tablet   Take 1 tablet every day by oral route.     • clopidogrel (PLAVIX) 75 MG tablet Take 1 tablet by mouth Daily. 90 tablet 3   • folic acid (FOLVITE) 1 MG tablet folic acid 1 mg tablet   TAKE ONE TABLET BY MOUTH ONCE DAILY     • glucose blood (OneTouch Verio) test strip Use to test blood sugar up to 4 times daily as needed 100 each 12   • Insulin Glargine (Lantus SoloStar) 100 UNIT/ML injection pen Inject 35 Units under the skin into the appropriate area as directed Every Night 15 mL 0   • Insulin Pen Needle (Pen Needles) 32G X 4 MM misc Inject 1 each under the skin into the appropriate area as directed Daily. Formulary Compliance Approval 100 each 0   • isosorbide mononitrate " (IMDUR) 30 MG 24 hr tablet Take 1 tablet by mouth Every Morning. 90 tablet 0   • Lancets misc Use to test blood glucose up to four times daily as needed. Formulary Compliance Approval. Diagnosis: Type 2 Diabetes - Insulin Dependent 100 each 0   • losartan (COZAAR) 100 MG tablet TAKE 1 TABLET BY MOUTH EVERY DAY 90 tablet 3   • nitroglycerin (NITROSTAT) 0.4 MG SL tablet Place 1 tablet under the tongue Every 5 (Five) Minutes As Needed for Chest Pain. Take no more than 3 doses in 15 minutes. 30 tablet 1   • Omega-3 Fatty Acids (OMEGA 3 500 PO) Take  by mouth. 8 Tablets daily       No current facility-administered medications for this visit.     Assessment:       Diagnosis Plan   1. Coronary artery disease involving native coronary artery of native heart without angina pectoris     2. S/P drug eluting coronary stent placement     3. Type 2 diabetes mellitus with other circulatory complication, with long-term current use of insulin ([facility])     4. PVD (peripheral vascular disease) with claudication ([facility])     5. Hyperlipidemia LDL goal <70          No orders of the defined types were placed in this encounter.        Plan:         1. 66 year old gentleman history of severe coronary artery disease.  Multiple interventions including angioplasty, bare metal stent placements, and drug eluding stent placements to the left anterior descending and circumflex.  Last cardiac catheterization in 2010 showed normal left ventricular systolic function in significant disease left anterior descending and circumflex with no evidence of restenosis but total occlusion of the nondominant right coronary artery.  Echocardiogram in 2012 normal LV systolic function.  Then recurrent symptoms repeat catheterization October 2019 all stents widely patent.  Insignificant FFR of mid circumflex stent.  Right coronary artery 100% occluded proximally with collateral flow.  Treated medically then new sudden symptoms, STEMI May 2022 status post PCI of the mid  circumflex and PCI of the ostial to mid LAD as well as the distal LAD.   Follow-up echo June 2022 showed normal left ventricular systolic function and normal wall motion postprocedure. He is now on atorvastatin and is to continue that with dual antiplatelet therapy lifelong    2. Diabetes mellitus on insulin followed by PCP. 1 month ago. A1c was 14.20. he has improved diarrhea off metformin. Per PCP. Consider adding Jardiance or Farxiga. Our office frequently can help with Jardiance samples if tried  3.  Hypertension blood pressure elevated today but normally better   4.  Mitral regurgitation-mild on echo September 2018-as above  5.  Bilateral carotid plaque no stenosis on duplex May 2021  6.  Hyperlipidemia on target dose simvastatin target LDL 70 or less  7. History of renal cancer s/p partial left nephrectomy-follows with nephrology with Dr. Regalado  8. Tobacco use-Unfortunately, he continues to smoke 1/4 pack of cigarettes daily.  He does have desire to quit but no set quit date.  I advised that he does   9.  Peripheral arterial disease.  He has exercise-induced claudication in the right lower extremity.  Abnormal CTA with runoff June 2021  10.  Erectile dysfunction.   11.  Enlarged mesenteric lymph nodes noted on CT abdomen June 2021.               Follow up in November as scheduled            It has been a pleasure to participate in this patient's care.      Thank you,  ALAN Apodaca      **I used Dragon to dictate this note:**

## 2022-07-26 ENCOUNTER — TELEPHONE (OUTPATIENT)
Dept: CARDIOLOGY | Facility: CLINIC | Age: 67
End: 2022-07-26

## 2022-07-26 NOTE — TELEPHONE ENCOUNTER
Received fax asking for clarification of Bisoprolol Rx.  Rx was sent to pharmacy for Bisoprolol 5 mg one tablet daily #90 zero refill.   This is what Melony sent in to them.  The pharmacist stated patient filled Rx for 10 mg on 6/20/22.  Patient has been cutting the 10 mg in half.  The pharmacy will hold christy Gonzalez's Rx until patient completes the 10 mg tablets./ JOSEY     [address] [place] [address]    Phone [phone]    / JOSEY

## 2022-11-09 ENCOUNTER — OFFICE VISIT (OUTPATIENT)
Dept: CARDIOLOGY | Facility: CLINIC | Age: 67
End: 2022-11-09

## 2022-11-09 VITALS
WEIGHT: 187.6 LBS | HEIGHT: 66 IN | HEART RATE: 65 BPM | BODY MASS INDEX: 30.15 KG/M2 | SYSTOLIC BLOOD PRESSURE: 152 MMHG | DIASTOLIC BLOOD PRESSURE: 70 MMHG

## 2022-11-09 DIAGNOSIS — I73.9 PVD (PERIPHERAL VASCULAR DISEASE) WITH CLAUDICATION: ICD-10-CM

## 2022-11-09 DIAGNOSIS — Z13.6 ENCOUNTER FOR SCREENING FOR CARDIOVASCULAR DISORDERS: Primary | ICD-10-CM

## 2022-11-09 PROCEDURE — 99214 OFFICE O/P EST MOD 30 MIN: CPT | Performed by: INTERNAL MEDICINE

## 2022-11-09 RX ORDER — CHLORTHALIDONE 25 MG/1
25 TABLET ORAL DAILY
COMMUNITY
Start: 2022-10-21

## 2022-11-09 RX ORDER — ATORVASTATIN CALCIUM 80 MG/1
80 TABLET, FILM COATED ORAL DAILY
Qty: 30 TABLET | Refills: 11 | Status: SHIPPED | OUTPATIENT
Start: 2022-11-09

## 2022-11-09 NOTE — PROGRESS NOTES
[facility]      Patient Name: Fernando Gauthier  :1955  Age: 67 y.o.  Encounter Provider:  Fran Giordano Jr, MD      Chief Complaint:   Chief Complaint   Patient presents with   • Coronary Artery Disease         HPI  Fernando Gauthier is a 67 y.o. male with past medical history of progressive coronary artery disease including recent cardiac catheterization  for unstable angina resulting in PCI to OM and LAD, ongoing tobacco abuse, poorly controlled diabetes, PAD who presents for follow-up evaluation.      Last clinic visit note: Previously followed by Dr. Osullivan for many years.  Last seen by ALAN Singh in May 2022.  I have reviewed all pertinent medical records personally in detail.  Diagnosis of coronary artery disease in  on cardiac catheterization at which time he had angioplasty placed.  Recurrent cardiac catheterization  with PCI to OM.  Repeat catheterization in  resulting in PCI to circumflex and proximal LAD.  Cardiac catheterization  with  of nondominant RCA.  He had a cardiac catheterization in  which showed nonobstructive disease with medical management recommended.  Ultimately he presented with shortness of breath and chest pain in May 2022.  Initial EKG showed anterolateral T wave inversion with nondiagnostic ST changes.  Taken to the lab for unstable angina and underwent PCI to the mid circumflex as well as the proximal to mid LAD.  He is done fairly well since then.  Unfortunately his A1c at that time was 14.2.  He has been working with Dr. Keating to get his diabetes under better control.  He continues to smoke quarter pack cigarettes daily.  Blood pressure is poorly controlled today in clinic.  He has not been following that closely at home.    Doing fairly well since last visit.  Unfortunately continues to smoke cigarettes.  He is fairly active with no chest pain or shortness of air.  Blood pressure is significantly elevated today in  "clinic.  Diabetes has been very poorly controlled with last A1c 14.  He does admit to claudication with activity and previous evidence of exercise-induced claudication.  He is not currently following with vascular surgery.  No cardiac complaints at time of interview.    The following portions of the patient's history were reviewed and updated as appropriate: allergies, current medications, past family history, past medical history, past social history, past surgical history and problem list.      Review of Systems   Constitutional: Negative for chills and fever.   HENT: Negative for hoarse voice and sore throat.    Eyes: Negative for double vision and photophobia.   Cardiovascular: Positive for claudication. Negative for chest pain, leg swelling, near-syncope, orthopnea, palpitations, paroxysmal nocturnal dyspnea and syncope.   Respiratory: Negative for cough and wheezing.    Skin: Negative for poor wound healing and rash.   Musculoskeletal: Negative for arthritis and joint swelling.   Gastrointestinal: Negative for bloating, abdominal pain, hematemesis and hematochezia.   Neurological: Negative for dizziness and focal weakness.   Psychiatric/Behavioral: Negative for depression and suicidal ideas.       OBJECTIVE:   Vital Signs  Vitals:    11/09/22 1031   BP: 152/70   Pulse: 65     Estimated body mass index is 30.28 kg/m² as calculated from the following:    Height as of this encounter: 167.6 cm (66\").    Weight as of this encounter: 85.1 kg (187 lb 9.6 oz).    Vitals reviewed.   Constitutional:       Appearance: Not in distress. Chronically ill-appearing.   Neck:      Vascular: No JVR. JVD normal.   Pulmonary:      Effort: Pulmonary effort is normal.      Breath sounds: Normal breath sounds. No wheezing. No rhonchi. No rales.   Chest:      Chest wall: Not tender to palpatation.   Cardiovascular:      PMI at left midclavicular line. Normal rate. Regular rhythm. Normal S1. Normal S2.      Murmurs: There is no murmur. "      No gallop. No click. No rub.   Pulses:     Intact distal pulses.   Edema:     Peripheral edema absent.   Abdominal:      General: Bowel sounds are normal.      Palpations: Abdomen is soft.      Tenderness: There is no abdominal tenderness.   Musculoskeletal: Normal range of motion.         General: No tenderness. Skin:     General: Skin is warm and dry.   Neurological:      General: No focal deficit present.      Mental Status: Alert and oriented to person, place and time.         Procedures          ASSESSMENT:     67-year-old male with known history of STEMI and PAD presents for routine follow-up    PLAN OF CARE:     1. CAD -continue aspirin, Plavix, beta-blocker.  LDL is not at goal.  Increased atorvastatin to 80 mg.  2. PAD -ongoing claudication with previous evidence of exercise-induced claudication on SYED.  Will refer to vascular surgery.  3. Uncontrolled hypertension -twice daily blood pressure log to be sent in after 1 week.  4. Uncontrolled diabetes -defer to Dr. Keating for ongoing work-up and management  5. Tobacco abuse -counseled on need for cessation and abstinence from nicotine products.  Patient is unwilling to quit at this time.  6. Encounter for cardiovascular screening -history of tobacco use inpatient age 65-75.  Plan for AAA scan.    Return to clinic 6-month             Discharge Medications          Accurate as of November 9, 2022 10:33 AM. If you have any questions, ask your nurse or doctor.            Continue These Medications      Instructions Start Date   allopurinol 300 MG tablet  Commonly known as: ZYLOPRIM   300 mg, Oral, Daily      amLODIPine 10 MG tablet  Commonly known as: NORVASC   amlodipine 10 mg tablet daily      aspirin 81 MG EC tablet   81 mg, Oral, Daily      atorvastatin 40 MG tablet  Commonly known as: LIPITOR   40 mg, Oral, Nightly      BD Pen Needle Mya 2nd Gen 32G X 4 MM misc  Generic drug: Insulin Pen Needle   1 each, Subcutaneous, Daily, Formulary Compliance  Approval      bisoprolol 5 MG tablet  Commonly known as: ZEBeta   5 mg, Oral, Every 24 Hours Scheduled      chlorthalidone 25 MG tablet  Commonly known as: HYGROTON   25 mg, Oral, Daily      citalopram 40 MG tablet  Commonly known as: CeleXA   citalopram 40 mg tablet   Take 1 tablet every day by oral route.      clopidogrel 75 MG tablet  Commonly known as: PLAVIX   75 mg, Oral, Daily      folic acid 1 MG tablet  Commonly known as: FOLVITE   folic acid 1 mg tablet   TAKE ONE TABLET BY MOUTH ONCE DAILY      isosorbide mononitrate 30 MG 24 hr tablet  Commonly known as: IMDUR   30 mg, Oral, Every Morning      Lantus SoloStar 100 UNIT/ML injection pen  Generic drug: Insulin Glargine   Inject 35 Units under the skin into the appropriate area as directed Every Night      losartan 100 MG tablet  Commonly known as: COZAAR   TAKE 1 TABLET BY MOUTH EVERY DAY      nitroglycerin 0.4 MG SL tablet  Commonly known as: NITROSTAT   0.4 mg, Sublingual, Every 5 Minutes PRN, Take no more than 3 doses in 15 minutes.      OMEGA 3 500 PO   Oral, 8 Tablets daily       OneTouch Delica Plus [id number] misc   Use to test blood glucose up to four times daily as needed. Formulary Compliance Approval. Diagnosis: Type 2 Diabetes - Insulin Dependent      OneTouch Verio Flex System w/Device kit   1 each, Does not apply, Take As Directed, Use to test blood sugar up to 4 times daily as needed      OneTouch Verio test strip  Generic drug: glucose blood   Use to test blood sugar up to 4 times daily as needed             Thank you for allowing me to participate in the care of your patient,      Sincerely,   Fran Giordano MD  [facility]  11/09/22  10:33 EST

## 2022-12-06 ENCOUNTER — APPOINTMENT (OUTPATIENT)
Dept: VASCULAR SURGERY | Facility: HOSPITAL | Age: 67
End: 2022-12-06

## 2022-12-06 PROCEDURE — G0463 HOSPITAL OUTPT CLINIC VISIT: HCPCS

## 2022-12-13 ENCOUNTER — HOSPITAL ENCOUNTER (OUTPATIENT)
Dept: ULTRASOUND IMAGING | Facility: HOSPITAL | Age: 67
Discharge: HOME | End: 2022-12-13
Admitting: INTERNAL MEDICINE

## 2022-12-13 DIAGNOSIS — Z13.6 ENCOUNTER FOR SCREENING FOR CARDIOVASCULAR DISORDERS: ICD-10-CM

## 2022-12-13 PROCEDURE — 76706 US ABDL AORTA SCREEN AAA: CPT

## 2022-12-14 ENCOUNTER — TELEPHONE (OUTPATIENT)
Dept: CARDIOLOGY | Facility: CLINIC | Age: 67
End: 2022-12-14

## 2022-12-14 NOTE — TELEPHONE ENCOUNTER
Called and left VM. Will continue to try to reach patient.     Carley Aranda RN  Triage [facility] [place]

## 2022-12-14 NOTE — TELEPHONE ENCOUNTER
Notified patient of results/recommendations. Patient verbalized understanding. He denied any problems with the 80mg of atorvastatin.     Carley Aranda RN  Triage [facility] [place]

## 2022-12-14 NOTE — TELEPHONE ENCOUNTER
Please inform patient. There are no sonographic findings of abdominal aortic aneurysm on ultrasound arranged by Dr. Giordano. There is  at least moderate atherosclerotic calcification so  He should continue higher dose atorvastatin at 80 mg, ASA and plavix. He needs to quit smoking. Hopefully he is tolerating this dose increase so far. If not, may need to try for PCSK9 inhibitor.

## 2023-05-09 ENCOUNTER — OFFICE VISIT (OUTPATIENT)
Dept: CARDIOLOGY | Facility: CLINIC | Age: 68
End: 2023-05-09
Payer: MEDICARE

## 2023-05-09 VITALS
HEIGHT: 66 IN | BODY MASS INDEX: 30.22 KG/M2 | HEART RATE: 63 BPM | DIASTOLIC BLOOD PRESSURE: 78 MMHG | SYSTOLIC BLOOD PRESSURE: 160 MMHG | WEIGHT: 188 LBS

## 2023-05-09 DIAGNOSIS — E78.5 HYPERLIPIDEMIA LDL GOAL <70: ICD-10-CM

## 2023-05-09 DIAGNOSIS — Z95.5 S/P DRUG ELUTING CORONARY STENT PLACEMENT: Primary | ICD-10-CM

## 2023-05-09 DIAGNOSIS — I73.9 PVD (PERIPHERAL VASCULAR DISEASE) WITH CLAUDICATION: ICD-10-CM

## 2023-05-09 DIAGNOSIS — E11.59 TYPE 2 DIABETES MELLITUS WITH OTHER CIRCULATORY COMPLICATION, WITH LONG-TERM CURRENT USE OF INSULIN: ICD-10-CM

## 2023-05-09 DIAGNOSIS — I25.10 CORONARY ARTERY DISEASE INVOLVING NATIVE CORONARY ARTERY OF NATIVE HEART WITHOUT ANGINA PECTORIS: ICD-10-CM

## 2023-05-09 DIAGNOSIS — Z79.4 TYPE 2 DIABETES MELLITUS WITH OTHER CIRCULATORY COMPLICATION, WITH LONG-TERM CURRENT USE OF INSULIN: ICD-10-CM

## 2023-05-09 NOTE — PROGRESS NOTES
Date of Office Visit: 23  Encounter Provider: ALAN Apodaca  Place of Service: [facility]  Patient Name: Fernando Gauthier  :1955    Chief Complaint   Patient presents with   • Coronary artery disease involving native coronary artery of   • Follow-up   :     HPI: Fernando Gauthier is a 67 y.o. male  with hypertension, hyperlipidemia, diabetes mellitus, mitral regurgitation, coronary artery disease status post multiple coronary stent placement, myocardial infarction, peripheral artery disease, and bilateral carotid artery plaque.  He is a patient of Dr. Osullivan.  I am seeing him in follow up today and have reviewed his medical record.     He was found to have severe disease of the proximal, mid and the distal LAD as well as disease of the marginal branch.  He had angioplasty to multiple areas of the LAD back in 2001 and then recurrent chest discomfort 2004.  He had angioplasty and stent placed to the marginal.  Then 2008 had recurrent cardiac symptoms perfusion stress test was unremarkable proceeded with cardiac catheterization where he had angioplasty stent placement of the circumflex as well as to the proximal LAD.  Then in  he presented with progressive dyspnea and had a repeat cardiac catheterization showed normal left ventricular systolic function with total occlusion of the right coronary artery not amenable to angioplasty of the vessels were free of obstructive disease and since the right coronary artery was nondominant he was treated medically.  He had a nonischemic stress test in .     In 10/3/2019, he complained of intermittent chest tightness associated with nausea diaphoresis and shortness of breath.  He had cardiac catheterization completed 10/7/2019 which showed a very short and normal left main, 20-30% LAD disease with a proximal stent that was widely patent mid LAD stent which was widely patent and another mid to distal  LAD stent which was widely patent.  There is a 30% lesion.  Apical LAD stent was widely patent.  There was a 40% area but then a very apical LAD stent with 20% diffuse restenosis.  No ramus intermedius.  The circumflex had a stent in the proximal portion which was widely patent.  There was a large stent in the midportion with 50% restenosis and a very small 1 to with 40-50% disease too small to intervene.  There was an FFR and RFR on the mid circumflex stent which was not hemodynamically significant.  The right coronary artery was small nondominant with 100% occluded right coronary artery with collateral flow.  Imdur was added to his regimen.  He later was found to have exercise-induced claudication particularly in the right lower extremity.        He then developed brain fog and left-sided numbness and tingling with increased shortness of breath and chest pressure while out on the golf [place] with his grandson who is a .  His grand son called EMS and he was brought emergently to [facility].  Initial EKG showed evidence of ST elevation compatible with STEMI in May 2022 and had significant lesion of the mid LAD and mid circumflex.  He received PCI of the mid circumflex with drug-eluting stent as well as PCI of the ostial to mid LAD with overlapping stents x2 and successful PCI of the mid to distal LAD with 2 additional stents which were postdilated.  Dual antiplatelet therapy was recommended lifelong.  Follow-up echo June 2022 showed normal left ventricular systolic function and normal wall motion postprocedure.     He presents today for 6-month reassessment.  Unfortunately continues to smoke with no desire to quit.  He stays very active and has no chest pain tightness pressure shortness of breath palpitations or edema.  He is tolerating his medication without any concerns.          Allergies   Allergen Reactions   • Metformin Diarrhea           Family and social history reviewed.  "    ROS  All other systems were reviewed and are negative          Objective:     Vitals:    05/09/23 1320   BP: 160/78   BP Location: Left arm   Patient Position: Sitting   Pulse: 63   Weight: 85.3 kg (188 lb)   Height: 167.6 cm (66\")     Body mass index is 30.34 kg/m².    PHYSICAL EXAM:  Pulmonary:      Effort: Pulmonary effort is normal.      Breath sounds: Normal breath sounds.   Cardiovascular:      Normal rate. Regular rhythm.           ECG 12 Lead    Date/Time: 5/9/2023 1:42 PM  Performed by: Melony Singh APRN  Authorized by: Melony Singh APRN   Comparison: compared with previous ECG   Similar to previous ECG  Rhythm: sinus rhythm  Rate: normal  Conduction: 1st degree AV block  QRS axis: right                Current Outpatient Medications   Medication Sig Dispense Refill   • allopurinol (ZYLOPRIM) 300 MG tablet Take 1 tablet by mouth Daily. 30 tablet 0   • amLODIPine (NORVASC) 10 MG tablet amlodipine 10 mg tablet daily     • aspirin (aspirin) 81 MG EC tablet Take 1 tablet by mouth Daily. 30 tablet 6   • atorvastatin (LIPITOR) 80 MG tablet Take 1 tablet by mouth Daily. 30 tablet 11   • bisoprolol (ZEBeta) 5 MG tablet Take 1 tablet by mouth Daily. 90 tablet 0   • Blood Glucose Monitoring Suppl (OneTouch Verio) w/Device kit 1 each Take As Directed. Use to test blood sugar up to 4 times daily as needed 1 kit 0   • chlorthalidone (HYGROTON) 25 MG tablet Take 1 tablet by mouth Daily.     • citalopram (CeleXA) 40 MG tablet citalopram 40 mg tablet   Take 1 tablet every day by oral route.     • clopidogrel (PLAVIX) 75 MG tablet Take 1 tablet by mouth Daily. 90 tablet 3   • folic acid (FOLVITE) 1 MG tablet folic acid 1 mg tablet   TAKE ONE TABLET BY MOUTH ONCE DAILY     • glucose blood (OneTouch Verio) test strip Use to test blood sugar up to 4 times daily as needed 100 each 12   • Insulin Glargine (Lantus SoloStar) 100 UNIT/ML injection pen Inject 35 Units under the skin into the appropriate area as directed Every " Night 15 mL 0   • Insulin Pen Needle (Pen Needles) 32G X 4 MM misc Inject 1 each under the skin into the appropriate area as directed Daily. Formulary Compliance Approval 100 each 0   • isosorbide mononitrate (IMDUR) 30 MG 24 hr tablet Take 1 tablet by mouth Every Morning. 90 tablet 0   • Lancets misc Use to test blood glucose up to four times daily as needed. Formulary Compliance Approval. Diagnosis: Type 2 Diabetes - Insulin Dependent 100 each 0   • losartan (COZAAR) 100 MG tablet TAKE 1 TABLET BY MOUTH EVERY DAY 90 tablet 3   • nitroglycerin (NITROSTAT) 0.4 MG SL tablet Place 1 tablet under the tongue Every 5 (Five) Minutes As Needed for Chest Pain. Take no more than 3 doses in 15 minutes. 30 tablet 1   • Omega-3 Fatty Acids (OMEGA 3 500 PO) Take  by mouth. 8 Tablets daily       No current facility-administered medications for this visit.     Assessment:       Diagnosis Plan   1. S/P drug eluting coronary stent placement        2. Coronary artery disease involving native coronary artery of native heart without angina pectoris        3. Type 2 diabetes mellitus with other circulatory complication, with long-term current use of insulin        4. Hyperlipidemia LDL goal <70        5. PVD (peripheral vascular disease) with claudication             Orders Placed This Encounter   Procedures   • ECG 12 Lead     This order was created via procedure documentation     Order Specific Question:   Release to patient     Answer:   Routine Release         Plan:                1. 67 year old gentleman history of severe coronary artery disease.  Multiple interventions including angioplasty, bare metal stent placements, and drug eluding stent placements to the left anterior descending and circumflex.  Last cardiac catheterization in 2010 showed normal left ventricular systolic function in significant disease left anterior descending and circumflex with no evidence of restenosis but total occlusion of the nondominant right coronary  artery.  Echocardiogram in 2012 normal LV systolic function.  Then recurrent symptoms repeat catheterization October 2019 all stents widely patent.  Insignificant FFR of mid circumflex stent.  Right coronary artery 100% occluded proximally with collateral flow.  Treated medically then new sudden symptoms, STEMI May 2022 status post PCI of the mid circumflex and PCI of the ostial to mid LAD as well as the distal LAD.   Follow-up echo June 2022 showed normal left ventricular systolic function and normal wall motion postprocedure.  - no angina or exertional  Symptoms.   - continue atorvastatin and dual antiplatelet therapy lifelong     2. Diabetes mellitus on insulin - Hemoglobin A1c 7.0 10/2022   3.  Hypertension blood pressure elevated today but reportedly better at home    4.  Mitral regurgitation-mild on echo September 2018-as above  5.  Bilateral carotid plaque no stenosis on duplex May 2021  6.  Hyperlipidemia on target dose simvastatin target LDL 70 or less. Last LDL 68 in 10/2022  7. History of renal cancer s/p partial left nephrectomy-follows with nephrology with Dr. Regalado  8. Tobacco use-Unfortunately, he continues to smoke 1/4 pack of cigarettes daily.  He does have desire to quit but no set quit date.  I advised that he does   9.  Peripheral arterial disease.  He has exercise-induced claudication in the right lower extremity.  Abnormal CTA with runoff June 2021  10. CKD- stable   11.  Enlarged mesenteric lymph nodes noted on CT abdomen June 2021.     12.  Erectile dysfunction.         Call with questions or concerns.   Follow up in 6 months.           It has been a pleasure to participate in this patient's care.      Thank you,  ALAN Apodaca      **I used Dragon to dictate this note:**

## 2023-11-22 ENCOUNTER — OFFICE VISIT (OUTPATIENT)
Dept: CARDIOLOGY | Facility: CLINIC | Age: 68
End: 2023-11-22
Payer: MEDICARE

## 2023-11-22 VITALS
OXYGEN SATURATION: 98 % | HEIGHT: 66 IN | WEIGHT: 189.6 LBS | HEART RATE: 64 BPM | SYSTOLIC BLOOD PRESSURE: 156 MMHG | BODY MASS INDEX: 30.47 KG/M2 | DIASTOLIC BLOOD PRESSURE: 78 MMHG

## 2023-11-22 DIAGNOSIS — I25.2 HISTORY OF ST ELEVATION MYOCARDIAL INFARCTION (STEMI): ICD-10-CM

## 2023-11-22 DIAGNOSIS — I73.9 PVD (PERIPHERAL VASCULAR DISEASE) WITH CLAUDICATION: ICD-10-CM

## 2023-11-22 DIAGNOSIS — E78.5 HYPERLIPIDEMIA LDL GOAL <70: ICD-10-CM

## 2023-11-22 DIAGNOSIS — I25.10 CORONARY ARTERY DISEASE INVOLVING NATIVE CORONARY ARTERY OF NATIVE HEART WITHOUT ANGINA PECTORIS: Primary | ICD-10-CM

## 2023-11-22 PROCEDURE — 99214 OFFICE O/P EST MOD 30 MIN: CPT | Performed by: INTERNAL MEDICINE

## 2023-11-22 NOTE — PROGRESS NOTES
[facility]      Patient Name: Fernando Gauthier  :1955  Age: 68 y.o.  Encounter Provider:  Fran Giordano Jr, MD      Chief Complaint:   Chief Complaint   Patient presents with    Coronary artery disease involving native coronary artery of         HPI  Fernando Gauthier is a 68 y.o. male with past medical history of progressive coronary artery disease including recent cardiac catheterization  for unstable angina resulting in PCI to OM and LAD, ongoing tobacco abuse, poorly controlled diabetes, PAD who presents for follow-up evaluation.      Last clinic visit note: Previously followed by Dr. Osullivan for many years.  Last seen by ALAN Singh in May 2022.  I have reviewed all pertinent medical records personally in detail.  Diagnosis of coronary artery disease in  on cardiac catheterization at which time he had angioplasty placed.  Recurrent cardiac catheterization  with PCI to OM.  Repeat catheterization in  resulting in PCI to circumflex and proximal LAD.  Cardiac catheterization  with  of nondominant RCA.  He had a cardiac catheterization in  which showed nonobstructive disease with medical management recommended.  Ultimately he presented with shortness of breath and chest pain in May 2022.  Initial EKG showed anterolateral T wave inversion with nondiagnostic ST changes.  Taken to the lab for unstable angina and underwent PCI to the mid circumflex as well as the proximal to mid LAD.  He is done fairly well since then.  Unfortunately his A1c at that time was 14.2.  He has been working with Dr. Keating to get his diabetes under better control.  He continues to smoke quarter pack cigarettes daily.  Blood pressure is poorly controlled today in clinic.  He has not been following that closely at home.    Doing fairly well since last visit.  No chest pain or shortness of air with activity.  He has left hip claudication that resolves with rest.  Interestingly the  "exercise SYED from 2 years ago showed some claudication on the right side but normal vascular hemodynamics.  He continues to smoke cigarettes with no intention on quitting.  Blood pressure is elevated today in clinic.  No recent cholesterol profile in the computer.    The following portions of the patient's history were reviewed and updated as appropriate: allergies, current medications, past family history, past medical history, past social history, past surgical history and problem list.      Review of Systems   Constitutional: Negative for chills and fever.   HENT:  Negative for hoarse voice and sore throat.    Eyes:  Negative for double vision and photophobia.   Cardiovascular:  Positive for claudication. Negative for chest pain, leg swelling, near-syncope, orthopnea, palpitations, paroxysmal nocturnal dyspnea and syncope.   Respiratory:  Negative for cough and wheezing.    Skin:  Negative for poor wound healing and rash.   Musculoskeletal:  Negative for arthritis and joint swelling.   Gastrointestinal:  Negative for bloating, abdominal pain, hematemesis and hematochezia.   Neurological:  Negative for dizziness and focal weakness.   Psychiatric/Behavioral:  Negative for depression and suicidal ideas.        OBJECTIVE:   Vital Signs  Vitals:    11/22/23 0848   BP: 156/78   Pulse: 64   SpO2: 98%     Estimated body mass index is 30.6 kg/m² as calculated from the following:    Height as of this encounter: 167.6 cm (66\").    Weight as of this encounter: 86 kg (189 lb 9.6 oz).    Vitals reviewed.   Constitutional:       Appearance: Not in distress. Chronically ill-appearing.   Neck:      Vascular: No JVR. JVD normal.   Pulmonary:      Effort: Pulmonary effort is normal.      Breath sounds: Normal breath sounds. No wheezing. No rhonchi. No rales.   Chest:      Chest wall: Not tender to palpatation.   Cardiovascular:      PMI at left midclavicular line. Normal rate. Regular rhythm. Normal S1. Normal S2.       Murmurs: " There is no murmur.      No gallop.  No click. No rub.   Pulses:     Intact distal pulses.   Edema:     Peripheral edema absent.   Abdominal:      General: Bowel sounds are normal.      Palpations: Abdomen is soft.      Tenderness: There is no abdominal tenderness.   Musculoskeletal: Normal range of motion.         General: No tenderness. Skin:     General: Skin is warm and dry.   Neurological:      General: No focal deficit present.      Mental Status: Alert and oriented to person, place and time.       Procedures          ASSESSMENT:     67-year-old male with known history of STEMI and PAD presents for routine follow-up    PLAN OF CARE:     CAD -continue aspirin, Plavix, beta-blocker.  Will request latest lipid profile from PCP.  PAD -ongoing claudication with previous evidence of exercise-induced claudication on SYED.  He had vascular surgery evaluation in December 2022 which showed stable SYED findings.  They discussed serial evaluation but the patient preferred to see them as needed.  We will repeat SYED next year same time.  Uncontrolled hypertension -he states this is about his average on the home blood pressure cuff but his home blood pressure cuff is a wrist cuff which was compared in clinic today and is about 25 mmHg higher than manual cuff pressure.  I have recommended he get an arm cuff.  Twice daily blood pressure log to be sent in after 1 week.  Uncontrolled diabetes -defer to Dr. Keating for ongoing work-up and management  Tobacco abuse -counseled on need for cessation and abstinence from nicotine products.  Patient is unwilling to quit at this time.  Encounter for cardiovascular screening -history of tobacco use inpatient age 65-75.  Limited ultrasound for AAA showed maximal diameter 2.4 cm.  Under clinical progress.    Return to clinic 6-month with ALAN Singh             Discharge Medications            Accurate as of November 22, 2023  8:51 AM. If you have any questions, ask your nurse or  doctor.                Changes to Medications        Instructions Start Date   Lantus SoloStar 100 UNIT/ML injection pen  Generic drug: Insulin Glargine  What changed: how much to take   Inject 35 Units under the skin into the appropriate area as directed Every Night             Continue These Medications        Instructions Start Date   allopurinol 300 MG tablet  Commonly known as: ZYLOPRIM   300 mg, Oral, Daily      amLODIPine 10 MG tablet  Commonly known as: NORVASC   amlodipine 10 mg tablet daily      aspirin 81 MG EC tablet   81 mg, Oral, Daily      atorvastatin 80 MG tablet  Commonly known as: LIPITOR   80 mg, Oral, Daily      BD Pen Needle Mya 2nd Gen 32G X 4 MM misc  Generic drug: Insulin Pen Needle   1 each, Subcutaneous, Daily, Formulary Compliance Approval      bisoprolol 5 MG tablet  Commonly known as: ZEBeta   5 mg, Oral, Every 24 Hours Scheduled      chlorthalidone 25 MG tablet  Commonly known as: HYGROTON   25 mg, Oral, Daily      citalopram 40 MG tablet  Commonly known as: CeleXA   citalopram 40 mg tablet   Take 1 tablet every day by oral route.      clopidogrel 75 MG tablet  Commonly known as: PLAVIX   75 mg, Oral, Daily      folic acid 1 MG tablet  Commonly known as: FOLVITE   folic acid 1 mg tablet   TAKE ONE TABLET BY MOUTH ONCE DAILY      isosorbide mononitrate 30 MG 24 hr tablet  Commonly known as: IMDUR   30 mg, Oral, Every Morning      losartan 100 MG tablet  Commonly known as: COZAAR   TAKE 1 TABLET BY MOUTH EVERY DAY      nitroglycerin 0.4 MG SL tablet  Commonly known as: NITROSTAT   0.4 mg, Sublingual, Every 5 Minutes PRN, Take no more than 3 doses in 15 minutes.      OMEGA 3 500 PO   Oral, 8 Tablets daily       OneTouch Delica Plus [id number] misc   Use to test blood glucose up to four times daily as needed. Formulary Compliance Approval. Diagnosis: Type 2 Diabetes - Insulin Dependent      OneTouch Verio Flex System w/Device kit   1 each, Does not apply, Take As Directed, Use to test  blood sugar up to 4 times daily as needed      OneTouch Verio test strip  Generic drug: glucose blood   Use to test blood sugar up to 4 times daily as needed      VITAMIN D PO   Oral, Daily               Thank you for allowing me to participate in the care of your patient,      Sincerely,   Fran Giordano MD  [facility]  11/22/23  08:51 EST

## 2023-11-27 ENCOUNTER — TELEPHONE (OUTPATIENT)
Dept: CARDIOLOGY | Facility: CLINIC | Age: 68
End: 2023-11-27

## 2023-11-27 NOTE — TELEPHONE ENCOUNTER
Caller: TRUE    Relationship: Provider    Best call back number: [phone]    What is the best time to reach you: ANYTIME    Who are you requesting to speak with (clinical staff, provider,  specific staff member): CLINICAL    What was the call regarding: TRUE FROM DR. MASTERS OFFICE IS CALLING BACK TO SPEAK WITH SOMEONE THAT SAID THEY NEEDED LABS FOR THE PT. SHE SAID THEY ONLY HAD LABS FROM 10/21/22    Is it okay if the provider responds through [employer]: NO

## 2024-05-21 ENCOUNTER — OFFICE VISIT (OUTPATIENT)
Dept: CARDIOLOGY | Facility: CLINIC | Age: 69
End: 2024-05-21
Payer: MEDICARE

## 2024-05-21 VITALS
WEIGHT: 181 LBS | DIASTOLIC BLOOD PRESSURE: 70 MMHG | HEART RATE: 63 BPM | HEIGHT: 66 IN | SYSTOLIC BLOOD PRESSURE: 140 MMHG | BODY MASS INDEX: 29.09 KG/M2

## 2024-05-21 DIAGNOSIS — I25.10 CORONARY ARTERY DISEASE INVOLVING NATIVE CORONARY ARTERY OF NATIVE HEART WITHOUT ANGINA PECTORIS: ICD-10-CM

## 2024-05-21 DIAGNOSIS — I73.9 PVD (PERIPHERAL VASCULAR DISEASE) WITH CLAUDICATION: ICD-10-CM

## 2024-05-21 PROCEDURE — 1160F RVW MEDS BY RX/DR IN RCRD: CPT | Performed by: NURSE PRACTITIONER

## 2024-05-21 PROCEDURE — 99214 OFFICE O/P EST MOD 30 MIN: CPT | Performed by: NURSE PRACTITIONER

## 2024-05-21 PROCEDURE — 93000 ELECTROCARDIOGRAM COMPLETE: CPT | Performed by: NURSE PRACTITIONER

## 2024-05-21 PROCEDURE — 1159F MED LIST DOCD IN RCRD: CPT | Performed by: NURSE PRACTITIONER

## 2024-05-21 RX ORDER — GLIMEPIRIDE 2 MG/1
2 TABLET ORAL 2 TIMES DAILY
COMMUNITY

## 2024-05-21 NOTE — PROGRESS NOTES
Date of Office Visit: 24  Encounter Provider: ALAN Apodaca  Place of Service: [facility] CARDIOLOGY  Patient Name: Fernando Gauthier  :1955    Chief Complaint   Patient presents with    Coronary artery disease involving native coronary artery of    S/P drug eluting coronary stent placement    History of ST elevation myocardial infarction (STEMI)    Follow-up   :     HPI: Fernando Gauthier is a 68 y.o. male  with hypertension, hyperlipidemia, diabetes mellitus, mitral regurgitation, coronary artery disease status post multiple coronary stent placement, myocardial infarction, peripheral artery disease, and bilateral carotid artery plaque.  He is a patient of Dr. Osullivan.  I am seeing him in follow up today and have reviewed his medical record.     He was found to have severe disease of the proximal, mid and the distal LAD as well as disease of the marginal branch.  He had angioplasty to multiple areas of the LAD back in 2001 and then recurrent chest discomfort 2004.  He had angioplasty and stent placed to the marginal.  Then 2008 had recurrent cardiac symptoms perfusion stress test was unremarkable proceeded with cardiac catheterization where he had angioplasty stent placement of the circumflex as well as to the proximal LAD.  Then in  he presented with progressive dyspnea and had a repeat cardiac catheterization showed normal left ventricular systolic function with total occlusion of the right coronary artery not amenable to angioplasty of the vessels were free of obstructive disease and since the right coronary artery was nondominant he was treated medically.  He had a nonischemic stress test in .     In 10/3/2019, he complained of intermittent chest tightness associated with nausea diaphoresis and shortness of breath.  He had cardiac catheterization completed 10/7/2019 which showed a very short and normal left main, 20-30% LAD disease with a  proximal stent that was widely patent mid LAD stent which was widely patent and another mid to distal LAD stent which was widely patent.  There is a 30% lesion.  Apical LAD stent was widely patent.  There was a 40% area but then a very apical LAD stent with 20% diffuse restenosis.  No ramus intermedius.  The circumflex had a stent in the proximal portion which was widely patent.  There was a large stent in the midportion with 50% restenosis and a very small 1 to with 40-50% disease too small to intervene.  There was an FFR and RFR on the mid circumflex stent which was not hemodynamically significant.  The right coronary artery was small nondominant with 100% occluded right coronary artery with collateral flow.  Imdur was added to his regimen.  He later was found to have exercise-induced claudication particularly in the right lower extremity.        He then developed brain fog and left-sided numbness and tingling with increased shortness of breath and chest pressure while out on the golf course with his grandson who is a .  His grand son called EMS and he was brought emergently to [facility].  Initial EKG showed evidence of ST elevation compatible with STEMI in May 2022 and had significant lesion of the mid LAD and mid circumflex.  He received PCI of the mid circumflex with drug-eluting stent as well as PCI of the ostial to mid LAD with overlapping stents x2 and successful PCI of the mid to distal LAD with 2 additional stents which were postdilated.  Dual antiplatelet therapy was recommended lifelong.  Follow-up echo June 2022 showed normal left ventricular systolic function and normal wall motion postprocedure.        He presents today for 6-month reassessment. Unfortunately, he continues to smoke cigarettes daily with no desire to quit. He stays very active and has no chest pain tightness pressure shortness of breath palpitations or edema.  He golfs routinely but he does not walk the golf  "course.  He is tolerating his medication without any concerns.  He denies bleeding issues.          Allergies   Allergen Reactions    Metformin Diarrhea           Family and social history reviewed.     ROS  All other systems were reviewed and are negative          Objective:     Vitals:    05/21/24 1055   BP: 140/70   BP Location: Left arm   Patient Position: Sitting   Pulse: 63   Weight: 82.1 kg (181 lb)   Height: 167.6 cm (66\")     Body mass index is 29.21 kg/m².    PHYSICAL EXAM:  Pulmonary:      Effort: Pulmonary effort is normal.      Breath sounds: Normal breath sounds.   Cardiovascular:      Normal rate. Regular rhythm.      Comments: Right carotid bruit          ECG 12 Lead    Date/Time: 5/21/2024 11:22 AM  Performed by: Melony Singh APRN    Authorized by: Melony Singh APRN  Comparison: compared with previous ECG   Similar to previous ECG  Rhythm: sinus rhythm  Rate: normal  Conduction: 1st degree AV block  QRS axis: right  Comments: No change            Current Outpatient Medications   Medication Sig Dispense Refill    allopurinol (ZYLOPRIM) 300 MG tablet Take 1 tablet by mouth Daily. 30 tablet 0    amLODIPine (NORVASC) 10 MG tablet amlodipine 10 mg tablet daily      aspirin (aspirin) 81 MG EC tablet Take 1 tablet by mouth Daily. 30 tablet 6    atorvastatin (LIPITOR) 80 MG tablet Take 1 tablet by mouth Daily. 30 tablet 11    bisoprolol (ZEBeta) 5 MG tablet Take 1 tablet by mouth Daily. 90 tablet 0    Blood Glucose Monitoring Suppl (OneTouch Verio) w/Device kit 1 each Take As Directed. Use to test blood sugar up to 4 times daily as needed 1 kit 0    chlorthalidone (HYGROTON) 25 MG tablet Take 1 tablet by mouth Daily.      citalopram (CeleXA) 40 MG tablet citalopram 40 mg tablet   Take 1 tablet every day by oral route.      clopidogrel (PLAVIX) 75 MG tablet Take 1 tablet by mouth Daily. 90 tablet 3    folic acid (FOLVITE) 1 MG tablet folic acid 1 mg tablet   TAKE ONE TABLET BY MOUTH ONCE DAILY      " glimepiride (AMARYL) 2 MG tablet Take 1 tablet by mouth 2 (Two) Times a Day.      glucose blood (OneTouch Verio) test strip Use to test blood sugar up to 4 times daily as needed 100 each 12    Insulin Glargine (Lantus SoloStar) 100 UNIT/ML injection pen Inject 35 Units under the skin into the appropriate area as directed Every Night (Patient taking differently: Inject 45 Units under the skin into the appropriate area as directed Every Night.) 15 mL 0    Insulin Pen Needle (Pen Needles) 32G X 4 MM misc Inject 1 each under the skin into the appropriate area as directed Daily. Formulary Compliance Approval 100 each 0    isosorbide mononitrate (IMDUR) 30 MG 24 hr tablet Take 1 tablet by mouth Every Morning. 90 tablet 0    Lancets misc Use to test blood glucose up to four times daily as needed. Formulary Compliance Approval. Diagnosis: Type 2 Diabetes - Insulin Dependent 100 each 0    losartan (COZAAR) 100 MG tablet TAKE 1 TABLET BY MOUTH EVERY DAY 90 tablet 3    nitroglycerin (NITROSTAT) 0.4 MG SL tablet Place 1 tablet under the tongue Every 5 (Five) Minutes As Needed for Chest Pain. Take no more than 3 doses in 15 minutes. 30 tablet 1    Omega-3 Fatty Acids (OMEGA 3 500 PO) Take  by mouth. 8 Tablets daily      VITAMIN D PO Take  by mouth Daily.       No current facility-administered medications for this visit.     Assessment:       Diagnosis Plan   1. Coronary artery disease involving native coronary artery of native heart without angina pectoris        2. PVD (peripheral vascular disease) with claudication             No orders of the defined types were placed in this encounter.        Plan:        1. 68 year old gentleman history of severe coronary artery disease.  Multiple interventions including angioplasty, bare metal stent placements, and drug eluding stent placements to the left anterior descending and circumflex.  Last cardiac catheterization in 2010 showed normal left ventricular systolic function in  significant disease left anterior descending and circumflex with no evidence of restenosis but total occlusion of the nondominant right coronary artery.  Echocardiogram in 2012 normal LV systolic function.  Then recurrent symptoms repeat catheterization October 2019 all stents widely patent.  Insignificant FFR of mid circumflex stent.  Right coronary artery 100% occluded proximally with collateral flow.  Treated medically then new sudden symptoms, STEMI May 2022 status post PCI of the mid circumflex and PCI of the ostial to mid LAD as well as the distal LAD.   Follow-up echo June 2022 showed normal left ventricular systolic function and normal wall motion postprocedure.  - no angina or exertional issues.  - continue atorvastatin and dual antiplatelet therapy lifelong.  He has no bleeding issues     2. Diabetes mellitus on insulin -he has follow-up with his PCP this afternoon and is planning to have blood work  3.  Hypertension blood pressure elevated today but reportedly better at home.  He tells me he needs to get a new blood pressure cuff but will start rechecking soon  4.  Mitral regurgitation-mild on echo September 2018-as above  5.  Bilateral carotid plaque no stenosis on duplex May 2021.  Faint right carotid bruit appreciated today  6.  Hyperlipidemia on target dose simvastatin target LDL 70 or less.   7. History of renal cancer s/p partial left nephrectomy-follows with nephrology   8. Tobacco use-Unfortunately, he continues to smoke cigarettes daily.  I advised that he quit  9.  Peripheral arterial disease.  He has exercise-induced claudication in the right lower extremity.  Abnormal CTA with runoff June 2021  10. CKD- stable   11.  Enlarged mesenteric lymph nodes noted on CT abdomen June 2021.     12.  Erectile dysfunction.        Follow-up in 6 months.  Call with questions or concerns            It has been a pleasure to participate in this patient's care.      Thank you,  ALAN Apodaca      **I used  Dragon to dictate this note:**

## 2024-10-15 DIAGNOSIS — N52.9 ERECTILE DYSFUNCTION, UNSPECIFIED ERECTILE DYSFUNCTION TYPE: Primary | ICD-10-CM

## 2024-10-16 RX ORDER — SILDENAFIL CITRATE 20 MG/1
TABLET ORAL
Qty: 90 TABLET | Refills: 5 | OUTPATIENT
Start: 2024-10-16

## 2024-12-10 ENCOUNTER — OFFICE VISIT (OUTPATIENT)
Age: 69
End: 2024-12-10
Payer: MEDICARE

## 2024-12-10 VITALS
OXYGEN SATURATION: 98 % | WEIGHT: 175 LBS | BODY MASS INDEX: 28.12 KG/M2 | RESPIRATION RATE: 14 BRPM | TEMPERATURE: 98 F | SYSTOLIC BLOOD PRESSURE: 138 MMHG | HEART RATE: 74 BPM | DIASTOLIC BLOOD PRESSURE: 70 MMHG | HEIGHT: 66 IN

## 2024-12-10 DIAGNOSIS — K76.0 HEPATIC STEATOSIS: ICD-10-CM

## 2024-12-10 DIAGNOSIS — F32.5 MAJOR DEPRESSION IN REMISSION: ICD-10-CM

## 2024-12-10 DIAGNOSIS — I73.9 PERIPHERAL VASCULAR DISEASE: ICD-10-CM

## 2024-12-10 DIAGNOSIS — I25.10 CORONARY ARTERIOSCLEROSIS: ICD-10-CM

## 2024-12-10 DIAGNOSIS — E78.2 MIXED HYPERLIPIDEMIA: ICD-10-CM

## 2024-12-10 DIAGNOSIS — E83.42 HYPOMAGNESEMIA: ICD-10-CM

## 2024-12-10 DIAGNOSIS — M1A.29X0 DRUG-INDUCED CHRONIC GOUT OF MULTIPLE SITES WITHOUT TOPHUS: ICD-10-CM

## 2024-12-10 DIAGNOSIS — Z87.891 HISTORY OF TOBACCO USE: ICD-10-CM

## 2024-12-10 DIAGNOSIS — E11.65 TYPE 2 DIABETES MELLITUS WITH HYPERGLYCEMIA, WITH LONG-TERM CURRENT USE OF INSULIN: Primary | ICD-10-CM

## 2024-12-10 DIAGNOSIS — N18.32 CKD STAGE 3B, GFR 30-44 ML/MIN: ICD-10-CM

## 2024-12-10 DIAGNOSIS — Z79.4 TYPE 2 DIABETES MELLITUS WITH HYPERGLYCEMIA, WITH LONG-TERM CURRENT USE OF INSULIN: Primary | ICD-10-CM

## 2024-12-10 PROBLEM — K21.00 GASTROESOPHAGEAL REFLUX DISEASE WITH ESOPHAGITIS WITHOUT HEMORRHAGE: Status: ACTIVE | Noted: 2024-12-10

## 2024-12-10 PROBLEM — Z85.528 HISTORY OF RENAL CELL CANCER: Status: ACTIVE | Noted: 2024-12-10

## 2024-12-10 PROBLEM — M1A.09X0 CHRONIC GOUT OF MULTIPLE SITES: Status: ACTIVE | Noted: 2024-12-10

## 2024-12-10 PROBLEM — E88.09 HYPERPROTEINEMIA: Status: ACTIVE | Noted: 2024-12-10

## 2024-12-10 PROCEDURE — G2211 COMPLEX E/M VISIT ADD ON: HCPCS | Performed by: FAMILY MEDICINE

## 2024-12-10 PROCEDURE — 1126F AMNT PAIN NOTED NONE PRSNT: CPT | Performed by: FAMILY MEDICINE

## 2024-12-10 PROCEDURE — 1160F RVW MEDS BY RX/DR IN RCRD: CPT | Performed by: FAMILY MEDICINE

## 2024-12-10 PROCEDURE — 99204 OFFICE O/P NEW MOD 45 MIN: CPT | Performed by: FAMILY MEDICINE

## 2024-12-10 PROCEDURE — 1159F MED LIST DOCD IN RCRD: CPT | Performed by: FAMILY MEDICINE

## 2024-12-10 RX ORDER — SILDENAFIL 100 MG/1
TABLET, FILM COATED ORAL
Qty: 90 TABLET | Refills: 3 | Status: SHIPPED | OUTPATIENT
Start: 2024-12-10

## 2024-12-10 RX ORDER — EZETIMIBE 10 MG/1
TABLET ORAL
COMMUNITY
Start: 2024-11-11

## 2024-12-10 NOTE — PROGRESS NOTES
"Chief Complaint  Diabetes and Hypertension    Subjective        Fernando Gauthier presents to [facility]  Diabetes    Hypertension        History of Present Illness  The patient is a 69-year-old male who presents for follow-up of coronary artery disease, diabetes, high cholesterol, CKD stage 3b, gout, and hypomagnesemia.    He has a history of coronary artery disease but reports no current chest pain. He does not experience claudication or leg swelling.    His diabetes is managed with glimepiride and daily insulin injections of 45 units. He continues to monitor his blood glucose levels, which are generally well-controlled, although he experiences occasional hypoglycemic episodes with readings as low as 68 or 69. He notes that his blood sugar levels can spike to around 300 after consuming 2 boiled eggs but subsequently normalize.    He has a history of high cholesterol.    He has a history of CKD stage 3b.    He has a history of gout but is currently asymptomatic.    He has a history of hypomagnesemia.    MEDICATIONS  Glimepiride, insulin, atorvastatin, Norvasc, bisoprolol, Imdur, ezetimibe.       Objective   Vital Signs:  /70 (BP Location: Left arm, Patient Position: Sitting, Cuff Size: Adult)   Pulse 74   Temp 98 °F (36.7 °C) (Temporal)   Resp 14   Ht 167.6 cm (66\")   Wt 79.4 kg (175 lb)   SpO2 98%   BMI 28.25 kg/m²   Estimated body mass index is 28.25 kg/m² as calculated from the following:    Height as of this encounter: 167.6 cm (66\").    Weight as of this encounter: 79.4 kg (175 lb).    BMI is >= 25 and <30. (Overweight) The following options were offered after discussion;: weight loss educational material (shared in after visit summary), exercise counseling/recommendations, and nutrition counseling/recommendations       Physical Exam  Constitutional:       General: He is not in acute distress.     Appearance: Normal appearance. He is not ill-appearing, toxic-appearing " or diaphoretic.   HENT:      Head: Normocephalic and atraumatic.      Right Ear: There is no impacted cerumen.      Left Ear: There is no impacted cerumen.      Nose: No congestion or rhinorrhea.      Mouth/Throat:      Pharynx: Oropharynx is clear. No oropharyngeal exudate or posterior oropharyngeal erythema.   Eyes:      General: No scleral icterus.        Right eye: No discharge.         Left eye: No discharge.      Extraocular Movements: Extraocular movements intact.      Conjunctiva/sclera: Conjunctivae normal.      Pupils: Pupils are equal, round, and reactive to light.   Cardiovascular:      Rate and Rhythm: Normal rate and regular rhythm.      Pulses: Normal pulses.      Heart sounds: Normal heart sounds.   Pulmonary:      Effort: Pulmonary effort is normal.      Breath sounds: Normal breath sounds.   Abdominal:      General: Abdomen is flat. Bowel sounds are normal.      Palpations: Abdomen is soft.   Musculoskeletal:         General: Normal range of motion.      Cervical back: Normal range of motion and neck supple.   Skin:     General: Skin is warm.   Neurological:      General: No focal deficit present.      Mental Status: He is alert and oriented to person, place, and time. Mental status is at baseline.   Psychiatric:         Mood and Affect: Mood normal.         Behavior: Behavior normal.         Thought Content: Thought content normal.         Judgment: Judgment normal.                    Result Review :    Results  Laboratory Studies  Hemoglobin A1c was 13.3 on 05/21/2024. Uric acid was 13.2. Magnesium was 1.4. Phosphorus was normal. PTH was normal. Total cholesterol was 162, triglycerides were 313, HDL was 31, and LDL was 80. Creatinine was 1.6, EGFR was 46. Proteinuria was present.              Assessment and Plan   Diagnoses and all orders for this visit:    1. Type 2 diabetes mellitus with hyperglycemia, with long-term current use of insulin (Primary)  -     Basic Metabolic Panel  -      Hemoglobin A1c  -     Microalbumin / Creatinine Urine Ratio - Urine, Clean Catch; Future    2. Peripheral vascular disease    3. Mixed hyperlipidemia  -     Lipid Panel  -     Hepatic Function Panel; Future    4. Hypomagnesemia    5. Hepatic steatosis    6. Coronary arteriosclerosis    7. CKD stage 3b, GFR 30-44 ml/min  -     Magnesium  -     PTH, Intact    8. 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 History of tobacco use  -     CT Chest Low Dose Wo; Future    10. Major depression in remission        Assessment & Plan  1. Coronary artery disease.  He is currently stable with no chest pain reported. He is taking Norvasc 10 mg daily, bisoprolol 5 mg daily, and Imdur 30 mg daily. A CT scan of the chest will be ordered.    2. Diabetes mellitus.  His last hemoglobin A1c on 05/21/2024 was 13.3, indicating poor glycemic control. He reports fluctuating blood sugar levels, sometimes dropping to 68-69 and spiking to 300. He is currently on glimepiride and insulin 45 units daily. A BMP, A1c, and microalbumin test will be conducted.    3. Hypercholesterolemia.  His total cholesterol was 162, triglycerides were 313, HDL was 31, and LDL was 80. He is taking atorvastatin 80 mg daily and ezetimibe. A cholesterol panel will be ordered.    4. Chronic kidney disease stage 3b.  His creatinine was 1.6, and EGFR was 46. He had some proteinuria. A BMP and microalbumin test will be conducted.    5. Gout.  He has a history of gout but is currently asymptomatic. No changes in treatment are necessary at this time.    6. Hypomagnesemia.  His magnesium level was 1.4. A magnesium test will be ordered.    Follow-up  The patient will follow up in 1 month.          Follow Up   Return in about 1 month (around 1/10/2025).  Patient was given instructions and counseling regarding his condition or for health maintenance advice. Please see specific information pulled into the AVS if appropriate.         Patient or patient  representative verbalized consent for the use of Ambient Listening during the visit with  Jeff Keating MD for chart documentation. 12/10/2024  09:18 EST        No

## 2024-12-26 ENCOUNTER — HOSPITAL ENCOUNTER (OUTPATIENT)
Dept: CT IMAGING | Facility: HOSPITAL | Age: 69
Discharge: HOME | End: 2024-12-26
Admitting: FAMILY MEDICINE
Payer: MEDICARE

## 2024-12-26 DIAGNOSIS — Z87.891 HISTORY OF TOBACCO USE: ICD-10-CM

## 2024-12-26 PROCEDURE — 71271 CT THORAX LUNG CANCER SCR C-: CPT

## 2025-01-13 ENCOUNTER — OFFICE VISIT (OUTPATIENT)
Age: 70
End: 2025-01-13
Payer: MEDICARE

## 2025-01-13 VITALS
RESPIRATION RATE: 14 BRPM | BODY MASS INDEX: 28.12 KG/M2 | OXYGEN SATURATION: 97 % | HEIGHT: 66 IN | TEMPERATURE: 97 F | WEIGHT: 175 LBS | HEART RATE: 55 BPM | DIASTOLIC BLOOD PRESSURE: 74 MMHG | SYSTOLIC BLOOD PRESSURE: 130 MMHG

## 2025-01-13 DIAGNOSIS — Z79.4 TYPE 2 DIABETES MELLITUS WITH HYPERGLYCEMIA, WITH LONG-TERM CURRENT USE OF INSULIN: ICD-10-CM

## 2025-01-13 DIAGNOSIS — E11.65 TYPE 2 DIABETES MELLITUS WITH HYPERGLYCEMIA, WITH LONG-TERM CURRENT USE OF INSULIN: ICD-10-CM

## 2025-01-13 DIAGNOSIS — E11.9 TYPE 2 DIABETES MELLITUS WITHOUT COMPLICATION, WITHOUT LONG-TERM CURRENT USE OF INSULIN: Primary | ICD-10-CM

## 2025-01-13 DIAGNOSIS — N18.32 CKD STAGE 3B, GFR 30-44 ML/MIN: ICD-10-CM

## 2025-01-13 DIAGNOSIS — R91.8 PULMONARY NODULES: ICD-10-CM

## 2025-01-13 PROCEDURE — G2211 COMPLEX E/M VISIT ADD ON: HCPCS | Performed by: FAMILY MEDICINE

## 2025-01-13 PROCEDURE — 1126F AMNT PAIN NOTED NONE PRSNT: CPT | Performed by: FAMILY MEDICINE

## 2025-01-13 PROCEDURE — 1160F RVW MEDS BY RX/DR IN RCRD: CPT | Performed by: FAMILY MEDICINE

## 2025-01-13 PROCEDURE — 1159F MED LIST DOCD IN RCRD: CPT | Performed by: FAMILY MEDICINE

## 2025-01-13 PROCEDURE — 99214 OFFICE O/P EST MOD 30 MIN: CPT | Performed by: FAMILY MEDICINE

## 2025-01-13 NOTE — PROGRESS NOTES
"Chief Complaint  Diabetes and review Low Dose    Subjective        Fernando Gauthier presents to [facility]  Diabetes      History of Present Illness  The patient presents for a follow-up of his CT scan, diabetes, chronic kidney disease stage 3b, and heart disease.    He underwent a lung cancer screening on 12/26/2024, which revealed several sub-5 mm noncalcified pulmonary nodules. He reports no chest pain or heartburn.    His last laboratory tests were conducted some time ago. His blood glucose level was slightly elevated this morning at 140. He has been using a continuous glucose monitor, which he removed approximately 10 days ago. The monitor indicates that his blood glucose levels have been within the range of 70 to 180 for 82 percent of the time since its initiation. He reports experiencing cold feet but otherwise has no significant issues.    He has an upcoming appointment with his cardiologist, Dr. Moore, scheduled for 03/04/2025.       Objective   Vital Signs:  /74 (BP Location: Left arm, Patient Position: Sitting, Cuff Size: Adult)   Pulse 55   Temp 97 °F (36.1 °C) (Temporal)   Resp 14   Ht 167.6 cm (65.98\")   Wt 79.4 kg (175 lb)   SpO2 97%   BMI 28.26 kg/m²   Estimated body mass index is 28.26 kg/m² as calculated from the following:    Height as of this encounter: 167.6 cm (65.98\").    Weight as of this encounter: 79.4 kg (175 lb).            Physical Exam  Constitutional:       General: He is not in acute distress.     Appearance: Normal appearance. He is not ill-appearing, toxic-appearing or diaphoretic.   HENT:      Head: Normocephalic and atraumatic.      Right Ear: There is no impacted cerumen.      Left Ear: There is no impacted cerumen.      Nose: No congestion or rhinorrhea.      Mouth/Throat:      Pharynx: Oropharynx is clear. No oropharyngeal exudate or posterior oropharyngeal erythema.   Eyes:      General: No scleral icterus.        Right eye: No " discharge.         Left eye: No discharge.      Extraocular Movements: Extraocular movements intact.      Conjunctiva/sclera: Conjunctivae normal.      Pupils: Pupils are equal, round, and reactive to light.   Cardiovascular:      Rate and Rhythm: Normal rate and regular rhythm.      Pulses: Normal pulses.      Heart sounds: Normal heart sounds.   Pulmonary:      Effort: Pulmonary effort is normal.      Breath sounds: Normal breath sounds.   Abdominal:      General: Abdomen is flat. Bowel sounds are normal.      Palpations: Abdomen is soft.   Musculoskeletal:         General: Normal range of motion.      Cervical back: Normal range of motion and neck supple.   Skin:     General: Skin is warm.   Neurological:      General: No focal deficit present.      Mental Status: He is alert and oriented to person, place, and time. Mental status is at baseline.   Psychiatric:         Mood and Affect: Mood normal.         Behavior: Behavior normal.         Thought Content: Thought content normal.         Judgment: Judgment normal.                                                                                    Diabetic foot exam:   Left: Filament test present   Pulses Dorsalis Pedis:  diminished  Posterior Tibial:  diminished   Reflexes absent    Vibratory sensation diminished   Proprioception diminished   Sharp/dull discrimination diminished       Right: Filament test absent   Pulses Dorsalis Pedis:  diminished   Reflexes 2+    Vibratory sensation diminished   Proprioception diminished   Sharp/dull discrimination diminished     Result Review :    Results  Laboratory Studies  Triglycerides were 313, HDL was 31, LDL was 80. Last A1c was 13.3.    Imaging  CT scan showed several sub-5 mm noncalcified pulmonary nodules.              Assessment and Plan   Diagnoses and all orders for this visit:    1. Type 2 diabetes mellitus without complication, without long-term current use of insulin (Primary)  -     Ambulatory Referral for  Diabetic Eye Exam-Ophthalmology    2. Pulmonary nodules    3. Type 2 diabetes mellitus with hyperglycemia, with long-term current use of insulin    4. CKD stage 3b, GFR 30-44 ml/min        Assessment & Plan  1. Pulmonary nodules.  The patient has several sub-5 mm noncalcified pulmonary nodules identified on the lung cancer screening done on 12/26/2024. A repeat CT scan is recommended in 6 months to monitor for any changes in size.    2. Chronic kidney disease stage 3b.  Blood work will be conducted today to assess BUN and creatinine levels.    3. Diabetes mellitus.  The patient's last A1c was 13.3, indicating poorly controlled diabetes. He reports his blood sugar was 140 this morning. He has been using a continuous glucose monitor, which shows his blood sugar levels have been between 70 and 180 for 82% of the time. Hemoglobin A1c will be checked today. If the A1c remains high, further adjustments to his diabetes management plan will be considered.    4. Heart disease.  The patient has known heart disease but reports no chest pain or heartburn. He has an appointment with his cardiologist on 03/04/2025.    Follow-up  The patient will follow up in 2 weeks to recheck his hemoglobin A1c unless the lab results are normal, in which case the appointment will be canceled.          Follow Up   Return in about 2 weeks (around 1/27/2025).  Patient was given instructions and counseling regarding his condition or for health maintenance advice. Please see specific information pulled into the AVS if appropriate.         Patient or patient representative verbalized consent for the use of Ambient Listening during the visit with  Jeff Keating MD for chart documentation. 1/13/2025  08:22 EST

## 2025-01-14 LAB
ALBUMIN SERPL-MCNC: 4.5 G/DL (ref 3.9–4.9)
ALP SERPL-CCNC: 51 IU/L (ref 44–121)
ALT SERPL-CCNC: 28 IU/L (ref 0–44)
AST SERPL-CCNC: 26 IU/L (ref 0–40)
BILIRUB DIRECT SERPL-MCNC: 0.32 MG/DL (ref 0–0.4)
BILIRUB SERPL-MCNC: 0.9 MG/DL (ref 0–1.2)
BUN SERPL-MCNC: 26 MG/DL (ref 8–27)
BUN/CREAT SERPL: 17 (ref 10–24)
CALCIUM SERPL-MCNC: 9.8 MG/DL (ref 8.6–10.2)
CHLORIDE SERPL-SCNC: 101 MMOL/L (ref 96–106)
CHOLEST SERPL-MCNC: 95 MG/DL (ref 100–199)
CO2 SERPL-SCNC: 23 MMOL/L (ref 20–29)
CREAT SERPL-MCNC: 1.5 MG/DL (ref 0.76–1.27)
EGFRCR SERPLBLD CKD-EPI 2021: 50 ML/MIN/1.73
GLUCOSE SERPL-MCNC: 167 MG/DL (ref 70–99)
HBA1C MFR BLD: 8.5 % (ref 4.8–5.6)
HDLC SERPL-MCNC: 28 MG/DL
LDLC SERPL CALC-MCNC: 41 MG/DL (ref 0–99)
MAGNESIUM SERPL-MCNC: 1.5 MG/DL (ref 1.6–2.3)
POTASSIUM SERPL-SCNC: 4.1 MMOL/L (ref 3.5–5.2)
PROT SERPL-MCNC: 6.6 G/DL (ref 6–8.5)
PTH-INTACT SERPL-MCNC: 23 PG/ML (ref 15–65)
SODIUM SERPL-SCNC: 140 MMOL/L (ref 134–144)
TRIGL SERPL-MCNC: 150 MG/DL (ref 0–149)
VLDLC SERPL CALC-MCNC: 26 MG/DL (ref 5–40)

## 2025-01-28 ENCOUNTER — OFFICE VISIT (OUTPATIENT)
Age: 70
End: 2025-01-28
Payer: MEDICARE

## 2025-01-28 VITALS
HEART RATE: 76 BPM | SYSTOLIC BLOOD PRESSURE: 140 MMHG | RESPIRATION RATE: 17 BRPM | OXYGEN SATURATION: 98 % | TEMPERATURE: 97.2 F | WEIGHT: 175 LBS | BODY MASS INDEX: 28.12 KG/M2 | DIASTOLIC BLOOD PRESSURE: 74 MMHG | HEIGHT: 66 IN

## 2025-01-28 DIAGNOSIS — E11.649 TYPE 2 DIABETES MELLITUS WITH HYPOGLYCEMIA WITHOUT COMA, WITH LONG-TERM CURRENT USE OF INSULIN: Primary | ICD-10-CM

## 2025-01-28 DIAGNOSIS — E78.2 MIXED HYPERLIPIDEMIA: ICD-10-CM

## 2025-01-28 DIAGNOSIS — Z23 IMMUNIZATION DUE: ICD-10-CM

## 2025-01-28 DIAGNOSIS — Z79.4 TYPE 2 DIABETES MELLITUS WITH HYPOGLYCEMIA WITHOUT COMA, WITH LONG-TERM CURRENT USE OF INSULIN: Primary | ICD-10-CM

## 2025-01-28 DIAGNOSIS — F51.01 PRIMARY INSOMNIA: ICD-10-CM

## 2025-01-28 DIAGNOSIS — E83.42 HYPOMAGNESEMIA: ICD-10-CM

## 2025-01-28 DIAGNOSIS — N18.32 CKD STAGE 3B, GFR 30-44 ML/MIN: ICD-10-CM

## 2025-01-28 PROBLEM — E79.0 HYPERURICEMIA WITHOUT SIGNS OF INFLAMMATORY ARTHRITIS AND TOPHACEOUS DISEASE: Status: ACTIVE | Noted: 2024-06-11

## 2025-01-28 PROCEDURE — 1159F MED LIST DOCD IN RCRD: CPT | Performed by: FAMILY MEDICINE

## 2025-01-28 PROCEDURE — 99214 OFFICE O/P EST MOD 30 MIN: CPT | Performed by: FAMILY MEDICINE

## 2025-01-28 PROCEDURE — 1160F RVW MEDS BY RX/DR IN RCRD: CPT | Performed by: FAMILY MEDICINE

## 2025-01-28 PROCEDURE — 1126F AMNT PAIN NOTED NONE PRSNT: CPT | Performed by: FAMILY MEDICINE

## 2025-01-28 PROCEDURE — 3052F HG A1C>EQUAL 8.0%<EQUAL 9.0%: CPT | Performed by: FAMILY MEDICINE

## 2025-01-28 PROCEDURE — G2211 COMPLEX E/M VISIT ADD ON: HCPCS | Performed by: FAMILY MEDICINE

## 2025-01-28 RX ORDER — MAGNESIUM OXIDE 400 MG/1
400 TABLET ORAL DAILY
Qty: 180 TABLET | Refills: 3 | Status: SHIPPED | OUTPATIENT
Start: 2025-01-28

## 2025-01-28 NOTE — PROGRESS NOTES
"Chief Complaint  Diabetes (Pt is here to go over recent blood work)    Subjective        Fernando Gauthier presents to [facility]  Diabetes        History of Present Illness  The patient presents for evaluation of diabetes, coronary artery disease, hypomagnesemia, and sleep disturbance.    He reports a significant improvement in his blood glucose levels, with no current need for additional medications.    He is not experiencing any chest discomfort or shortness of breath during physical activity.    He admits to mild snoring and a preference for daytime sleep over nighttime rest. He often remains awake until 4:00 or 5:00 in the morning, attributing this pattern to the loss of his wife 7 years ago. Despite feeling fatigued, he struggles with insomnia but feels refreshed after a few hours of sleep. His sleep schedule typically involves going to bed around 3:00 or 4:00 AM and waking up at 6:30 AM for work. He also mentions the ability to fall asleep easily during the day.    He is currently not on any magnesium supplementation.    SOCIAL HISTORY  He has a stepdaughter.    IMMUNIZATIONS  He needs Shingrix vaccine and COVID-19 vaccine.       Objective   Vital Signs:  /74 (BP Location: Right arm, Patient Position: Sitting, Cuff Size: Adult)   Pulse 76   Temp 97.2 °F (36.2 °C) (Oral)   Resp 17   Ht 167.6 cm (65.98\")   Wt 79.4 kg (175 lb)   SpO2 98%   BMI 28.26 kg/m²   Estimated body mass index is 28.26 kg/m² as calculated from the following:    Height as of this encounter: 167.6 cm (65.98\").    Weight as of this encounter: 79.4 kg (175 lb).            Physical Exam  Constitutional:       General: He is not in acute distress.     Appearance: Normal appearance. He is not ill-appearing, toxic-appearing or diaphoretic.   HENT:      Head: Normocephalic and atraumatic.      Right Ear: There is no impacted cerumen.      Left Ear: There is no impacted cerumen.      Nose: No congestion or " rhinorrhea.      Mouth/Throat:      Pharynx: Oropharynx is clear. No oropharyngeal exudate or posterior oropharyngeal erythema.   Eyes:      General: No scleral icterus.        Right eye: No discharge.         Left eye: No discharge.      Extraocular Movements: Extraocular movements intact.      Conjunctiva/sclera: Conjunctivae normal.      Pupils: Pupils are equal, round, and reactive to light.   Cardiovascular:      Rate and Rhythm: Normal rate and regular rhythm.      Pulses: Normal pulses.      Heart sounds: Normal heart sounds.   Pulmonary:      Effort: Pulmonary effort is normal.      Breath sounds: Normal breath sounds.   Abdominal:      General: Abdomen is flat. Bowel sounds are normal.      Palpations: Abdomen is soft.   Musculoskeletal:         General: Normal range of motion.      Cervical back: Normal range of motion and neck supple.   Skin:     General: Skin is warm.   Neurological:      General: No focal deficit present.      Mental Status: He is alert and oriented to person, place, and time. Mental status is at baseline.   Psychiatric:         Mood and Affect: Mood normal.         Behavior: Behavior normal.         Thought Content: Thought content normal.         Judgment: Judgment normal.                    Result Review :    Results  Laboratory Studies  Hemoglobin A1c was 8.5. Magnesium level was 1.5.              Assessment and Plan   Diagnoses and all orders for this visit:    1. Type 2 diabetes mellitus with hypoglycemia without coma, with long-term current use of insulin (Primary)    2. CKD stage 3b, GFR 30-44 ml/min    3. Hypomagnesemia  -     magnesium oxide (MAG-OX) 400 MG tablet; Take 1 tablet by mouth Daily.  Dispense: 180 tablet; Refill: 3    4. Primary insomnia    5. Mixed hyperlipidemia    6. Immunization due  -     Shingrix Vaccine    Other orders  -     COVID-19 (Pfizer) 12yrs+ ([place])        Assessment & Plan  1. Diabetes Mellitus.  His blood glucose levels have shown significant  improvement, with hemoglobin A1c decreasing from 13.3 to 8.5. He will continue his current medication regimen. A follow-up appointment is scheduled in 4 months to repeat the A1c test.    2. Coronary Artery Disease.  His condition remains stable, with no reported chest pain or shortness of breath during physical activity. He will continue his current medication regimen.    3. Hypomagnesemia.  His magnesium level was noted to be low at 1.5. A prescription for magnesium supplementation will be provided to address this deficiency.    4. Sleep Disturbance.  He reports difficulty sleeping at night and better sleep during the day. This issue has persisted since his wife's passing 7 years ago. No immediate intervention is planned, but further evaluation may be considered if symptoms persist.    5. Health Maintenance.  He needs to receive the Shingrix and COVID-19 vaccines. He was advised to get these vaccines at his pharmacy as Medicare will not cover them at the clinic.    Follow-up  The patient will follow up in 4 months.          Follow Up   Return in about 3 months (around 4/28/2025).  Patient was given instructions and counseling regarding his condition or for health maintenance advice. Please see specific information pulled into the AVS if appropriate.         Patient or patient representative verbalized consent for the use of Ambient Listening during the visit with  Jeff Keating MD for chart documentation. 1/28/2025  08:33 EST

## 2025-03-04 ENCOUNTER — OFFICE VISIT (OUTPATIENT)
Dept: CARDIOLOGY | Facility: CLINIC | Age: 70
End: 2025-03-04
Payer: MEDICARE

## 2025-03-04 VITALS
SYSTOLIC BLOOD PRESSURE: 160 MMHG | DIASTOLIC BLOOD PRESSURE: 70 MMHG | BODY MASS INDEX: 28.28 KG/M2 | HEIGHT: 66 IN | HEART RATE: 65 BPM | WEIGHT: 176 LBS

## 2025-03-04 DIAGNOSIS — I25.10 CORONARY ARTERY DISEASE INVOLVING NATIVE CORONARY ARTERY OF NATIVE HEART WITHOUT ANGINA PECTORIS: Primary | ICD-10-CM

## 2025-03-04 DIAGNOSIS — E78.5 HYPERLIPIDEMIA LDL GOAL <55: ICD-10-CM

## 2025-03-04 DIAGNOSIS — I25.2 HISTORY OF ST ELEVATION MYOCARDIAL INFARCTION (STEMI): ICD-10-CM

## 2025-03-04 PROCEDURE — 1160F RVW MEDS BY RX/DR IN RCRD: CPT | Performed by: NURSE PRACTITIONER

## 2025-03-04 PROCEDURE — 99214 OFFICE O/P EST MOD 30 MIN: CPT | Performed by: NURSE PRACTITIONER

## 2025-03-04 PROCEDURE — 93000 ELECTROCARDIOGRAM COMPLETE: CPT | Performed by: NURSE PRACTITIONER

## 2025-03-04 PROCEDURE — 1159F MED LIST DOCD IN RCRD: CPT | Performed by: NURSE PRACTITIONER

## 2025-03-04 RX ORDER — SYRINGE-NEEDLE,INSULIN,0.5 ML 27GX1/2"
SYRINGE, EMPTY DISPOSABLE MISCELLANEOUS
Qty: 90 EACH | Refills: 3 | Status: SHIPPED | OUTPATIENT
Start: 2025-03-04

## 2025-03-04 NOTE — PROGRESS NOTES
Date of Office Visit: 25  Encounter Provider: ALAN Apodaca  Place of Service: [facility] CARDIOLOGY  Patient Name: Fernando Gauthier  :1955    Chief Complaint   Patient presents with    Coronary artery disease involving native coronary artery of    S/P drug eluting coronary stent placement    Follow-up   :     HPI: Fernando Gauthier is a 69 y.o. male  with hypertension, hyperlipidemia, type 2 diabetes mellitus, mitral regurgitation, coronary artery disease status post multiple coronary stent placement, myocardial infarction, peripheral artery disease, and bilateral carotid artery plaque.  He is a former smoker who quit 2024.    He is followed by Dr. Fran Giordano.   He was previously followed by Dr. Jose M Osullivan.  I will visit with him in follow up  today and have reviewed his medical record.     He previously had angioplasty to multiple areas of the LAD back in 2001 and then recurrent chest discomfort 2004 where he had angioplasty and stent placed to the marginal. In  2008, he had recurrent cardiac symptoms perfusion stress test was unremarkable proceeded with cardiac catheterization where he had angioplasty stent placement of the circumflex as well as to the proximal LAD.  Then in  he presented with progressive dyspnea and was found to have a total occlusion of the nondominant right coronary artery,not amenable to angioplasty and he was treated medically.    In 10/3/2019, he complained of intermittent chest tightness associated with nausea diaphoresis and shortness of breath.  He had cardiac catheterization completed 10/7/2019 which did not show any new revascularization target.  Imdur was added to his regimen.  He later was found to have exercise-induced claudication particularly in the right lower extremity.        He then developed brain fog and left-sided numbness and tingling with increased shortness of breath and chest pressure  "while out on the golf course with his grandson who is a .  His grand son called EMS and he was brought emergently to [facility].  Initial EKG showed evidence of ST elevation compatible with STEMI in May 2022 and had significant lesion of the mid LAD and mid circumflex.  He received PCI of the mid circumflex with drug-eluting stent as well as PCI of the ostial to mid LAD with overlapping stents x2 and successful PCI of the mid to distal LAD with 2 additional stents which were postdilated.  Dual antiplatelet therapy was recommended lifelong.  Follow-up echo June 2022 showed normal left ventricular systolic function and normal wall motion postprocedure.     He presents today for reassessment.  He quit smoking in November around Thanksgiving.  He has very minimal craving.  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  He also golfs.  He denies chest pain shortness of breath or edema dizziness or palpitation.  No blood in the urine or stool.  He reportedly is feeling better with more energy and less shortness of breath since he stopped smoking.          Allergies   Allergen Reactions    Metformin Diarrhea           Family and social history reviewed.     ROS  All other systems were reviewed and are negative          Objective:     Vitals:    03/04/25 0812   BP: 160/70   BP Location: Left arm   Patient Position: Sitting   Cuff Size: Adult   Pulse: 65   Weight: 79.8 kg (176 lb)   Height: 167.6 cm (66\")     Body mass index is 28.41 kg/m².    PHYSICAL EXAM:  Pulmonary:      Effort: Pulmonary effort is normal.      Breath sounds: Normal breath sounds.   Cardiovascular:      Normal rate. Regular rhythm.           ECG 12 Lead    Date/Time: 3/4/2025 8:25 AM  Performed by: Melony Singh APRN    Authorized by: Melony Singh APRN  Comparison: compared with previous ECG   Similar to previous ECG  Rhythm: sinus rhythm  Rate: normal  Conduction: 1st degree AV block  T " flattening: aVL  QRS axis: normal            Current Outpatient Medications   Medication Sig Dispense Refill    allopurinol (ZYLOPRIM) 300 MG tablet Take 1 tablet by mouth Daily. 30 tablet 0    amLODIPine (NORVASC) 10 MG tablet amlodipine 10 mg tablet daily      aspirin (aspirin) 81 MG EC tablet Take 1 tablet by mouth Daily. 30 tablet 6    atorvastatin (LIPITOR) 80 MG tablet Take 1 tablet by mouth Daily. 30 tablet 11    bisoprolol (ZEBeta) 5 MG tablet Take 1 tablet by mouth Daily. 90 tablet 0    Blood Glucose Monitoring Suppl (OneTouch Verio) w/Device kit 1 each Take As Directed. Use to test blood sugar up to 4 times daily as needed 1 kit 0    chlorthalidone (HYGROTON) 25 MG tablet Take 1 tablet by mouth Daily.      citalopram (CeleXA) 40 MG tablet citalopram 40 mg tablet   Take 1 tablet every day by oral route.      clopidogrel (PLAVIX) 75 MG tablet Take 1 tablet by mouth Daily. 90 tablet 3    ezetimibe (ZETIA) 10 MG tablet       folic acid (FOLVITE) 1 MG tablet folic acid 1 mg tablet   TAKE ONE TABLET BY MOUTH ONCE DAILY (Patient taking differently: Take 1 tablet by mouth 2 (Two) Times a Day.)      glimepiride (AMARYL) 2 MG tablet Take 1 tablet by mouth 2 (Two) Times a Day.      glucose blood (OneTouch Verio) test strip Use to test blood sugar up to 4 times daily as needed 100 each 12    Insulin Glargine (Lantus SoloStar) 100 UNIT/ML injection pen Inject 35 Units under the skin into the appropriate area as directed Every Night (Patient taking differently: Inject 45 Units under the skin into the appropriate area as directed Every Night.) 15 mL 0    Insulin Pen Needle (Pen Needles) 32G X 4 MM misc Inject 1 each under the skin into the appropriate area as directed Daily. Formulary Compliance Approval 100 each 0    isosorbide mononitrate (IMDUR) 30 MG 24 hr tablet Take 1 tablet by mouth Every Morning. 90 tablet 0    Lancets misc Use to test blood glucose up to four times daily as needed. Formulary Compliance Approval.  Diagnosis: Type 2 Diabetes - Insulin Dependent 100 each 0    losartan (COZAAR) 100 MG tablet TAKE 1 TABLET BY MOUTH EVERY DAY 90 tablet 3    magnesium oxide (MAG-OX) 400 MG tablet Take 1 tablet by mouth Daily. 180 tablet 3    nitroglycerin (NITROSTAT) 0.4 MG SL tablet Place 1 tablet under the tongue Every 5 (Five) Minutes As Needed for Chest Pain. Take no more than 3 doses in 15 minutes. 30 tablet 1    Omega-3 Fatty Acids (OMEGA 3 500 PO) Take  by mouth. 8 Tablets daily      sildenafil (Viagra) 100 MG tablet Take 1 daily as needed for erectile dysfunction 90 tablet 3    VITAMIN D PO Take  by mouth Daily.       No current facility-administered medications for this visit.     Assessment:       Diagnosis Plan   1. Coronary artery disease involving native coronary artery of native heart without angina pectoris  ECG 12 Lead      2. History of ST elevation myocardial infarction (STEMI)  ECG 12 Lead      3. Hyperlipidemia LDL goal <55  ECG 12 Lead           Orders Placed This Encounter   Procedures    ECG 12 Lead     This order was created via procedure documentation     Order Specific Question:   Release to patient     Answer:   Routine Release [[id number]]         Plan:         1. 69 year old gentleman history of severe coronary artery disease.  Multiple interventions including angioplasty, bare metal stent placements, and drug eluding stent placements to the left anterior descending and circumflex.  Last cardiac catheterization in 2010 showed normal left ventricular systolic function in significant disease left anterior descending and circumflex with no evidence of restenosis but total occlusion of the nondominant right coronary artery.  Echocardiogram in 2012 normal LV systolic function.  Then recurrent symptoms repeat catheterization October 2019 all stents widely patent.  Insignificant FFR of mid circumflex stent.  Right coronary artery 100% occluded proximally with collateral flow.  Treated medically then new sudden  symptoms, STEMI May 2022 status post PCI of the mid circumflex and PCI of the ostial to mid LAD as well as the distal LAD.   Follow-up echo June 2022 showed normal left ventricular systolic function and normal wall motion postprocedure.  - no angina or exertional issues.  - continue atorvastatin and dual antiplatelet therapy lifelong.  He has no bleeding issues     2. Diabetes mellitus type II on insulin -his hemoglobin A1c was 8.5 January 2025   3.  Hypertension blood pressure elevated today but reportedly better at home.  He reports his blood pressure at home is usually 1 15-1 20s 5 over 70-80.  4.  Mitral regurgitation-mild on echo September 2018 but no significant regurgitation on repeat echo June 2022.  5.  Bilateral carotid plaque no stenosis on duplex May 2021.  Faint right carotid bruit appreciated today  6.  Hyperlipidemia on high dose atorvastatin 80 mg, zetia and omega 3 fatty acid. target LDL 70 or less.  His LDL in January was 41 with low HDL at 28, total cholesterol 95 and triglycerides 150  7. History of renal cancer s/p partial left nephrectomy-follows with nephrology   8.  Former tobacco smoker-he quit in November 2024.  He has improved energy and less shortness of breath now that he has stopped smoking.  I commended him for this and encouraged continued abstinence.  9.  Peripheral arterial disease.  He has exercise-induced claudication in the right lower extremity.  Abnormal CTA with runoff June 2021-reportedly stable  10. CKD-creatinine 1.50 January 2025 with GFR 50  11.  Enlarged mesenteric lymph nodes noted on CT abdomen June 2021.  This decreased in size by repeat December 2021 and was felt to be likely benign reactive etiology.   12.  Erectile dysfunction. PRN viagra  13.  Abdominal atherosclerotic calcification, moderate on duplex December 2022-continue dual antiplatelet therapy and high intensity statin  14.  Pulmonary nodules-stable on CT low-dose December 2024  15. Hypomagnesia-he has not  started magnesium supplementation.  I reviewed with pharmacy that his medication was sent to and he will pick it up.  16.  First-degree AV block, chronic    Follow-up in 6 months with cardiology and call sooner with any questions or concerns            It has been a pleasure to participate in this patient's care.      Thank you,  ALAN Apodaca      **I used Dragon to dictate this note:**

## 2025-04-02 RX ORDER — BISOPROLOL FUMARATE 5 MG/1
5 TABLET, FILM COATED ORAL DAILY
Qty: 90 TABLET | Refills: 0 | Status: SHIPPED | OUTPATIENT
Start: 2025-04-02

## 2025-04-02 RX ORDER — ATORVASTATIN CALCIUM 80 MG/1
80 TABLET, FILM COATED ORAL DAILY
Qty: 90 TABLET | Refills: 0 | Status: SHIPPED | OUTPATIENT
Start: 2025-04-02

## 2025-04-02 RX ORDER — ISOSORBIDE MONONITRATE 30 MG/1
30 TABLET, EXTENDED RELEASE ORAL DAILY
Qty: 90 TABLET | Refills: 0 | Status: SHIPPED | OUTPATIENT
Start: 2025-04-02

## 2025-04-02 RX ORDER — EZETIMIBE 10 MG/1
10 TABLET ORAL DAILY
Qty: 90 TABLET | Refills: 0 | Status: SHIPPED | OUTPATIENT
Start: 2025-04-02

## 2025-04-02 RX ORDER — CLOPIDOGREL BISULFATE 75 MG/1
75 TABLET ORAL DAILY
Qty: 90 TABLET | Refills: 0 | Status: SHIPPED | OUTPATIENT
Start: 2025-04-02

## 2025-04-15 RX ORDER — CHLORTHALIDONE 25 MG/1
25 TABLET ORAL DAILY
Qty: 90 TABLET | Refills: 3 | Status: SHIPPED | OUTPATIENT
Start: 2025-04-15

## 2025-04-15 RX ORDER — AMLODIPINE BESYLATE 5 MG/1
5 TABLET ORAL DAILY
Qty: 90 TABLET | Refills: 3 | Status: SHIPPED | OUTPATIENT
Start: 2025-04-15

## 2025-04-15 RX ORDER — CITALOPRAM HYDROBROMIDE 40 MG/1
40 TABLET ORAL DAILY
Qty: 90 TABLET | Refills: 3 | Status: SHIPPED | OUTPATIENT
Start: 2025-04-15

## 2025-04-15 RX ORDER — LOSARTAN POTASSIUM 100 MG/1
100 TABLET ORAL DAILY
Qty: 90 TABLET | Refills: 3 | Status: SHIPPED | OUTPATIENT
Start: 2025-04-15

## 2025-04-21 RX ORDER — ALLOPURINOL 300 MG/1
300 TABLET ORAL DAILY
Qty: 90 TABLET | Refills: 3 | Status: SHIPPED | OUTPATIENT
Start: 2025-04-21

## 2025-05-28 ENCOUNTER — OFFICE VISIT (OUTPATIENT)
Age: 70
End: 2025-05-28
Payer: MEDICARE

## 2025-05-28 VITALS
HEIGHT: 66 IN | DIASTOLIC BLOOD PRESSURE: 74 MMHG | TEMPERATURE: 97.9 F | HEART RATE: 69 BPM | WEIGHT: 174 LBS | OXYGEN SATURATION: 97 % | BODY MASS INDEX: 27.97 KG/M2 | RESPIRATION RATE: 14 BRPM | SYSTOLIC BLOOD PRESSURE: 132 MMHG

## 2025-05-28 DIAGNOSIS — Z79.4 TYPE 2 DIABETES MELLITUS WITH HYPERGLYCEMIA, WITH LONG-TERM CURRENT USE OF INSULIN: ICD-10-CM

## 2025-05-28 DIAGNOSIS — E11.65 TYPE 2 DIABETES MELLITUS WITH HYPERGLYCEMIA, WITH LONG-TERM CURRENT USE OF INSULIN: Primary | ICD-10-CM

## 2025-05-28 DIAGNOSIS — I73.9 PERIPHERAL VASCULAR DISEASE: ICD-10-CM

## 2025-05-28 DIAGNOSIS — E11.65 TYPE 2 DIABETES MELLITUS WITH HYPERGLYCEMIA, WITH LONG-TERM CURRENT USE OF INSULIN: ICD-10-CM

## 2025-05-28 DIAGNOSIS — Z00.00 MEDICARE ANNUAL WELLNESS VISIT, SUBSEQUENT: Primary | ICD-10-CM

## 2025-05-28 DIAGNOSIS — E83.42 HYPOMAGNESEMIA: ICD-10-CM

## 2025-05-28 DIAGNOSIS — I25.10 CORONARY ARTERIOSCLEROSIS: ICD-10-CM

## 2025-05-28 DIAGNOSIS — Z79.4 TYPE 2 DIABETES MELLITUS WITH HYPERGLYCEMIA, WITH LONG-TERM CURRENT USE OF INSULIN: Primary | ICD-10-CM

## 2025-05-28 DIAGNOSIS — Z79.4 TYPE 2 DIABETES MELLITUS WITH HYPOGLYCEMIA WITHOUT COMA, WITH LONG-TERM CURRENT USE OF INSULIN: ICD-10-CM

## 2025-05-28 DIAGNOSIS — N18.32 CKD STAGE 3B, GFR 30-44 ML/MIN: ICD-10-CM

## 2025-05-28 DIAGNOSIS — E11.649 TYPE 2 DIABETES MELLITUS WITH HYPOGLYCEMIA WITHOUT COMA, WITH LONG-TERM CURRENT USE OF INSULIN: ICD-10-CM

## 2025-05-28 DIAGNOSIS — E78.2 MIXED HYPERLIPIDEMIA: ICD-10-CM

## 2025-05-28 RX ORDER — INSULIN ASPART 100 [IU]/ML
INJECTION, SOLUTION INTRAVENOUS; SUBCUTANEOUS
Qty: 24 EACH | Refills: 3 | Status: SHIPPED | OUTPATIENT
Start: 2025-05-28

## 2025-05-28 RX ORDER — DAPAGLIFLOZIN 10 MG/1
10 TABLET, FILM COATED ORAL DAILY
Qty: 30 TABLET | Refills: 0 | Status: SHIPPED | OUTPATIENT
Start: 2025-05-28

## 2025-05-28 RX ORDER — DAPAGLIFLOZIN 10 MG/1
10 TABLET, FILM COATED ORAL DAILY
Qty: 30 TABLET | Refills: 11 | Status: SHIPPED | OUTPATIENT
Start: 2025-05-28 | End: 2025-05-28

## 2025-05-28 NOTE — ASSESSMENT & PLAN NOTE
Orders:    Insulin Aspart (novoLOG) 100 UNIT/ML injection; Check blood sugars before meals and before bed.  Use the following sliding scale: 0-100 no insulin.  101-150 inject 5 units of insulin.  1 5 1-200 inject 10 units of insulin 201-250 inject 15 units of insulin greater than 250 inject 20 units of insulin.  A maximum of 80 units/day.    dapagliflozin Propanediol (Farxiga) 10 MG tablet; Take 10 mg by mouth Daily.

## 2025-05-28 NOTE — ASSESSMENT & PLAN NOTE
Orders:    Basic Metabolic Panel    CBC & Differential    Hemoglobin A1c    Microalbumin / Creatinine Urine Ratio - Urine, Clean Catch

## 2025-05-28 NOTE — PROGRESS NOTES
Subjective   The ABCs of the Annual Wellness Visit  Medicare Wellness Visit      Fernando Gauthier is a 69 y.o. patient who presents for a Medicare Wellness Visit.    The following portions of the patient's history were reviewed and   updated as appropriate: allergies, current medications, past family history, past medical history, past social history, past surgical history, and problem list.    Compared to one year ago, the patient's physical   health is the same.  Compared to one year ago, the patient's mental   health is the same.    Recent Hospitalizations:  He was not admitted to the hospital during the last year.     Current Medical Providers:  Patient Care Team:  Jeff Keating MD as PCP - General  Jeff Keating MD as PCP - Family Medicine  Jeff Keating MD as Referring Physician (Family Medicine)  Jason Conrad MD as Consulting Physician (Hematology and Oncology)  Josep Regalado MD as Consulting Physician (Nephrology)    Outpatient Medications Prior to Visit   Medication Sig Dispense Refill    allopurinol (ZYLOPRIM) 300 MG tablet TAKE ONE TABLET BY MOUTH EVERY DAY 90 tablet 3    amLODIPine (NORVASC) 5 MG tablet TAKE ONE TABLET BY MOUTH EVERY DAY 90 tablet 3    aspirin (aspirin) 81 MG EC tablet Take 1 tablet by mouth Daily. 30 tablet 6    atorvastatin (LIPITOR) 80 MG tablet TAKE ONE TABLET BY MOUTH EVERY DAY 90 tablet 0    bisoprolol (ZEBeta) 5 MG tablet TAKE ONE TABLET BY MOUTH EVERY DAY 90 tablet 0    chlorthalidone (HYGROTON) 25 MG tablet TAKE ONE TABLET BY MOUTH EVERY DAY 90 tablet 3    citalopram (CeleXA) 40 MG tablet TAKE ONE TABLET BY MOUTH EVERY DAY 90 tablet 3    clopidogrel (PLAVIX) 75 MG tablet TAKE ONE TABLET BY MOUTH ONCE DAILY 90 tablet 0    ezetimibe (ZETIA) 10 MG tablet TAKE ONE TABLET BY MOUTH EVERY DAY 90 tablet 0    folic acid (FOLVITE) 1 MG tablet folic acid 1 mg tablet   TAKE ONE TABLET BY MOUTH ONCE DAILY (Patient taking differently: Take 1 tablet by mouth 2 (Two) Times a  "Day.)      glimepiride (AMARYL) 2 MG tablet Take 1 tablet by mouth 2 (Two) Times a Day.      Insulin Glargine (Lantus SoloStar) 100 UNIT/ML injection pen Inject 35 Units under the skin into the appropriate area as directed Every Night 15 mL 0    Insulin Pen Needle (Pen Needles) 32G X 4 MM misc Inject 1 each under the skin into the appropriate area as directed Daily. Formulary Compliance Approval 100 each 0    Insulin Syringe 28G X 1/2\" 0.5 ML misc Use with Lantus to inject 45 units of insulin daily 90 each 3    isosorbide mononitrate (IMDUR) 30 MG 24 hr tablet TAKE ONE TABLET BY MOUTH EVERY DAY 90 tablet 0    Lancets misc Use to test blood glucose up to four times daily as needed. Formulary Compliance Approval. Diagnosis: Type 2 Diabetes - Insulin Dependent 100 each 0    losartan (COZAAR) 100 MG tablet TAKE ONE TABLET BY MOUTH EVERY DAY 90 tablet 3    magnesium oxide (MAG-OX) 400 MG tablet Take 1 tablet by mouth Daily. 180 tablet 3    nitroglycerin (NITROSTAT) 0.4 MG SL tablet Place 1 tablet under the tongue Every 5 (Five) Minutes As Needed for Chest Pain. Take no more than 3 doses in 15 minutes. 30 tablet 1    Omega-3 Fatty Acids (OMEGA 3 500 PO) Take  by mouth. 8 Tablets daily      sildenafil (Viagra) 100 MG tablet Take 1 daily as needed for erectile dysfunction 90 tablet 3    VITAMIN D PO Take  by mouth Daily.      Blood Glucose Monitoring Suppl (OneTouch Verio) w/Device kit 1 each Take As Directed. Use to test blood sugar up to 4 times daily as needed 1 kit 0    glucose blood (OneTouch Verio) test strip Use to test blood sugar up to 4 times daily as needed 100 each 12    amLODIPine (NORVASC) 10 MG tablet amlodipine 10 mg tablet daily       No facility-administered medications prior to visit.     No opioid medication identified on active medication list. I have reviewed chart for other potential  high risk medication/s and harmful drug interactions in the elderly.      Aspirin is on active medication list. " "Aspirin use is indicated based on review of current medical condition/s. Pros and cons of this therapy have been discussed today. Benefits of this medication outweigh potential harm.  Patient has been encouraged to continue taking this medication.  .      Patient Active Problem List   Diagnosis    Unstable angina    Normocytic anemia    Diabetes mellitus    ST elevation myocardial infarction (STEMI), unspecified artery    CKD stage 3b, GFR 30-44 ml/min    Type 2 diabetes mellitus with hyperglycemia    Coronary arteriosclerosis    Chronic gout of multiple sites    Mixed hyperlipidemia    History of renal cell cancer    Major depression in remission    Hepatic steatosis    Hypomagnesemia    Peripheral vascular disease    Hyperproteinemia    Gastroesophageal reflux disease with esophagitis without hemorrhage    History of tobacco use    Pulmonary nodules    Hyperuricemia without signs of inflammatory arthritis and tophaceous disease     Advance Care Planning Advance Directive is not on file.  ACP discussion was held with the patient during this visit. Patient has an advance directive (not in EMR), copy requested.            Objective   Vitals:    05/28/25 0821   BP: 132/74   BP Location: Left arm   Patient Position: Sitting   Cuff Size: Adult   Pulse: 69   Resp: 14   Temp: 97.9 °F (36.6 °C)   TempSrc: Temporal   SpO2: 97%   Weight: 78.9 kg (174 lb)   Height: 167.6 cm (65.98\")   PainSc: 0-No pain       Estimated body mass index is 28.1 kg/m² as calculated from the following:    Height as of this encounter: 167.6 cm (65.98\").    Weight as of this encounter: 78.9 kg (174 lb).                Does the patient have evidence of cognitive impairment? No                                                                                               Health  Risk Assessment    Smoking Status:  Social History     Tobacco Use   Smoking Status Former    Current packs/day: 0.00    Average packs/day: 0.5 packs/day for 52.8 years (26.4 " ttl pk-yrs)    Types: Cigarettes    Start date:     Quit date: 2024    Years since quittin.5    Passive exposure: Past (parents smoked in the home)   Smokeless Tobacco Never   Tobacco Comments    caffeine - 2-3 sodas daily     Alcohol Consumption:  Social History     Substance and Sexual Activity   Alcohol Use Not Currently    Comment: none       Fall Risk Screen  RENEADI Fall Risk Assessment was completed, and patient is at LOW risk for falls.Assessment completed on:2025    Depression Screening   Little interest or pleasure in doing things? Not at all   Feeling down, depressed, or hopeless? Not at all   PHQ-2 Total Score 0      Health Habits and Functional and Cognitive Screenin/28/2025     8:18 AM   Functional & Cognitive Status   Do you have difficulty preparing food and eating? No   Do you have difficulty bathing yourself, getting dressed or grooming yourself? No   Do you have difficulty using the toilet? No   Do you have difficulty moving around from place to place? No   Do you have trouble with steps or getting out of a bed or a chair? No   Current Diet Well Balanced Diet   Dental Exam Not up to date   Eye Exam Up to date   Exercise (times per week) 7 times per week   Current Exercises Include Walking;House Cleaning;Yard Work   Do you need help using the phone?  No   Are you deaf or do you have serious difficulty hearing?  No   Do you need help to go to places out of walking distance? No   Do you need help shopping? No   Do you need help preparing meals?  No   Do you need help with housework?  No   Do you need help with laundry? No   Do you need help taking your medications? No   Do you need help managing money? No   Do you ever drive or ride in a car without wearing a seat belt? No   Have you felt unusual stress, anger or loneliness in the last month? No   Who do you live with? Child   If you need help, do you have trouble finding someone available to you? No   Have you been bothered  in the last four weeks by sexual problems? No   Do you have difficulty concentrating, remembering or making decisions? No           Age-appropriate Screening Schedule:  Refer to the list below for future screening recommendations based on patient's age, sex and/or medical conditions. Orders for these recommended tests are listed in the plan section. The patient has been provided with a written plan.    Health Maintenance List  Health Maintenance   Topic Date Due    URINE MICROALBUMIN-CREATININE RATIO (uACR)  Never done    COVID-19 Vaccine (1 - 2024-25 season) Never done    ANNUAL WELLNESS VISIT  05/21/2025    DIABETIC EYE EXAM  06/01/2025    HEMOGLOBIN A1C  07/13/2025    ZOSTER VACCINE (1 of 2) 11/24/2025 (Originally 9/6/2004)    INFLUENZA VACCINE  07/01/2025    LUNG CANCER SCREENING  12/26/2025    DIABETIC FOOT EXAM  01/13/2026    LIPID PANEL  01/13/2026    TDAP/TD VACCINES (2 - Td or Tdap) 06/15/2026    COLORECTAL CANCER SCREENING  12/31/2029    HEPATITIS C SCREENING  Completed    Pneumococcal Vaccine 50+  Completed    AAA SCREEN ONCE  Completed                                                                                                                                                CMS Preventative Services Quick Reference  Risk Factors Identified During Encounter  Immunizations Discussed/Encouraged: Influenza and COVID19    The above risks/problems have been discussed with the patient.  Pertinent information has been shared with the patient in the After Visit Summary.  An After Visit Summary and PPPS were made available to the patient.    Follow Up:   Next Medicare Wellness visit to be scheduled in 1 year.     Assessment & Plan  Medicare annual wellness visit, subsequent         Type 2 diabetes mellitus with hypoglycemia without coma, with long-term current use of insulin      Orders:    Basic Metabolic Panel    CBC & Differential    Hemoglobin A1c    Microalbumin / Creatinine Urine Ratio - Urine, Clean  Catch    Mixed hyperlipidemia       Orders:    CBC & Differential    Hepatic Function Panel (6) ([facility])    Lipid Panel    Type 2 diabetes mellitus with hyperglycemia, with long-term current use of insulin      Orders:    Insulin Aspart (novoLOG) 100 UNIT/ML injection; Check blood sugars before meals and before bed.  Use the following sliding scale: 0-100 no insulin.  101-150 inject 5 units of insulin.  1 5 1-200 inject 10 units of insulin 201-250 inject 15 units of insulin greater than 250 inject 20 units of insulin.  A maximum of 80 units/day.    dapagliflozin Propanediol (Farxiga) 10 MG tablet; Take 10 mg by mouth Daily.    CKD stage 3b, GFR 30-44 ml/min           Coronary arteriosclerosis           Peripheral vascular disease         Hypomagnesemia    Orders:    Magnesium         Follow Up:   No follow-ups on file.

## 2025-05-29 LAB
ALBUMIN SERPL-MCNC: 4.3 G/DL (ref 3.9–4.9)
ALBUMIN/CREAT UR: 245 MG/G CREAT (ref 0–29)
ALP SERPL-CCNC: 49 IU/L (ref 44–121)
ALT SERPL-CCNC: 28 IU/L (ref 0–44)
AST SERPL-CCNC: 28 IU/L (ref 0–40)
BASOPHILS # BLD AUTO: 0.1 X10E3/UL (ref 0–0.2)
BASOPHILS NFR BLD AUTO: 1 %
BILIRUB DIRECT SERPL-MCNC: 0.22 MG/DL (ref 0–0.4)
BILIRUB SERPL-MCNC: 0.7 MG/DL (ref 0–1.2)
BUN SERPL-MCNC: 20 MG/DL (ref 8–27)
BUN/CREAT SERPL: 13 (ref 10–24)
CALCIUM SERPL-MCNC: 9.6 MG/DL (ref 8.6–10.2)
CHLORIDE SERPL-SCNC: 105 MMOL/L (ref 96–106)
CHOLEST SERPL-MCNC: 82 MG/DL (ref 100–199)
CO2 SERPL-SCNC: 23 MMOL/L (ref 20–29)
CREAT SERPL-MCNC: 1.51 MG/DL (ref 0.76–1.27)
CREAT UR-MCNC: 142.9 MG/DL
EGFRCR SERPLBLD CKD-EPI 2021: 49 ML/MIN/1.73
EOSINOPHIL # BLD AUTO: 0.9 X10E3/UL (ref 0–0.4)
EOSINOPHIL NFR BLD AUTO: 9 %
ERYTHROCYTE [DISTWIDTH] IN BLOOD BY AUTOMATED COUNT: 13.6 % (ref 11.6–15.4)
GLUCOSE SERPL-MCNC: 157 MG/DL (ref 70–99)
HBA1C MFR BLD: 14.1 % (ref 4.8–5.6)
HCT VFR BLD AUTO: 41.2 % (ref 37.5–51)
HDLC SERPL-MCNC: 30 MG/DL
HGB BLD-MCNC: 13.5 G/DL (ref 13–17.7)
IMM GRANULOCYTES # BLD AUTO: 0 X10E3/UL (ref 0–0.1)
IMM GRANULOCYTES NFR BLD AUTO: 0 %
LDLC SERPL CALC-MCNC: 32 MG/DL (ref 0–99)
LYMPHOCYTES # BLD AUTO: 2.8 X10E3/UL (ref 0.7–3.1)
LYMPHOCYTES NFR BLD AUTO: 28 %
MAGNESIUM SERPL-MCNC: 1.7 MG/DL (ref 1.6–2.3)
MCH RBC QN AUTO: 30.3 PG (ref 26.6–33)
MCHC RBC AUTO-ENTMCNC: 32.8 G/DL (ref 31.5–35.7)
MCV RBC AUTO: 92 FL (ref 79–97)
MICROALBUMIN UR-MCNC: 349.5 UG/ML
MONOCYTES # BLD AUTO: 0.6 X10E3/UL (ref 0.1–0.9)
MONOCYTES NFR BLD AUTO: 6 %
NEUTROPHILS # BLD AUTO: 5.5 X10E3/UL (ref 1.4–7)
NEUTROPHILS NFR BLD AUTO: 56 %
PLATELET # BLD AUTO: 278 X10E3/UL (ref 150–450)
POTASSIUM SERPL-SCNC: 4.2 MMOL/L (ref 3.5–5.2)
RBC # BLD AUTO: 4.46 X10E6/UL (ref 4.14–5.8)
SODIUM SERPL-SCNC: 141 MMOL/L (ref 134–144)
TRIGL SERPL-MCNC: 108 MG/DL (ref 0–149)
VLDLC SERPL CALC-MCNC: 20 MG/DL (ref 5–40)
WBC # BLD AUTO: 9.9 X10E3/UL (ref 3.4–10.8)

## 2025-06-18 ENCOUNTER — OFFICE VISIT (OUTPATIENT)
Age: 70
End: 2025-06-18
Payer: MEDICARE

## 2025-06-18 VITALS
OXYGEN SATURATION: 97 % | BODY MASS INDEX: 27.64 KG/M2 | SYSTOLIC BLOOD PRESSURE: 130 MMHG | HEIGHT: 66 IN | DIASTOLIC BLOOD PRESSURE: 72 MMHG | HEART RATE: 68 BPM | TEMPERATURE: 98 F | WEIGHT: 172 LBS | RESPIRATION RATE: 14 BRPM

## 2025-06-18 DIAGNOSIS — I73.9 PERIPHERAL VASCULAR DISEASE: ICD-10-CM

## 2025-06-18 DIAGNOSIS — N18.32 CKD STAGE 3B, GFR 30-44 ML/MIN: ICD-10-CM

## 2025-06-18 DIAGNOSIS — E78.2 MIXED HYPERLIPIDEMIA: ICD-10-CM

## 2025-06-18 DIAGNOSIS — K76.0 HEPATIC STEATOSIS: ICD-10-CM

## 2025-06-18 DIAGNOSIS — E11.65 TYPE 2 DIABETES MELLITUS WITH HYPERGLYCEMIA, WITH LONG-TERM CURRENT USE OF INSULIN: Primary | ICD-10-CM

## 2025-06-18 DIAGNOSIS — Z79.4 TYPE 2 DIABETES MELLITUS WITH HYPERGLYCEMIA, WITH LONG-TERM CURRENT USE OF INSULIN: Primary | ICD-10-CM

## 2025-06-18 PROCEDURE — 3046F HEMOGLOBIN A1C LEVEL >9.0%: CPT | Performed by: FAMILY MEDICINE

## 2025-06-18 PROCEDURE — 1126F AMNT PAIN NOTED NONE PRSNT: CPT | Performed by: FAMILY MEDICINE

## 2025-06-18 PROCEDURE — 99214 OFFICE O/P EST MOD 30 MIN: CPT | Performed by: FAMILY MEDICINE

## 2025-06-18 NOTE — PROGRESS NOTES
"Chief Complaint  follow up on labs    Subjective        Fernando Gauthier presents to [facility]  History of Present Illness  Patient comes in in follow-up for his diabetes mellitus.  His hemoglobin A1c 5 months ago was 8.5 now is 14.1.  He is currently taking Amaryl 2 mg twice a day Lantus 45 mg daily and NovoLog prior to meals.  States he is waking up with his blood sugars low at 45.  He was prescribed Farxiga on his last visit but was unable to get it because secondary to cost.  He states that his dietary regimen is excellent that he takes and no sugar.  History of Present Illness  The patient presents for follow-up of his diabetes mellitus.    He reports that his insurance does not cover Farxiga, and the co-pay for Jardiance is $500, which he finds unaffordable. Despite contacting the pharmacy, he has been unable to obtain these medications. He maintains a diet with minimal sugar intake and has not made any recent dietary changes. However, he experiences significant fluctuations in his blood glucose levels, even with the consumption of a single boiled egg, which can cause his levels to spike from 90 to 300. He recalls an incident last week where he woke up at 3:00 AM with a blood glucose level of 49. His current medication regimen includes Amaryl 2 mg twice daily, Lantus 45 units daily, and NovoLog prior to meals.    His hemoglobin A1c was 8.5 five months ago and is now 14.1.       Objective   Vital Signs:  /72 (BP Location: Left arm, Patient Position: Sitting, Cuff Size: Adult)   Pulse 68   Temp 98 °F (36.7 °C) (Temporal)   Resp 14   Ht 167.6 cm (65.98\")   Wt 78 kg (172 lb)   SpO2 97%   BMI 27.77 kg/m²   Estimated body mass index is 27.77 kg/m² as calculated from the following:    Height as of this encounter: 167.6 cm (65.98\").    Weight as of this encounter: 78 kg (172 lb).            Physical Exam  Constitutional:       General: He is not in acute distress.     " Appearance: Normal appearance. He is not ill-appearing, toxic-appearing or diaphoretic.   HENT:      Head: Normocephalic and atraumatic.      Right Ear: There is no impacted cerumen.      Left Ear: There is no impacted cerumen.      Nose: No congestion or rhinorrhea.      Mouth/Throat:      Pharynx: Oropharynx is clear. No oropharyngeal exudate or posterior oropharyngeal erythema.   Eyes:      General: No scleral icterus.        Right eye: No discharge.         Left eye: No discharge.      Extraocular Movements: Extraocular movements intact.      Conjunctiva/sclera: Conjunctivae normal.      Pupils: Pupils are equal, round, and reactive to light.   Cardiovascular:      Rate and Rhythm: Normal rate and regular rhythm.      Pulses: Normal pulses.      Heart sounds: Normal heart sounds.   Pulmonary:      Effort: Pulmonary effort is normal.      Breath sounds: Normal breath sounds.   Abdominal:      General: Abdomen is flat. Bowel sounds are normal.      Palpations: Abdomen is soft.   Musculoskeletal:         General: Normal range of motion.      Cervical back: Normal range of motion and neck supple.   Skin:     General: Skin is warm.   Neurological:      General: No focal deficit present.      Mental Status: He is alert and oriented to person, place, and time. Mental status is at baseline.   Psychiatric:         Mood and Affect: Mood normal.         Behavior: Behavior normal.         Thought Content: Thought content normal.         Judgment: Judgment normal.                  Result Review :    Results  Labs   - Hemoglobin A1c: 14.1%              Assessment and Plan   Diagnoses and all orders for this visit:    1. Type 2 diabetes mellitus with hyperglycemia, with long-term current use of insulin (Primary)    2. Hepatic steatosis    3. CKD stage 3b, GFR 30-44 ml/min    4. Mixed hyperlipidemia    5. Peripheral vascular disease        Assessment & Plan  1. Diabetes mellitus.  - Hemoglobin A1c has increased from 8.5 to 14.1  over the past 5 months.  - Currently taking Amaryl 2 mg twice a day, Lantus 45 units daily, and NovoLog prior to meals. Reports waking up with blood sugars as low as 45.  - Dietary regimen reported as excellent with no sugar intake. Unable to obtain Farxiga due to cost.  - Prescribed Jardiance, starting with 10 mg once a day and increasing to 25 mg once a day after the initial dose runs out. Advised to decrease sugar intake. Referral to an endocrinologist will be made for further management.    Follow-up  The patient will follow up in 3 months.          Follow Up   No follow-ups on file.  Patient was given instructions and counseling regarding his condition or for health maintenance advice. Please see specific information pulled into the AVS if appropriate.         Patient or patient representative verbalized consent for the use of Ambient Listening during the visit with  Jeff Keating MD for chart documentation. 6/18/2025  08:34 EDT

## 2025-06-20 ENCOUNTER — TELEPHONE (OUTPATIENT)
Age: 70
End: 2025-06-20

## 2025-06-20 NOTE — TELEPHONE ENCOUNTER
Caller: [facility]    Relationship: Other    Best call back number: [phone] OPT [phone]. [phone]     What form or medical record are you requesting: PROGRESS NOTES FROM 6/18 APPOINTMENT    How would you like to receive the form or medical records (pick-up, mail, fax): FAX  If fax, what is the fax number:  [phone]       Timeframe paperwork needed: AS QUICKLY AS POSSIBLE    Additional notes: THIS IS FOR MEDICAL SUPPLIES

## 2025-06-23 RX ORDER — CLOPIDOGREL BISULFATE 75 MG/1
75 TABLET ORAL DAILY
Qty: 90 TABLET | Refills: 0 | Status: SHIPPED | OUTPATIENT
Start: 2025-06-23

## 2025-06-23 RX ORDER — EZETIMIBE 10 MG/1
10 TABLET ORAL DAILY
Qty: 90 TABLET | Refills: 0 | Status: SHIPPED | OUTPATIENT
Start: 2025-06-23

## 2025-06-23 RX ORDER — ISOSORBIDE MONONITRATE 30 MG/1
30 TABLET, EXTENDED RELEASE ORAL DAILY
Qty: 90 TABLET | Refills: 0 | Status: SHIPPED | OUTPATIENT
Start: 2025-06-23

## 2025-06-23 RX ORDER — ATORVASTATIN CALCIUM 80 MG/1
80 TABLET, FILM COATED ORAL DAILY
Qty: 90 TABLET | Refills: 0 | Status: SHIPPED | OUTPATIENT
Start: 2025-06-23

## 2025-06-23 RX ORDER — BISOPROLOL FUMARATE 5 MG/1
5 TABLET, FILM COATED ORAL DAILY
Qty: 90 TABLET | Refills: 0 | Status: SHIPPED | OUTPATIENT
Start: 2025-06-23

## 2025-07-01 NOTE — TELEPHONE ENCOUNTER
THEY HAVE FAXED THE NOTES BACK ON 6.23.25 FOR AN ADDENDUM THAT PATIENT IS USING SENSORS (ADDED TO NOTES), AND FAXED BACK PLEASE.    FAX NUMBER: [phone]

## 2025-07-03 RX ORDER — GLIMEPIRIDE 4 MG/1
4 TABLET ORAL DAILY
Qty: 180 TABLET | Refills: 3 | Status: SHIPPED | OUTPATIENT
Start: 2025-07-03

## 2025-07-15 NOTE — TELEPHONE ENCOUNTER
Caller: [facility] [employer] [facility]    Relationship to patient: Other    Best call back number: [phone] OPT 3    Patient is needing: CALLING TO CHECK THE STATUS OF THE ADDENDUM BEING SENT BACK. PLEASE ADVISE.

## 2025-07-18 DIAGNOSIS — R91.8 PULMONARY NODULES: Primary | ICD-10-CM

## 2025-07-18 NOTE — PROGRESS NOTES
Last CT 12/24- recommended 6 month  f/u      1. Several sub-5 mm noncalcified pulmonary nodules. There are no  previous chest CTs for comparison though a nodule in the posteromedial  right lower lobe, axial image 112 was not evident on previous CT  06/16/2021.  2. Lung-RADS 3. Probably benign.  3. Based on American College of Radiology, Lung-RADS v2022, management  based on above findings is follow-up with 6-month LDCT.

## 2025-07-22 ENCOUNTER — TELEPHONE (OUTPATIENT)
Age: 70
End: 2025-07-22

## 2025-07-22 ENCOUNTER — SPECIALTY PHARMACY (OUTPATIENT)
Dept: ENDOCRINOLOGY | Age: 70
End: 2025-07-22
Payer: COMMERCIAL

## 2025-07-22 NOTE — PROGRESS NOTES
Specialty Endocrine Disorders Pharmacy Patient Management Program  Under Review      Benefits investigation to get a quote for medication cost through your current prescription insurance was completed in preparation for your upcoming visit. You have been prescribed a medication that can be followed by the Patient Management Program and Specialty Pharmacy services offered by [facility].       Benefit Investigation:  Completed 7/22/2025 08:55 EDT    Current insurance: [employer]  BIN: [id number]  PCN: IS  ID: [id number]  Group: [id number]   Target Medication(s) and anticipated cost:   Jardiance 10mg daily:   $410.70 for 30 days - $265.92 of this is being applied towards deductible   $616.40 for 90 days - $370.92 of this is being applied towards deductible  Novolog: Not covered   Covered alternative: Humalog KwikPen up to 80units/day:   $35 for 15mL (18 days)  $70 for 30mL (37 days)   $70 for 45mL (56 days) - most cost effective  $105 for 60 mL (75 days)  Lantus 35units daily:   $70 for 15mL (42 days)   $105 for 30 mL (85 days) - most cost effective      If you have any questions or concerns about your medications or our specialty program, you are welcome to reach out to our in clinic pharmacy team at [phone]. We hope to speak with you soon!    Krupa Faust, PharmD, BCACP, BC-ADM, [facility]  Clinical Specialty Pharmacist, Endocrinology  7/22/2025  09:12 EDT

## 2025-07-23 ENCOUNTER — SPECIALTY PHARMACY (OUTPATIENT)
Dept: ENDOCRINOLOGY | Age: 70
End: 2025-07-23
Payer: COMMERCIAL

## 2025-07-23 ENCOUNTER — OFFICE VISIT (OUTPATIENT)
Dept: ENDOCRINOLOGY | Age: 70
End: 2025-07-23
Payer: MEDICARE

## 2025-07-23 ENCOUNTER — TELEPHONE (OUTPATIENT)
Dept: ENDOCRINOLOGY | Age: 70
End: 2025-07-23
Payer: COMMERCIAL

## 2025-07-23 VITALS
SYSTOLIC BLOOD PRESSURE: 130 MMHG | HEART RATE: 61 BPM | HEIGHT: 66 IN | OXYGEN SATURATION: 99 % | WEIGHT: 170 LBS | BODY MASS INDEX: 27.32 KG/M2 | TEMPERATURE: 97.8 F | DIASTOLIC BLOOD PRESSURE: 60 MMHG

## 2025-07-23 DIAGNOSIS — E11.65 TYPE 2 DIABETES MELLITUS WITH HYPERGLYCEMIA, WITH LONG-TERM CURRENT USE OF INSULIN: Primary | ICD-10-CM

## 2025-07-23 DIAGNOSIS — N18.31 HYPERTENSIVE KIDNEY DISEASE WITH STAGE 3A CHRONIC KIDNEY DISEASE: ICD-10-CM

## 2025-07-23 DIAGNOSIS — I12.9 HYPERTENSIVE KIDNEY DISEASE WITH STAGE 3A CHRONIC KIDNEY DISEASE: ICD-10-CM

## 2025-07-23 DIAGNOSIS — E78.2 MIXED HYPERLIPIDEMIA: ICD-10-CM

## 2025-07-23 DIAGNOSIS — Z79.4 TYPE 2 DIABETES MELLITUS WITH HYPERGLYCEMIA, WITH LONG-TERM CURRENT USE OF INSULIN: Primary | ICD-10-CM

## 2025-07-23 PROCEDURE — 99215 OFFICE O/P EST HI 40 MIN: CPT | Performed by: NURSE PRACTITIONER

## 2025-07-23 PROCEDURE — 3046F HEMOGLOBIN A1C LEVEL >9.0%: CPT | Performed by: NURSE PRACTITIONER

## 2025-07-23 RX ORDER — INSULIN LISPRO-AABC 100 [IU]/ML
4-12 INJECTION, SOLUTION SUBCUTANEOUS
Qty: 30 ML | Refills: 0 | Status: SHIPPED | OUTPATIENT
Start: 2025-07-23

## 2025-07-23 RX ORDER — PEN NEEDLE, DIABETIC 30 GX3/16"
1 NEEDLE, DISPOSABLE MISCELLANEOUS 4 TIMES DAILY
Qty: 400 EACH | Refills: 2 | Status: SHIPPED | OUTPATIENT
Start: 2025-07-23

## 2025-07-23 NOTE — PROGRESS NOTES
Chief Complaint  Chief Complaint   Patient presents with    Diabetes     Kayce ran out of sensors currently not using       Subjective        History of Present Illness    Fernando Gauthier 69 y.o. presents for an evaluation for Type 2 Diabetes    Referring provider: Jeff Keating MD    He was diagnosed with diabetes in [id number]    Family history of diabetes: mother, sister and nephew all with type 2 diabetes    Pt is on the following medications for their diabetes: Lantus 35 units daily, Jardiance 10 mg daily (started a month ago) and glimepiride 4 mg daily    Novolog before meals stopped by PCP/covered    Past medications include metformin stopped due to diarrhea  Actos stopped due to unknown reason        Personal history of pancreatitis: yes - around the time pt was diagnosed with diabetes; unknown cause      History of DKA: denies      Pt complains of polyuria and diarrhea (believe from Jardiance as it started at the same time he started taking it)    Denies polydipsia, polyuria, diarrhea, constipation, chest pain, shortness of breath, vision changes or numbness and tingling in feet/hands.    Pt denies a history of diabetic retinopathy.  Last eye exam was 1-1.5 years ago    Pt does have nephropathy.  Patient is/not currently taking ACE/ARB  History of renal cell cancer status post left partial nephrectomy   No longer follows with Nephrology - PCP took over monitoring    Pt denies neuropathy.    Pt does have CAD - s/p several stent placements  S/p STEMI in 05/2022  Has peripheral arterial disease     Last A1C in 05/25 was 14.1    Last microalbumin in 05/25 was positive            Blood Sugars    Blood glucoses are checked via Kayce - not been wearing (Dior) and not been poking finger              Hyperlipidemia     Pt is currently taking atorvastatin 80 mg HS and Zetia 10 mg daily    Last lipid panel in 05/25 showed Total 82, HDL 30, LDL 32 and Triglycerides 108            Hypertension with CKD Stage  "3a    Pt denies any chest pain, palpitations, shortness of breath or headache    Current regimen includes amlodipine 5 mg daily, bisoprolol 5 mg daily, chlorthalidone 25 mg daily, isosorbide mononitrate 30 mg daily and losartan 100 mg daily      Follows with Cardiology          Objective   Vital Signs:   /60   Pulse 61   Temp 97.8 °F (36.6 °C) (Oral)   Ht 167.6 cm (65.98\")   Wt 77.1 kg (170 lb)   SpO2 99%   BMI 27.46 kg/m²       Physical Exam   Physical Exam  Constitutional:       General: He is not in acute distress.     Appearance: Normal appearance. He is not diaphoretic.   HENT:      Head: Normocephalic and atraumatic.   Eyes:      General:         Right eye: No discharge.         Left eye: No discharge.   Skin:     General: Skin is warm and dry.   Neurological:      Mental Status: He is alert.   Psychiatric:         Mood and Affect: Mood normal.         Behavior: Behavior normal.                    Results Review:   Hemoglobin A1C   Date Value Ref Range Status   05/28/2025 14.1 (H) 4.8 - 5.6 % Final     Comment:              Prediabetes: 5.7 - 6.4           Diabetes: >6.4           Glycemic control for adults with diabetes: <7.0     05/21/2024 13.3  Final     Total Cholesterol   Date Value Ref Range Status   05/16/2022 171 0 - 200 mg/dL Final     Triglycerides   Date Value Ref Range Status   05/28/2025 108 0 - 149 mg/dL Final   05/21/2024 313 (A) 0 - 150 mg/dL Final     HDL Cholesterol   Date Value Ref Range Status   05/28/2025 30 (L) >39 mg/dL Final   05/21/2024 31 (A) 40 - 60 mg/dL Final     LDL Chol Calc (NIH)   Date Value Ref Range Status   05/28/2025 32 0 - 99 mg/dL Final     VLDL Cholesterol Rashad   Date Value Ref Range Status   05/28/2025 20 5 - 40 mg/dL Final     LDL/HDL Ratio   Date Value Ref Range Status   05/16/2022 3.87  Final         Assessment and Plan   Diagnoses and all orders for this visit:    1. Type 2 diabetes mellitus with hyperglycemia, with long-term current use of insulin " (Primary)  -     Lyumjev KwikPen 100 UNIT/ML solution pen-injector; Inject 4-12 Units under the skin into the appropriate area as directed 3 (Three) Times a Day Before Meals.  Dispense: 30 mL; Refill: 0  -     Insulin Pen Needle (Pen Needles) 32G X 4 MM misc; Inject 1 each under the skin into the appropriate area as directed 4 (Four) Times a Day.  Dispense: 400 each; Refill: 2  -     Basic Metabolic Panel; Future  -     Fructosamine; Future  -     C-Peptide; Future    Pt stated that he was diagnosed with diabetes in [id number]  Positive family history of type 2 diabetes  Pt stated that he had pancreatitis which was around the time he was diagnosed with diabetes; unknown cause of pancreatitis.  This plus most recent recent A1c is significantly elevated at 14.1% and that orals don't seem to work I am questioning whether pt has the ability to make insulin on his own  Type 2 diabetes vs Diabetes Mellitus Secondary to Pancreatitic Insuffiencey - will check labs.  [facility]  Metformin stopped due to diarrhea  Actos stopped due to unknown reason  Given that pt has had pancreatitis will avoid GLP1s/DPP4s and stop his sulfonylurea  Stop glimepiride  Continue with Lantus 35 units daily,   Hold Jardiance to see if diarrhea goes away  Start Lyumjev 4 units before each meal  Restart Kayce  If not wearing Kayce then need to be checking blood sugars at least 3 times a day - in the morning when you get up, before lunch and before dinner      2. Mixed hyperlipidemia    Positive history of cardiovascular events  On Statin and Zetia  Follows with Cardiology      3. Hypertensive kidney disease with stage 3a chronic kidney disease    Stable  Continue with current medication regimen  Defer management to PCP/Cardiology           Labs 2 weeks before visit - fasting before 10 am and hold insulin until after labs are completed (outside [facility])    RTC in 1 month with  me        Follow Up    Patient was given instructions and counseling regarding her condition or for health maintenance advice. Please see specific information pulled into the AVS if appropriate.       Total time spent with patient and reviewing chart: 55 minutes          ALAN Akers  07/23/25

## 2025-07-23 NOTE — PATIENT INSTRUCTIONS
Stop glimepiride  Continue with Lantus 35 units daily,   Hold Jardiance to see if diarrhea goes away  Start Lyumjev 4 units before each meal  Restart Kayce  If not wearing Kayce then need to be checking blood sugars at least 3 times a day - in the morning when you get up, before lunch and before dinner      Labs 2 weeks before visit - fasting before 10 am and hold insulin until after labs are completed

## 2025-07-23 NOTE — PROGRESS NOTES
Specialty Pharmacy Patient Management Program  Introduction to Services Outreach     Fernando Gauthier is seen by their provider for Type 2 diabetes. This was an initial outreach to introduce Patient Management Program and Specialty Pharmacy services offered by [facility], as the patient is taking specialty medication Lantus.     Fernando Gauthier DECLINED to fill specialty medication at [facility] and/or enrollment in the Patient Management Program at this time, but REMAINS ELIGIBLE for services in the future if desired and medication is continued.     Benefit Investigation  As of 7/23/2025 10:38 EDT  Current insurance: [employer]  BIN: [id number]  PCN: IS  ID: [id number]  Group: [id number]       Lyumjev- $105 for 84 day supply.  Advised patient to purchase pen needles over the counter at the pharmacy as it would be cheaper. OTC pen needles should be <$9 per box of 100, vs with insurance the cheapest was ~$15 per box.    Patient was not interested in transferring his prescription to the [facility] pharmacy at this time since he is currently getting all of his medications at [facility] pharmacy and denies any issues or complaints with getting his medications. Provided our contact information and will fill Lyumjev for the patient this month.   Answered all questions and concerns at this time.    Jing Morales, Dania  7/23/2025  10:38 EDT

## 2025-07-23 NOTE — TELEPHONE ENCOUNTER
An order has been sent to [facility] through [employer] for a Freestyle seble 3 plus.    Elena Avila  7/23/2025  10:31 EDT

## 2025-08-06 ENCOUNTER — HOSPITAL ENCOUNTER (OUTPATIENT)
Dept: CT IMAGING | Facility: HOSPITAL | Age: 70
Discharge: HOME | End: 2025-08-06
Admitting: FAMILY MEDICINE
Payer: MEDICARE

## 2025-08-06 PROCEDURE — 71250 CT THORAX DX C-: CPT

## 2025-08-26 LAB
AMBIG ABBREV BMP8 DEFAULT: NORMAL
BUN SERPL-MCNC: 23 MG/DL (ref 8–27)
BUN/CREAT SERPL: 15 (ref 10–24)
C PEPTIDE SERPL-MCNC: 1.9 NG/ML (ref 1.1–4.4)
CALCIUM SERPL-MCNC: 9.8 MG/DL (ref 8.6–10.2)
CHLORIDE SERPL-SCNC: 102 MMOL/L (ref 96–106)
CO2 SERPL-SCNC: 23 MMOL/L (ref 20–29)
CREAT SERPL-MCNC: 1.53 MG/DL (ref 0.76–1.27)
EGFRCR SERPLBLD CKD-EPI 2021: 49 ML/MIN/1.73
FRUCTOSAMINE SERPL-SCNC: 284 UMOL/L (ref 0–285)
GLUCOSE SERPL-MCNC: 91 MG/DL (ref 70–99)
POTASSIUM SERPL-SCNC: 3.7 MMOL/L (ref 3.5–5.2)
REPORT: NORMAL
SODIUM SERPL-SCNC: 140 MMOL/L (ref 134–144)

## (undated) DEVICE — BND PRESS RADL COMFRT 14IN STRL

## (undated) DEVICE — KT MANIFLD CARDIAC

## (undated) DEVICE — NC TREK CORONARY DILATATION CATHETER 3.0 MM X 20 MM / RAPID-EXCHANGE: Brand: NC TREK

## (undated) DEVICE — GW EMR FIX EXCHG J STD .035 3MM 260CM

## (undated) DEVICE — NC TREK CORONARY DILATATION CATHETER 4.0 MM X 20 MM / RAPID-EXCHANGE: Brand: NC TREK

## (undated) DEVICE — NC TREK CORONARY DILATATION CATHETER 2.75 MM X 20 MM / RAPID-EXCHANGE: Brand: NC TREK

## (undated) DEVICE — GW PRESSUREWIRE AERIS W/ AGILE TP 175CM

## (undated) DEVICE — KT CATH IMG DRAGONFLY DUO HC 2.7F 135CM

## (undated) DEVICE — DEV INDEFLATOR

## (undated) DEVICE — DEV INDEFLATOR P/N 580289

## (undated) DEVICE — TREK CORONARY DILATATION CATHETER 2.50 MM X 12 MM / RAPID-EXCHANGE: Brand: TREK

## (undated) DEVICE — 6F .070 XB 3 100CM: Brand: VISTA BRITE TIP

## (undated) DEVICE — 6F .070 XB 3.5 100CM: Brand: VISTA BRITE TIP

## (undated) DEVICE — NC TREK CORONARY DILATATION CATHETER 3.5 MM X 25 MM / RAPID-EXCHANGE: Brand: NC TREK

## (undated) DEVICE — CATH DIAG IMPULSE FL3.5 5F 100CM

## (undated) DEVICE — PK CATH CARD 40

## (undated) DEVICE — CATH DIAG IMPULSE FR4 5F 100CM

## (undated) DEVICE — GLIDESHEATH BASIC HYDROPHILIC COATED INTRODUCER SHEATH: Brand: GLIDESHEATH

## (undated) DEVICE — RUNTHROUGH NS EXTRA FLOPPY PTCA GUIDEWIRE: Brand: RUNTHROUGH

## (undated) DEVICE — CATH VENT MIV RADL PIG ST TIP 4F 110CM

## (undated) DEVICE — TR BAND RADIAL ARTERY COMPRESSION DEVICE: Brand: TR BAND

## (undated) DEVICE — GC 5F 056 XB 3.5 LBT: Brand: BRITE TIP